# Patient Record
Sex: MALE | Race: WHITE | Employment: OTHER | URBAN - METROPOLITAN AREA
[De-identification: names, ages, dates, MRNs, and addresses within clinical notes are randomized per-mention and may not be internally consistent; named-entity substitution may affect disease eponyms.]

---

## 2017-07-19 ENCOUNTER — HOSPITAL ENCOUNTER (OUTPATIENT)
Dept: OTHER | Age: 82
Discharge: HOME | End: 2017-07-19
Attending: INTERNAL MEDICINE | Admitting: INTERNAL MEDICINE

## 2017-07-19 ENCOUNTER — OFFICE VISIT (OUTPATIENT)
Dept: INTERNAL MEDICINE | Age: 82
End: 2017-07-19

## 2017-07-19 VITALS
BODY MASS INDEX: 23.25 KG/M2 | DIASTOLIC BLOOD PRESSURE: 54 MMHG | SYSTOLIC BLOOD PRESSURE: 122 MMHG | HEIGHT: 75 IN | HEART RATE: 56 BPM | WEIGHT: 187 LBS

## 2017-07-19 DIAGNOSIS — I10 ESSENTIAL HYPERTENSION: ICD-10-CM

## 2017-07-19 DIAGNOSIS — E03.9 HYPOTHYROIDISM, UNSPECIFIED TYPE: ICD-10-CM

## 2017-07-19 DIAGNOSIS — K31.9 DYSPEPSIA AND DISORDER OF FUNCTION OF STOMACH: ICD-10-CM

## 2017-07-19 DIAGNOSIS — R63.4 WEIGHT LOSS: ICD-10-CM

## 2017-07-19 DIAGNOSIS — E78.5 HYPERLIPIDEMIA, UNSPECIFIED HYPERLIPIDEMIA TYPE: ICD-10-CM

## 2017-07-19 DIAGNOSIS — D64.9 ANEMIA, UNSPECIFIED TYPE: ICD-10-CM

## 2017-07-19 DIAGNOSIS — I25.10 CORONARY ARTERY DISEASE INVOLVING NATIVE CORONARY ARTERY OF NATIVE HEART WITHOUT ANGINA PECTORIS: ICD-10-CM

## 2017-07-19 DIAGNOSIS — R10.13 DYSPEPSIA AND DISORDER OF FUNCTION OF STOMACH: ICD-10-CM

## 2017-07-19 DIAGNOSIS — R63.4 WEIGHT LOSS: Primary | ICD-10-CM

## 2017-07-19 LAB
A/G RATIO: 2 (ref 1.1–2.2)
ALBUMIN SERPL-MCNC: 4.3 G/DL (ref 3.4–5)
ALP BLD-CCNC: 157 U/L (ref 40–129)
ALT SERPL-CCNC: 165 U/L (ref 10–40)
ANION GAP SERPL CALCULATED.3IONS-SCNC: 12 MMOL/L (ref 3–16)
AST SERPL-CCNC: 97 U/L (ref 15–37)
BASOPHILS ABSOLUTE: 0 K/UL (ref 0–0.2)
BASOPHILS RELATIVE PERCENT: 0.5 %
BILIRUB SERPL-MCNC: 0.7 MG/DL (ref 0–1)
BUN BLDV-MCNC: 13 MG/DL (ref 7–20)
CALCIUM SERPL-MCNC: 9.2 MG/DL (ref 8.3–10.6)
CHLORIDE BLD-SCNC: 99 MMOL/L (ref 99–110)
CHOLESTEROL, TOTAL: 148 MG/DL (ref 0–199)
CO2: 28 MMOL/L (ref 21–32)
CREAT SERPL-MCNC: 1 MG/DL (ref 0.8–1.3)
EOSINOPHILS ABSOLUTE: 0.1 K/UL (ref 0–0.6)
EOSINOPHILS RELATIVE PERCENT: 1.7 %
GFR AFRICAN AMERICAN: >60
GFR NON-AFRICAN AMERICAN: >60
GLOBULIN: 2.2 G/DL
GLUCOSE BLD-MCNC: 97 MG/DL (ref 70–99)
HCT VFR BLD CALC: 39.9 % (ref 40.5–52.5)
HDLC SERPL-MCNC: 65 MG/DL (ref 40–60)
HEMOGLOBIN: 13.5 G/DL (ref 13.5–17.5)
LDL CHOLESTEROL CALCULATED: 71 MG/DL
LYMPHOCYTES ABSOLUTE: 2.7 K/UL (ref 1–5.1)
LYMPHOCYTES RELATIVE PERCENT: 45.6 %
MCH RBC QN AUTO: 32.8 PG (ref 26–34)
MCHC RBC AUTO-ENTMCNC: 33.8 G/DL (ref 31–36)
MCV RBC AUTO: 96.9 FL (ref 80–100)
MONOCYTES ABSOLUTE: 0.3 K/UL (ref 0–1.3)
MONOCYTES RELATIVE PERCENT: 4.9 %
NEUTROPHILS ABSOLUTE: 2.8 K/UL (ref 1.7–7.7)
NEUTROPHILS RELATIVE PERCENT: 47.3 %
PDW BLD-RTO: 14.2 % (ref 12.4–15.4)
PLATELET # BLD: 138 K/UL (ref 135–450)
PMV BLD AUTO: 10.2 FL (ref 5–10.5)
POTASSIUM SERPL-SCNC: 4.5 MMOL/L (ref 3.5–5.1)
RBC # BLD: 4.11 M/UL (ref 4.2–5.9)
SODIUM BLD-SCNC: 139 MMOL/L (ref 136–145)
T4 FREE: 1.3 NG/DL (ref 0.9–1.8)
TOTAL PROTEIN: 6.5 G/DL (ref 6.4–8.2)
TRIGL SERPL-MCNC: 59 MG/DL (ref 0–150)
TSH SERPL DL<=0.05 MIU/L-ACNC: 7.9 UIU/ML (ref 0.27–4.2)
VLDLC SERPL CALC-MCNC: 12 MG/DL
WBC # BLD: 5.9 K/UL (ref 4–11)

## 2017-07-19 PROCEDURE — 1123F ACP DISCUSS/DSCN MKR DOCD: CPT | Performed by: INTERNAL MEDICINE

## 2017-07-19 PROCEDURE — G8427 DOCREV CUR MEDS BY ELIG CLIN: HCPCS | Performed by: INTERNAL MEDICINE

## 2017-07-19 PROCEDURE — G8598 ASA/ANTIPLAT THER USED: HCPCS | Performed by: INTERNAL MEDICINE

## 2017-07-19 PROCEDURE — 4040F PNEUMOC VAC/ADMIN/RCVD: CPT | Performed by: INTERNAL MEDICINE

## 2017-07-19 PROCEDURE — 1036F TOBACCO NON-USER: CPT | Performed by: INTERNAL MEDICINE

## 2017-07-19 PROCEDURE — G8420 CALC BMI NORM PARAMETERS: HCPCS | Performed by: INTERNAL MEDICINE

## 2017-07-19 PROCEDURE — 99214 OFFICE O/P EST MOD 30 MIN: CPT | Performed by: INTERNAL MEDICINE

## 2017-07-19 RX ORDER — PANTOPRAZOLE SODIUM 40 MG/1
40 TABLET, DELAYED RELEASE ORAL 2 TIMES DAILY
Qty: 60 TABLET | Refills: 3 | Status: ON HOLD | OUTPATIENT
Start: 2017-07-19 | End: 2020-08-28 | Stop reason: CLARIF

## 2017-07-19 ASSESSMENT — ENCOUNTER SYMPTOMS
CONSTIPATION: 0
VOICE CHANGE: 0
ABDOMINAL PAIN: 0
BACK PAIN: 0
TROUBLE SWALLOWING: 0
SHORTNESS OF BREATH: 0
DIARRHEA: 0
NAUSEA: 0

## 2017-07-19 ASSESSMENT — PATIENT HEALTH QUESTIONNAIRE - PHQ9
SUM OF ALL RESPONSES TO PHQ9 QUESTIONS 1 & 2: 0
1. LITTLE INTEREST OR PLEASURE IN DOING THINGS: 0
2. FEELING DOWN, DEPRESSED OR HOPELESS: 0
SUM OF ALL RESPONSES TO PHQ QUESTIONS 1-9: 0

## 2017-07-25 ENCOUNTER — TELEPHONE (OUTPATIENT)
Dept: INTERNAL MEDICINE | Age: 82
End: 2017-07-25

## 2017-07-27 ENCOUNTER — TELEPHONE (OUTPATIENT)
Dept: INTERNAL MEDICINE | Age: 82
End: 2017-07-27

## 2017-07-27 DIAGNOSIS — R63.4 WEIGHT LOSS: Primary | ICD-10-CM

## 2017-07-27 DIAGNOSIS — K22.89 ESOPHAGEAL DILATATION: ICD-10-CM

## 2017-08-04 ENCOUNTER — HOSPITAL ENCOUNTER (OUTPATIENT)
Dept: CT IMAGING | Age: 82
Discharge: HOME | End: 2017-08-04
Attending: INTERNAL MEDICINE | Admitting: INTERNAL MEDICINE

## 2017-08-04 DIAGNOSIS — R63.4 WEIGHT LOSS: ICD-10-CM

## 2017-08-04 DIAGNOSIS — K22.89 ESOPHAGEAL DILATATION: ICD-10-CM

## 2017-08-04 DIAGNOSIS — R63.4 ABNORMAL WEIGHT LOSS: ICD-10-CM

## 2017-08-29 ENCOUNTER — HOSPITAL ENCOUNTER (OUTPATIENT)
Dept: ENDOSCOPY | Age: 82
Discharge: HOME | End: 2017-08-29
Attending: INTERNAL MEDICINE | Admitting: INTERNAL MEDICINE

## 2017-08-29 VITALS
HEIGHT: 72 IN | OXYGEN SATURATION: 100 % | RESPIRATION RATE: 18 BRPM | WEIGHT: 177 LBS | BODY MASS INDEX: 23.98 KG/M2 | TEMPERATURE: 99 F | HEART RATE: 48 BPM | DIASTOLIC BLOOD PRESSURE: 53 MMHG | SYSTOLIC BLOOD PRESSURE: 150 MMHG

## 2018-05-30 ENCOUNTER — OFFICE VISIT (OUTPATIENT)
Dept: INTERNAL MEDICINE | Age: 83
End: 2018-05-30

## 2018-05-30 VITALS
DIASTOLIC BLOOD PRESSURE: 62 MMHG | HEIGHT: 75 IN | HEART RATE: 89 BPM | OXYGEN SATURATION: 98 % | RESPIRATION RATE: 16 BRPM | TEMPERATURE: 98.8 F | SYSTOLIC BLOOD PRESSURE: 120 MMHG | WEIGHT: 198.4 LBS | BODY MASS INDEX: 24.67 KG/M2

## 2018-05-30 DIAGNOSIS — R09.89 RUNNY NOSE: ICD-10-CM

## 2018-05-30 DIAGNOSIS — I10 ESSENTIAL HYPERTENSION: ICD-10-CM

## 2018-05-30 DIAGNOSIS — R21 RASH: Primary | ICD-10-CM

## 2018-05-30 PROCEDURE — 4040F PNEUMOC VAC/ADMIN/RCVD: CPT | Performed by: INTERNAL MEDICINE

## 2018-05-30 PROCEDURE — G8420 CALC BMI NORM PARAMETERS: HCPCS | Performed by: INTERNAL MEDICINE

## 2018-05-30 PROCEDURE — G8598 ASA/ANTIPLAT THER USED: HCPCS | Performed by: INTERNAL MEDICINE

## 2018-05-30 PROCEDURE — 99213 OFFICE O/P EST LOW 20 MIN: CPT | Performed by: INTERNAL MEDICINE

## 2018-05-30 PROCEDURE — 1123F ACP DISCUSS/DSCN MKR DOCD: CPT | Performed by: INTERNAL MEDICINE

## 2018-05-30 PROCEDURE — G8427 DOCREV CUR MEDS BY ELIG CLIN: HCPCS | Performed by: INTERNAL MEDICINE

## 2018-05-30 PROCEDURE — 1036F TOBACCO NON-USER: CPT | Performed by: INTERNAL MEDICINE

## 2018-05-30 RX ORDER — TORSEMIDE 20 MG/1
10 TABLET ORAL DAILY
Qty: 30 TABLET | Refills: 3 | Status: ON HOLD
Start: 2018-05-30 | End: 2020-08-28 | Stop reason: CLARIF

## 2018-05-30 RX ORDER — METHYLPREDNISOLONE 4 MG/1
TABLET ORAL
Qty: 1 KIT | Refills: 0 | Status: SHIPPED | OUTPATIENT
Start: 2018-05-30 | End: 2018-06-05

## 2018-05-30 RX ORDER — AZELASTINE 1 MG/ML
1 SPRAY, METERED NASAL 2 TIMES DAILY
Qty: 1 BOTTLE | Refills: 3 | Status: SHIPPED
Start: 2018-05-30 | End: 2020-08-20 | Stop reason: CLARIF

## 2018-05-30 ASSESSMENT — ENCOUNTER SYMPTOMS
EYE REDNESS: 0
ABDOMINAL PAIN: 0
SHORTNESS OF BREATH: 0
CHEST TIGHTNESS: 0
BACK PAIN: 0
NAUSEA: 0

## 2018-07-10 ENCOUNTER — OFFICE VISIT (OUTPATIENT)
Dept: INTERNAL MEDICINE | Age: 83
End: 2018-07-10

## 2018-07-10 VITALS
OXYGEN SATURATION: 96 % | WEIGHT: 199 LBS | DIASTOLIC BLOOD PRESSURE: 58 MMHG | SYSTOLIC BLOOD PRESSURE: 132 MMHG | HEART RATE: 85 BPM | HEIGHT: 75 IN | BODY MASS INDEX: 24.74 KG/M2

## 2018-07-10 DIAGNOSIS — R21 RASH AND NONSPECIFIC SKIN ERUPTION: ICD-10-CM

## 2018-07-10 DIAGNOSIS — R21 RASH: Primary | ICD-10-CM

## 2018-07-10 DIAGNOSIS — I77.9 BILATERAL CAROTID ARTERY DISEASE (HCC): ICD-10-CM

## 2018-07-10 PROBLEM — R63.4 WEIGHT LOSS: Status: RESOLVED | Noted: 2017-07-19 | Resolved: 2018-07-10

## 2018-07-10 PROCEDURE — 99213 OFFICE O/P EST LOW 20 MIN: CPT | Performed by: INTERNAL MEDICINE

## 2018-07-10 PROCEDURE — G8427 DOCREV CUR MEDS BY ELIG CLIN: HCPCS | Performed by: INTERNAL MEDICINE

## 2018-07-10 PROCEDURE — 1036F TOBACCO NON-USER: CPT | Performed by: INTERNAL MEDICINE

## 2018-07-10 PROCEDURE — 96372 THER/PROPH/DIAG INJ SC/IM: CPT | Performed by: INTERNAL MEDICINE

## 2018-07-10 PROCEDURE — 1123F ACP DISCUSS/DSCN MKR DOCD: CPT | Performed by: INTERNAL MEDICINE

## 2018-07-10 PROCEDURE — G8598 ASA/ANTIPLAT THER USED: HCPCS | Performed by: INTERNAL MEDICINE

## 2018-07-10 PROCEDURE — 4040F PNEUMOC VAC/ADMIN/RCVD: CPT | Performed by: INTERNAL MEDICINE

## 2018-07-10 PROCEDURE — G8420 CALC BMI NORM PARAMETERS: HCPCS | Performed by: INTERNAL MEDICINE

## 2018-07-10 RX ORDER — METHYLPREDNISOLONE ACETATE 40 MG/ML
40 INJECTION, SUSPENSION INTRA-ARTICULAR; INTRALESIONAL; INTRAMUSCULAR; SOFT TISSUE ONCE
Status: COMPLETED | OUTPATIENT
Start: 2018-07-10 | End: 2018-07-10

## 2018-07-10 RX ORDER — IVERMECTIN 3 MG/1
TABLET ORAL
Qty: 8 TABLET | Refills: 0 | Status: SHIPPED
Start: 2018-07-10 | End: 2020-08-20 | Stop reason: CLARIF

## 2018-07-10 RX ORDER — IVERMECTIN 3 MG/1
TABLET ORAL
Qty: 8 TABLET | Refills: 0 | Status: SHIPPED | OUTPATIENT
Start: 2018-07-10 | End: 2018-07-10 | Stop reason: SDUPTHER

## 2018-07-10 RX ORDER — METHYLPREDNISOLONE ACETATE 80 MG/ML
80 INJECTION, SUSPENSION INTRA-ARTICULAR; INTRALESIONAL; INTRAMUSCULAR; SOFT TISSUE ONCE
Status: DISCONTINUED | OUTPATIENT
Start: 2018-07-10 | End: 2020-08-20 | Stop reason: CLARIF

## 2018-07-10 RX ADMIN — METHYLPREDNISOLONE ACETATE 40 MG: 40 INJECTION, SUSPENSION INTRA-ARTICULAR; INTRALESIONAL; INTRAMUSCULAR; SOFT TISSUE at 10:57

## 2018-07-10 ASSESSMENT — ENCOUNTER SYMPTOMS
TROUBLE SWALLOWING: 0
GASTROINTESTINAL NEGATIVE: 1
EYE DISCHARGE: 0
RESPIRATORY NEGATIVE: 1

## 2018-07-10 NOTE — PROGRESS NOTES
skin eruption        Rash and nonspecific skin eruption  Patient's rash initially disappeared after patient was changed from Lasix to Demadex. He was also treated concomitantly with steroids. Approximately 3 weeks later his rash has returned. The itching gets more intense at night. We'll empirically treat with ivermectin. Also give Depo-Medrol 40 mg today. If no better then consider dermatological evaluation. PLAN:  See ASSESSMENT for evaluation & PLAN     No orders of the defined types were placed in this encounter. PSH, PMH, SH and FH reviewed and noted. Recent and past labs, tests and consults also reviewed. Recent or new meds also reviewed.

## 2018-07-10 NOTE — ASSESSMENT & PLAN NOTE
Patient's rash initially disappeared after patient was changed from Lasix to Demadex. He was also treated concomitantly with steroids. Approximately 3 weeks later his rash has returned. The itching gets more intense at night. We'll empirically treat with ivermectin. Also give Depo-Medrol 40 mg today. If no better then consider dermatological evaluation.

## 2018-10-16 ENCOUNTER — NURSE ONLY (OUTPATIENT)
Dept: INTERNAL MEDICINE CLINIC | Age: 83
End: 2018-10-16
Payer: MEDICARE

## 2018-10-16 DIAGNOSIS — Z23 NEED FOR INFLUENZA VACCINATION: Primary | ICD-10-CM

## 2018-10-16 PROCEDURE — G0008 ADMIN INFLUENZA VIRUS VAC: HCPCS | Performed by: INTERNAL MEDICINE

## 2018-10-16 PROCEDURE — 90662 IIV NO PRSV INCREASED AG IM: CPT | Performed by: INTERNAL MEDICINE

## 2018-10-16 NOTE — PROGRESS NOTES
Vaccine Information Sheet, \"Influenza - Inactivated\"  given to Kaykay Chowdhury, or parent/legal guardian of  Kaykay Chowdhury and verbalized understanding. Patient responses:    Have you ever had a reaction to a flu vaccine? No  Are you able to eat eggs without adverse effects? Yes  Do you have any current illness? No  Have you ever had Guillian Creston Syndrome? No    Flu vaccine given per order. Please see immunization tab.

## 2019-06-11 ENCOUNTER — TELEPHONE (OUTPATIENT)
Dept: INTERNAL MEDICINE CLINIC | Age: 84
End: 2019-06-11

## 2019-10-22 ENCOUNTER — NURSE ONLY (OUTPATIENT)
Dept: INTERNAL MEDICINE CLINIC | Age: 84
End: 2019-10-22
Payer: MEDICARE

## 2019-10-22 DIAGNOSIS — Z23 NEED FOR INFLUENZA VACCINATION: Primary | ICD-10-CM

## 2019-10-22 PROCEDURE — 90653 IIV ADJUVANT VACCINE IM: CPT | Performed by: INTERNAL MEDICINE

## 2019-10-22 PROCEDURE — G0008 ADMIN INFLUENZA VIRUS VAC: HCPCS | Performed by: INTERNAL MEDICINE

## 2019-10-22 ASSESSMENT — PATIENT HEALTH QUESTIONNAIRE - PHQ9
1. LITTLE INTEREST OR PLEASURE IN DOING THINGS: 0
SUM OF ALL RESPONSES TO PHQ QUESTIONS 1-9: 0
SUM OF ALL RESPONSES TO PHQ9 QUESTIONS 1 & 2: 0
2. FEELING DOWN, DEPRESSED OR HOPELESS: 0
SUM OF ALL RESPONSES TO PHQ QUESTIONS 1-9: 0

## 2020-08-20 ENCOUNTER — HOSPITAL ENCOUNTER (INPATIENT)
Age: 85
LOS: 4 days | Discharge: NURSING, REHAB OR LONG-TERM CARE | DRG: 481 | End: 2020-08-24
Attending: EMERGENCY MEDICINE | Admitting: HOSPITALIST
Payer: MEDICARE

## 2020-08-20 ENCOUNTER — APPOINTMENT (OUTPATIENT)
Dept: CT IMAGING | Age: 85
DRG: 481 | End: 2020-08-20
Payer: MEDICARE

## 2020-08-20 ENCOUNTER — APPOINTMENT (OUTPATIENT)
Dept: GENERAL RADIOLOGY | Age: 85
DRG: 481 | End: 2020-08-20
Payer: MEDICARE

## 2020-08-20 PROBLEM — S72.002A CLOSED LEFT HIP FRACTURE, INITIAL ENCOUNTER (HCC): Status: ACTIVE | Noted: 2020-08-20

## 2020-08-20 LAB
ANION GAP SERPL CALCULATED.3IONS-SCNC: 9 MMOL/L (ref 3–16)
ATYPICAL LYMPHOCYTE RELATIVE PERCENT: 2 % (ref 0–6)
BACTERIA: ABNORMAL /HPF
BASOPHILS ABSOLUTE: 0 K/UL (ref 0–0.2)
BASOPHILS RELATIVE PERCENT: 0 %
BILIRUBIN URINE: NEGATIVE
BLOOD, URINE: ABNORMAL
BUN BLDV-MCNC: 13 MG/DL (ref 7–20)
CALCIUM SERPL-MCNC: 8.9 MG/DL (ref 8.3–10.6)
CHLORIDE BLD-SCNC: 101 MMOL/L (ref 99–110)
CLARITY: CLEAR
CO2: 24 MMOL/L (ref 21–32)
COLOR: YELLOW
CREAT SERPL-MCNC: 1.1 MG/DL (ref 0.8–1.3)
EKG ATRIAL RATE: 60 BPM
EKG DIAGNOSIS: NORMAL
EKG P AXIS: 34 DEGREES
EKG P-R INTERVAL: 234 MS
EKG Q-T INTERVAL: 438 MS
EKG QRS DURATION: 128 MS
EKG QTC CALCULATION (BAZETT): 438 MS
EKG R AXIS: -51 DEGREES
EKG T AXIS: 86 DEGREES
EKG VENTRICULAR RATE: 60 BPM
EOSINOPHILS ABSOLUTE: 0.1 K/UL (ref 0–0.6)
EOSINOPHILS RELATIVE PERCENT: 1 %
EPITHELIAL CELLS, UA: ABNORMAL /HPF (ref 0–5)
GFR AFRICAN AMERICAN: >60
GFR NON-AFRICAN AMERICAN: >60
GLUCOSE BLD-MCNC: 111 MG/DL (ref 70–99)
GLUCOSE URINE: NEGATIVE MG/DL
HCT VFR BLD CALC: 39 % (ref 40.5–52.5)
HEMOGLOBIN: 12.8 G/DL (ref 13.5–17.5)
KETONES, URINE: NEGATIVE MG/DL
LEUKOCYTE ESTERASE, URINE: ABNORMAL
LYMPHOCYTES ABSOLUTE: 4.1 K/UL (ref 1–5.1)
LYMPHOCYTES RELATIVE PERCENT: 31 %
MCH RBC QN AUTO: 32.3 PG (ref 26–34)
MCHC RBC AUTO-ENTMCNC: 33 G/DL (ref 31–36)
MCV RBC AUTO: 98 FL (ref 80–100)
MICROSCOPIC EXAMINATION: YES
MONOCYTES ABSOLUTE: 0.2 K/UL (ref 0–1.3)
MONOCYTES RELATIVE PERCENT: 2 %
NEUTROPHILS ABSOLUTE: 7.9 K/UL (ref 1.7–7.7)
NEUTROPHILS RELATIVE PERCENT: 64 %
NITRITE, URINE: NEGATIVE
PDW BLD-RTO: 14 % (ref 12.4–15.4)
PH UA: 7.5 (ref 5–8)
PLATELET # BLD: 116 K/UL (ref 135–450)
PLATELET SLIDE REVIEW: ABNORMAL
PMV BLD AUTO: 9.3 FL (ref 5–10.5)
POTASSIUM REFLEX MAGNESIUM: 4.8 MMOL/L (ref 3.5–5.1)
PRO-BNP: 1925 PG/ML (ref 0–449)
PROTEIN UA: ABNORMAL MG/DL
RBC # BLD: 3.98 M/UL (ref 4.2–5.9)
RBC UA: ABNORMAL /HPF (ref 0–4)
SODIUM BLD-SCNC: 134 MMOL/L (ref 136–145)
SPECIFIC GRAVITY UA: 1.01 (ref 1–1.03)
TROPONIN: 0.02 NG/ML
URINE REFLEX TO CULTURE: YES
URINE TYPE: ABNORMAL
UROBILINOGEN, URINE: 0.2 E.U./DL
WBC # BLD: 12.4 K/UL (ref 4–11)
WBC UA: ABNORMAL /HPF (ref 0–5)

## 2020-08-20 PROCEDURE — 1200000000 HC SEMI PRIVATE

## 2020-08-20 PROCEDURE — 85025 COMPLETE CBC W/AUTO DIFF WBC: CPT

## 2020-08-20 PROCEDURE — 84484 ASSAY OF TROPONIN QUANT: CPT

## 2020-08-20 PROCEDURE — 80048 BASIC METABOLIC PNL TOTAL CA: CPT

## 2020-08-20 PROCEDURE — 72125 CT NECK SPINE W/O DYE: CPT

## 2020-08-20 PROCEDURE — 93005 ELECTROCARDIOGRAM TRACING: CPT | Performed by: STUDENT IN AN ORGANIZED HEALTH CARE EDUCATION/TRAINING PROGRAM

## 2020-08-20 PROCEDURE — 6370000000 HC RX 637 (ALT 250 FOR IP): Performed by: STUDENT IN AN ORGANIZED HEALTH CARE EDUCATION/TRAINING PROGRAM

## 2020-08-20 PROCEDURE — 70450 CT HEAD/BRAIN W/O DYE: CPT

## 2020-08-20 PROCEDURE — 72170 X-RAY EXAM OF PELVIS: CPT

## 2020-08-20 PROCEDURE — 2580000003 HC RX 258: Performed by: STUDENT IN AN ORGANIZED HEALTH CARE EDUCATION/TRAINING PROGRAM

## 2020-08-20 PROCEDURE — 6360000002 HC RX W HCPCS: Performed by: STUDENT IN AN ORGANIZED HEALTH CARE EDUCATION/TRAINING PROGRAM

## 2020-08-20 PROCEDURE — 99285 EMERGENCY DEPT VISIT HI MDM: CPT

## 2020-08-20 PROCEDURE — 71045 X-RAY EXAM CHEST 1 VIEW: CPT

## 2020-08-20 PROCEDURE — 73552 X-RAY EXAM OF FEMUR 2/>: CPT

## 2020-08-20 PROCEDURE — 83880 ASSAY OF NATRIURETIC PEPTIDE: CPT

## 2020-08-20 PROCEDURE — 6360000002 HC RX W HCPCS: Performed by: HOSPITALIST

## 2020-08-20 PROCEDURE — 73700 CT LOWER EXTREMITY W/O DYE: CPT

## 2020-08-20 PROCEDURE — 96365 THER/PROPH/DIAG IV INF INIT: CPT

## 2020-08-20 PROCEDURE — 87086 URINE CULTURE/COLONY COUNT: CPT

## 2020-08-20 PROCEDURE — 81001 URINALYSIS AUTO W/SCOPE: CPT

## 2020-08-20 RX ORDER — PROMETHAZINE HYDROCHLORIDE 12.5 MG/1
12.5 TABLET ORAL EVERY 6 HOURS PRN
Status: DISCONTINUED | OUTPATIENT
Start: 2020-08-20 | End: 2020-08-24 | Stop reason: HOSPADM

## 2020-08-20 RX ORDER — ACETAMINOPHEN 650 MG/1
650 SUPPOSITORY RECTAL EVERY 6 HOURS PRN
Status: DISCONTINUED | OUTPATIENT
Start: 2020-08-20 | End: 2020-08-22

## 2020-08-20 RX ORDER — TAMSULOSIN HYDROCHLORIDE 0.4 MG/1
0.4 CAPSULE ORAL NIGHTLY
Status: ON HOLD | COMMUNITY
End: 2020-08-24 | Stop reason: SDUPTHER

## 2020-08-20 RX ORDER — TAMSULOSIN HYDROCHLORIDE 0.4 MG/1
0.4 CAPSULE ORAL NIGHTLY
Status: DISCONTINUED | OUTPATIENT
Start: 2020-08-20 | End: 2020-08-24 | Stop reason: HOSPADM

## 2020-08-20 RX ORDER — ONDANSETRON 2 MG/ML
4 INJECTION INTRAMUSCULAR; INTRAVENOUS EVERY 6 HOURS PRN
Status: DISCONTINUED | OUTPATIENT
Start: 2020-08-20 | End: 2020-08-24 | Stop reason: HOSPADM

## 2020-08-20 RX ORDER — ACETAMINOPHEN 325 MG/1
650 TABLET ORAL EVERY 6 HOURS PRN
Status: DISCONTINUED | OUTPATIENT
Start: 2020-08-20 | End: 2020-08-22

## 2020-08-20 RX ORDER — POLYETHYLENE GLYCOL 3350 17 G/17G
17 POWDER, FOR SOLUTION ORAL DAILY PRN
Status: DISCONTINUED | OUTPATIENT
Start: 2020-08-20 | End: 2020-08-24 | Stop reason: HOSPADM

## 2020-08-20 RX ORDER — FUROSEMIDE 10 MG/ML
20 INJECTION INTRAMUSCULAR; INTRAVENOUS 2 TIMES DAILY
Status: DISCONTINUED | OUTPATIENT
Start: 2020-08-20 | End: 2020-08-24 | Stop reason: HOSPADM

## 2020-08-20 RX ORDER — SODIUM CHLORIDE 0.9 % (FLUSH) 0.9 %
10 SYRINGE (ML) INJECTION PRN
Status: DISCONTINUED | OUTPATIENT
Start: 2020-08-20 | End: 2020-08-24 | Stop reason: HOSPADM

## 2020-08-20 RX ORDER — AZITHROMYCIN 250 MG/1
500 TABLET, FILM COATED ORAL ONCE
Status: COMPLETED | OUTPATIENT
Start: 2020-08-20 | End: 2020-08-20

## 2020-08-20 RX ORDER — LISINOPRIL 20 MG/1
20 TABLET ORAL DAILY
Status: DISCONTINUED | OUTPATIENT
Start: 2020-08-21 | End: 2020-08-24 | Stop reason: HOSPADM

## 2020-08-20 RX ORDER — ASPIRIN 81 MG/1
81 TABLET ORAL DAILY
Status: DISCONTINUED | OUTPATIENT
Start: 2020-08-21 | End: 2020-08-24 | Stop reason: HOSPADM

## 2020-08-20 RX ORDER — ATORVASTATIN CALCIUM 80 MG/1
80 TABLET, FILM COATED ORAL DAILY
Status: DISCONTINUED | OUTPATIENT
Start: 2020-08-21 | End: 2020-08-24 | Stop reason: HOSPADM

## 2020-08-20 RX ORDER — SODIUM CHLORIDE 0.9 % (FLUSH) 0.9 %
10 SYRINGE (ML) INJECTION EVERY 12 HOURS SCHEDULED
Status: DISCONTINUED | OUTPATIENT
Start: 2020-08-20 | End: 2020-08-24 | Stop reason: HOSPADM

## 2020-08-20 RX ORDER — MORPHINE SULFATE 2 MG/ML
2 INJECTION, SOLUTION INTRAMUSCULAR; INTRAVENOUS EVERY 4 HOURS PRN
Status: DISPENSED | OUTPATIENT
Start: 2020-08-20 | End: 2020-08-22

## 2020-08-20 RX ORDER — LEVOTHYROXINE SODIUM 0.05 MG/1
50 TABLET ORAL DAILY
COMMUNITY
End: 2020-10-16 | Stop reason: SDUPTHER

## 2020-08-20 RX ORDER — LEVOTHYROXINE SODIUM 0.05 MG/1
50 TABLET ORAL
Status: DISCONTINUED | OUTPATIENT
Start: 2020-08-21 | End: 2020-08-24 | Stop reason: HOSPADM

## 2020-08-20 RX ADMIN — AZITHROMYCIN MONOHYDRATE 500 MG: 250 TABLET ORAL at 15:50

## 2020-08-20 RX ADMIN — Medication 10 ML: at 21:51

## 2020-08-20 RX ADMIN — CEFTRIAXONE 1 G: 1 INJECTION, POWDER, FOR SOLUTION INTRAMUSCULAR; INTRAVENOUS at 15:49

## 2020-08-20 RX ADMIN — FUROSEMIDE 20 MG: 10 INJECTION, SOLUTION INTRAMUSCULAR; INTRAVENOUS at 21:49

## 2020-08-20 RX ADMIN — TAMSULOSIN HYDROCHLORIDE 0.4 MG: 0.4 CAPSULE ORAL at 23:40

## 2020-08-20 RX ADMIN — MORPHINE SULFATE 2 MG: 2 INJECTION, SOLUTION INTRAMUSCULAR; INTRAVENOUS at 21:49

## 2020-08-20 ASSESSMENT — PAIN DESCRIPTION - LOCATION
LOCATION: HIP
LOCATION: BACK

## 2020-08-20 ASSESSMENT — PAIN DESCRIPTION - FREQUENCY
FREQUENCY: CONTINUOUS
FREQUENCY: CONTINUOUS

## 2020-08-20 ASSESSMENT — PAIN DESCRIPTION - PAIN TYPE: TYPE: ACUTE PAIN

## 2020-08-20 ASSESSMENT — ENCOUNTER SYMPTOMS
RHINORRHEA: 0
TROUBLE SWALLOWING: 0
ABDOMINAL DISTENTION: 0
CHEST TIGHTNESS: 0
BACK PAIN: 0
ABDOMINAL PAIN: 0
COUGH: 0
EYES NEGATIVE: 1
NAUSEA: 0
BACK PAIN: 1
VOMITING: 0
SHORTNESS OF BREATH: 0
GASTROINTESTINAL NEGATIVE: 1
RESPIRATORY NEGATIVE: 1
DIARRHEA: 0
WHEEZING: 0

## 2020-08-20 ASSESSMENT — PAIN DESCRIPTION - DESCRIPTORS
DESCRIPTORS: PRESSURE
DESCRIPTORS: ACHING

## 2020-08-20 ASSESSMENT — PAIN SCALES - GENERAL
PAINLEVEL_OUTOF10: 7
PAINLEVEL_OUTOF10: 7
PAINLEVEL_OUTOF10: 8
PAINLEVEL_OUTOF10: 4

## 2020-08-20 ASSESSMENT — PAIN DESCRIPTION - ONSET
ONSET: ON-GOING
ONSET: ON-GOING

## 2020-08-20 ASSESSMENT — PAIN DESCRIPTION - ORIENTATION
ORIENTATION: MID;LOWER
ORIENTATION: LEFT

## 2020-08-20 ASSESSMENT — PAIN - FUNCTIONAL ASSESSMENT: PAIN_FUNCTIONAL_ASSESSMENT: PREVENTS OR INTERFERES SOME ACTIVE ACTIVITIES AND ADLS

## 2020-08-20 ASSESSMENT — PAIN DESCRIPTION - PROGRESSION
CLINICAL_PROGRESSION: NOT CHANGED
CLINICAL_PROGRESSION: NOT CHANGED
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
## 2020-08-20 NOTE — ED NOTES
Bed: B24-24  Expected date:   Expected time:   Means of arrival:   Comments:  Hold for medic     Salvador Lynch  08/20/20 [id number]

## 2020-08-21 ENCOUNTER — TELEPHONE (OUTPATIENT)
Dept: INTERNAL MEDICINE CLINIC | Age: 85
End: 2020-08-21

## 2020-08-21 ENCOUNTER — ANESTHESIA EVENT (OUTPATIENT)
Dept: OPERATING ROOM | Age: 85
DRG: 481 | End: 2020-08-21
Payer: MEDICARE

## 2020-08-21 LAB
ALBUMIN SERPL-MCNC: 3.7 G/DL (ref 3.4–5)
ANION GAP SERPL CALCULATED.3IONS-SCNC: 11 MMOL/L (ref 3–16)
BASOPHILS ABSOLUTE: 0 K/UL (ref 0–0.2)
BASOPHILS RELATIVE PERCENT: 0.4 %
BUN BLDV-MCNC: 16 MG/DL (ref 7–20)
CALCIUM SERPL-MCNC: 8.6 MG/DL (ref 8.3–10.6)
CHLORIDE BLD-SCNC: 100 MMOL/L (ref 99–110)
CO2: 24 MMOL/L (ref 21–32)
CREAT SERPL-MCNC: 1.2 MG/DL (ref 0.8–1.3)
EOSINOPHILS ABSOLUTE: 0.2 K/UL (ref 0–0.6)
EOSINOPHILS RELATIVE PERCENT: 2.4 %
GFR AFRICAN AMERICAN: >60
GFR NON-AFRICAN AMERICAN: 56
GLUCOSE BLD-MCNC: 112 MG/DL (ref 70–99)
HCT VFR BLD CALC: 33.6 % (ref 40.5–52.5)
HEMOGLOBIN: 11.6 G/DL (ref 13.5–17.5)
LYMPHOCYTES ABSOLUTE: 3.9 K/UL (ref 1–5.1)
LYMPHOCYTES RELATIVE PERCENT: 38.1 %
MCH RBC QN AUTO: 32.9 PG (ref 26–34)
MCHC RBC AUTO-ENTMCNC: 34.5 G/DL (ref 31–36)
MCV RBC AUTO: 95.3 FL (ref 80–100)
MONOCYTES ABSOLUTE: 0.3 K/UL (ref 0–1.3)
MONOCYTES RELATIVE PERCENT: 2.7 %
NEUTROPHILS ABSOLUTE: 5.8 K/UL (ref 1.7–7.7)
NEUTROPHILS RELATIVE PERCENT: 56.4 %
PDW BLD-RTO: 13.7 % (ref 12.4–15.4)
PHOSPHORUS: 3.6 MG/DL (ref 2.5–4.9)
PLATELET # BLD: 109 K/UL (ref 135–450)
PMV BLD AUTO: 9.4 FL (ref 5–10.5)
POTASSIUM SERPL-SCNC: 4.4 MMOL/L (ref 3.5–5.1)
RBC # BLD: 3.53 M/UL (ref 4.2–5.9)
SODIUM BLD-SCNC: 135 MMOL/L (ref 136–145)
URINE CULTURE, ROUTINE: NORMAL
WBC # BLD: 10.2 K/UL (ref 4–11)

## 2020-08-21 PROCEDURE — 99223 1ST HOSP IP/OBS HIGH 75: CPT | Performed by: INTERNAL MEDICINE

## 2020-08-21 PROCEDURE — U0003 INFECTIOUS AGENT DETECTION BY NUCLEIC ACID (DNA OR RNA); SEVERE ACUTE RESPIRATORY SYNDROME CORONAVIRUS 2 (SARS-COV-2) (CORONAVIRUS DISEASE [COVID-19]), AMPLIFIED PROBE TECHNIQUE, MAKING USE OF HIGH THROUGHPUT TECHNOLOGIES AS DESCRIBED BY CMS-2020-01-R: HCPCS

## 2020-08-21 PROCEDURE — 36415 COLL VENOUS BLD VENIPUNCTURE: CPT

## 2020-08-21 PROCEDURE — U0002 COVID-19 LAB TEST NON-CDC: HCPCS

## 2020-08-21 PROCEDURE — 1200000000 HC SEMI PRIVATE

## 2020-08-21 PROCEDURE — C8929 TTE W OR WO FOL WCON,DOPPLER: HCPCS

## 2020-08-21 PROCEDURE — 2580000003 HC RX 258: Performed by: STUDENT IN AN ORGANIZED HEALTH CARE EDUCATION/TRAINING PROGRAM

## 2020-08-21 PROCEDURE — 85025 COMPLETE CBC W/AUTO DIFF WBC: CPT

## 2020-08-21 PROCEDURE — 99222 1ST HOSP IP/OBS MODERATE 55: CPT | Performed by: HOSPITALIST

## 2020-08-21 PROCEDURE — 99221 1ST HOSP IP/OBS SF/LOW 40: CPT | Performed by: ORTHOPAEDIC SURGERY

## 2020-08-21 PROCEDURE — 6360000002 HC RX W HCPCS: Performed by: HOSPITALIST

## 2020-08-21 PROCEDURE — 94761 N-INVAS EAR/PLS OXIMETRY MLT: CPT

## 2020-08-21 PROCEDURE — 6360000002 HC RX W HCPCS: Performed by: STUDENT IN AN ORGANIZED HEALTH CARE EDUCATION/TRAINING PROGRAM

## 2020-08-21 PROCEDURE — 6360000004 HC RX CONTRAST MEDICATION: Performed by: INTERNAL MEDICINE

## 2020-08-21 PROCEDURE — 80069 RENAL FUNCTION PANEL: CPT

## 2020-08-21 PROCEDURE — 6370000000 HC RX 637 (ALT 250 FOR IP): Performed by: STUDENT IN AN ORGANIZED HEALTH CARE EDUCATION/TRAINING PROGRAM

## 2020-08-21 RX ADMIN — ATORVASTATIN CALCIUM 80 MG: 80 TABLET, FILM COATED ORAL at 09:05

## 2020-08-21 RX ADMIN — LEVOTHYROXINE SODIUM 50 MCG: 50 TABLET ORAL at 06:16

## 2020-08-21 RX ADMIN — MORPHINE SULFATE 2 MG: 2 INJECTION, SOLUTION INTRAMUSCULAR; INTRAVENOUS at 17:46

## 2020-08-21 RX ADMIN — MORPHINE SULFATE 2 MG: 2 INJECTION, SOLUTION INTRAMUSCULAR; INTRAVENOUS at 10:34

## 2020-08-21 RX ADMIN — ASPIRIN 81 MG: 81 TABLET, COATED ORAL at 09:05

## 2020-08-21 RX ADMIN — ONDANSETRON 4 MG: 2 INJECTION INTRAMUSCULAR; INTRAVENOUS at 12:24

## 2020-08-21 RX ADMIN — LISINOPRIL 20 MG: 20 TABLET ORAL at 09:05

## 2020-08-21 RX ADMIN — FUROSEMIDE 20 MG: 10 INJECTION, SOLUTION INTRAMUSCULAR; INTRAVENOUS at 09:05

## 2020-08-21 RX ADMIN — TAMSULOSIN HYDROCHLORIDE 0.4 MG: 0.4 CAPSULE ORAL at 20:13

## 2020-08-21 RX ADMIN — Medication 10 ML: at 21:27

## 2020-08-21 RX ADMIN — MORPHINE SULFATE 2 MG: 2 INJECTION, SOLUTION INTRAMUSCULAR; INTRAVENOUS at 02:41

## 2020-08-21 RX ADMIN — PERFLUTREN 1.65 MG: 6.52 INJECTION, SUSPENSION INTRAVENOUS at 11:36

## 2020-08-21 RX ADMIN — FUROSEMIDE 20 MG: 10 INJECTION, SOLUTION INTRAMUSCULAR; INTRAVENOUS at 17:48

## 2020-08-21 RX ADMIN — ENOXAPARIN SODIUM 40 MG: 40 INJECTION SUBCUTANEOUS at 09:05

## 2020-08-21 ASSESSMENT — PAIN SCALES - GENERAL
PAINLEVEL_OUTOF10: 8
PAINLEVEL_OUTOF10: 9
PAINLEVEL_OUTOF10: 0
PAINLEVEL_OUTOF10: 5
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
## 2020-08-21 NOTE — PROGRESS NOTES
tenderness/mass/nodules  ? Lungs: B/l rales  ? Heart: no murmur, click, rub or gallop  ? Abdomen: soft, non-tender; bowel sounds normal.  ? Extremities: b/l lower leg edema  ? Neurologic: Mental status: Alert, oriented, thought content appropriate. DATA:       Labs:  CBC:   Recent Labs     08/20/20  1412 08/21/20  0538   WBC 12.4* 10.2   HGB 12.8* 11.6*   HCT 39.0* 33.6*   * 109*       BMP:   Recent Labs     08/20/20  1412 08/21/20  0538   * 135*   K 4.8 4.4    100   CO2 24 24   BUN 13 16   CREATININE 1.1 1.2   GLUCOSE 111* 112*     LFT's: No results for input(s): AST, ALT, ALB, BILITOT, ALKPHOS in the last 72 hours. Troponin:   Recent Labs     08/20/20  1412   TROPONINI 0.02*     BNP: No results for input(s): BNP in the last 72 hours. ABGs: No results for input(s): PHART, FHD8WQX, PO2ART in the last 72 hours. INR: No results for input(s): INR in the last 72 hours. Lipids: No results for input(s): CHOL, HDL in the last 72 hours. Invalid input(s): LDLCALCU    U/A:  Recent Labs     08/20/20  1452   COLORU Yellow   PHUR 7.5   WBCUA 10-20*   RBCUA 0-2   BACTERIA 1+*   CLARITYU Clear   SPECGRAV 1.010   LEUKOCYTESUR SMALL*   UROBILINOGEN 0.2   BILIRUBINUR Negative   BLOODU MODERATE*   GLUCOSEU Negative       Rad:  CT HIP LEFT WO CONTRAST   Final Result   Nondisplaced impacted subcapital left femoral neck fracture. XR FEMUR LEFT (MIN 2 VIEWS)   Final Result      1. No acute osseous abnormality. 2. Mild left hip arthritis. 3. Moderate vascular calcifications. XR PELVIS (1-2 VIEWS)   Final Result      Limited evaluation the pelvis as described. There is possible mild impaction fracture left femoral neck. Dedicated images recommended. XR CHEST PORTABLE   Final Result      Left lower lung opacity obscuring the left hemidiaphragm may relate to atelectasis and/or consolidation. CT CERVICAL SPINE WO CONTRAST   Final Result   1.  No evidence of an acute fracture or dislocation in the cervical spine. 2. Severe spondylosis contributing to multiple levels of severe foraminal narrowing and mild to moderate central canal stenosis. 3. 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 4. Profoundly dilated esophagus fluid and debris within the lumen compatible with the patient's history of achalasia. CT HEAD WO CONTRAST   Final Result      No acute hemorrhage          ASSESSMENT AND PLAN:   Bill Bernard is a 80 y.o. male, who was admitted after a fall at home. Hip Fracture 2/2 questionable mechanical fall  Imaging as above  - Orthopedics consulted for fixation  - Cardiology consulted. Appreciate recommendation.  - Hold plavix  - Cardiac diet, NPO at midnight  - Waiting for Echo before surgery     Fall - no LOC, unclear etiology most likely mechanical. No prodrome concerning for possible cardiac etiology.   - Orthostatics deferred due to patient safety with fracture  - Cardiology consulted recs appreciated  - PT/OT when cleared by ortho  - telemetry     HFpEF - no EF on file, but appeared normal last echo. Grade II diastolic dysfunction  - Echo  - IV lasix 40 BID  - strict I/O  - Daily weights as tolerated     Pain management  - morphine 2mg [id number] PRN  - will adjust as appropriate for patient comfort     HTN  - continue home lisinopril  - increase BP in ED most likely 2/2 pain, pain management as above         PT/OT Eval Status: Pending clearance from Ortho      Code Status:Full Code  FEN: Cardiac diet, NPO at midnight  PPX: [place]  DISPO: F    This patient has been staffed and discussed with Nelson Banda MD.   -----------------------------  Rodrigo Berrios MD, PGY-1  Internal Medicine    Addendum to Resident H& P/Progress note:  I have personally seen,examined and evaluated the patient.  I have reviewed the current history, physical findings, labs and assessment and plan and agree with note as documented by resident MD ( Isidro Burrell)    2d echo:  Limited echo. Extremely difficult study. Definity contrast administered.    Left ventricular size appears normal. Left ventricular walls appear    thickened, but difficult to assess due to poor visualization. Overall left    ventricular systolic function appears low normal with an estimated ejection    fraction of 50%, but difficult to assess due to poor visualization.    No evidence of significant valvular stenosis or regurgitation present, but    difficult to assess due to poor visualization. Had cardiology consultation by Dr Renan Dutta. May proceed with L hip fracture repair.     Rito Rebolledo MD, FACP

## 2020-08-21 NOTE — PLAN OF CARE
Problem: Falls - Risk of:  Goal: Will remain free from falls  Description: Will remain free from falls  Outcome: Ongoing  Note: Patient alert and oriented X4, non-skid socks on, bed in lowest position and locked, side rails up X2, call light and belongings within reach, bed alarm on for safety, and fall sign posted. Will continue to monitor. Problem: Pain:  Goal: Pain level will decrease  Description: Pain level will decrease  Outcome: Ongoing  Note: Pt was complaining of 9 out of 10 pain to Left hip. Pt was medicated per MAR. Upon reassessment pt pain had decreased. Will continue to monitor.

## 2020-08-21 NOTE — CONSULTS
Orthopedic surgery consult    CC: Left hip fracture    HPI: 80year old male sustained a fall from standing height at home. The patient uses a walker at baseline. The patient experienced left groin pain and difficulty with weight bearing in regards to his left lower extremity. Upon presentation to the ED at [facility], the patient was found to have a minimally displaced/valgus impacted left femoral neck fracture. Orthopedic surgery was consulted for evaluation and management. I reviewed this patient's past medical, surgical, social, and family history. I reviewed this patient's current home medications, allergies, and ROS from the patient's fully dictated admission H&P performed on 8/20 by Dr. Estefania Stringer. I agree with all documented findings and have no additions. PE:  AAOx3  LLE demonstrates no gross deformity  Skin intact  +TTP left groin  +Pain in the left groin with passive logroll of left hip  LLE NV intact L1-S1 with gross motor/sensory function intact  +Palpable LLE pedal pulses  Left thigh/leg compartments soft/compressible    LUE demonstrates superficial dry abrasions over the left elbow  +echymossis  Minimal LUE tenderness to palpation  Patient demonstrates full active ROM of left shoulder, elbow and wrist with minimal pain    Radiographs:  Left hip xrays and CT left hip demonstrate a valgus impacted left femoral neck fracture    A/P:   1. OCTOR Saturday 8/22 for perc pinning left hip  2. NPO after midnight  3. Pain control  4. DVT prophylaxis  5. Bedrest  6.  Medical/Cardiology optimization

## 2020-08-21 NOTE — PROGRESS NOTES
Informed Resident of Pt urine appearing more blood tinged than at the time of the gonzales being placed.  Waiting on response

## 2020-08-21 NOTE — PROGRESS NOTES
Pt showing signs of urinary retention, Pt bladder scanned for 800mL. MD notified. Chambers catheter placed per order. Pt tolerated well. Urine was returned, secure device on Pt Right leg.  Will continue to monitor

## 2020-08-21 NOTE — CARE COORDINATION
Case Management Assessment           Initial Evaluation                Date / Time of Evaluation: 8/21/2020 12:50 PM                 Assessment Completed by: Margret Garcia     I spoke with the patient at bedside regarding discharge needs. Pt is from home with his two sons. They do work but they are there most of the time. He normally ambulates with a walker at home and they have a wheelchair as well. The house is one level with a small step to enter. He prefers to go home at d/c but if he needs rehab he would like to go to [facility]. I sent a referral to them to review. Surgery planned for Saturday pending ECHO. Patient Name: Giana Mathias     YOB: 1925  Diagnosis: Closed left hip fracture, initial encounter [facility]) [address]  Closed left hip fracture, initial encounter [facility]) [address]     Date / Time: 8/20/2020  1:01 PM    Patient Admission Status: Inpatient    If patient is discharged prior to next notation, then this note serves as note for discharge by case management.      Current PCP: Maye Kim MD  Clinic Patient: No    Chart Reviewed: Yes  Patient/ Family Interviewed: Yes    Initial assessment completed at bedside with: patient    Hospitalization in the last 30 days: No    Emergency Contacts:  Extended Emergency Contact Information  Primary Emergency Contact: Isi Freeman [address] Phone: [phone]  Mobile Phone: [phone]  Relation: Child  Secondary Emergency Contact: Bessie Meraz  Address: [address] [place] [address] Phone: [phone]  Relation: Spouse    Advance Directives:   Code Status: [address]: No  Agent: NA  Contact Number: NA    Financial  Payor: MEDICARE / Plan: MEDICARE PART A AND B / Product Type: *No Product type* /     Pre-cert required for SNF: [facility], [facility] [address] [facility] [address]  Phone: [phone] Fax: [phone]      Potential assistance Purchasing Medications: Potential Assistance Purchasing Medications: No  Does Patient want to participate in local refill/ meds to beds program?: Yes    Meds To Beds General Rules:  1. Can ONLY be done Monday- Friday between 8:30am-5pm  2. Prescription(s) must be in pharmacy by 3pm to be filled same day  3. Copy of patient's insurance/ prescription drug card and patient face sheet must be sent along with the prescription(s)  4. Cost of Rx cannot be added to hospital bill. If financial assistance is needed, please contact unit  or ;  or  CANNOT provide pharmacy voucher for patients co-pays  5.  Patients can then  the prescription on their way out of the hospital at discharge, or pharmacy can deliver to the bedside if staff is available. (payment due at time of pick-up or delivery - cash, check, or card accepted)     Able to afford home medications/ co-pay costs: Yes    ADLS  Support Systems: Children, Family Members    PT AM-PAC:   /24  OT AM-PAC:   /24    [place]: single level home  Steps: 1    Plans to RETURN to current housing: Yes  Barriers to RETURNING to current housing: may need rehab first      DISCHARGE PLAN:  Disposition: TBD    Transportation PLAN for discharge: TBD     Factors facilitating achievement of predicted outcomes: Family support, Cooperative and Pleasant    Barriers to discharge: ECHO and surgery planned for Sat    Additional Case Management Notes: NA    The Plan for Transition of Care is related to the following treatment goals of Closed left hip fracture, initial encounter ([facility] [S72.002A]  Closed left hip fracture, initial encounter ([facility] Derl Frame    The Patient and/or patient representative Elena Al and his family were provided with a choice of provider and agrees with the discharge plan Yes    Freedom of choice list was provided with basic dialogue that supports the patient's individualized plan of care/goals and shares the quality data associated with the providers.  Yes    Care Transition patient: No    Yo Villa RN  [facility]  Case Management Department  Ph: [phone]   CKP:[phone]

## 2020-08-21 NOTE — PROGRESS NOTES
Pt moved to a speciality mattress. Pt being turned Q2 hours. Pt refused oral suctioning by RN at this time. Pt denies pain but is having nausea/vomitting with movement. Pt is A/O x4, calm and cooperative. Pt is scheduled for surgery Tomorrow. Son Jimmie Urbano was updated on Pt plan of care.  Will continue to monitor

## 2020-08-21 NOTE — ED PROVIDER NOTES
ED Attending Attestation Note     Date of evaluation: 8/20/2020    This patient was seen by the resident. I have seen and examined the patient, agree with the workup, evaluation, management and diagnosis. The care plan has been discussed. I have reviewed the ECG and concur with the resident's interpretation. My assessment reveals no acute ischemic changes on EKG. Patient's work-up notable for negative troponin, elevated BNP of 1900 with no known baseline. Unclear reason for the fall today, presumed syncope of unknown etiology. Patient's CT head and C-spine and chest xray negative for acute pathology. Pelvic CT with possible left femoral neck fracture followed up by plain film of the femur also interpreted as no acute fracture, however given high clinical suspicion based on tenderness, external rotation, and limited range of motion this was followed by CT pelvis. The CT demonstrated a subcapital femoral neck fracture on the left. Orthopedic surgery was consulted and patient was admitted to hospital medicine for further management.     Karen Hook MD  [facility]  Emergency Medicine        Karen Hook MD  08/20/20 7725
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
## 2020-08-21 NOTE — PROGRESS NOTES
Patient admitted to 5509, BP elevated with all other vitals MD NAVI notified of elevated BP and patient pain levels, currently only acetaminophen listed as PRN. Patient on strict bedrest at this time, urinal provided for patient to void. NPO at midnight for possible surgery.  Denies needs at this time, will continue to monitor and reassess

## 2020-08-21 NOTE — PROGRESS NOTES
4 Eyes Admission Assessment     I agree as the admission nurse that 2 RN's have performed a thorough Head to Toe Skin Assessment on the patient. ALL assessment sites listed below have been assessed on admission. Areas assessed by both nurses:  [x]   Head, Face, and Ears   [x]   Shoulders, Back, and Chest  [x]   Arms, Elbows, and Hands  Scattered bruising, skin tear left elbow  [x]   Coccyx, Sacrum, and Ischum  [x]   Legs, Feet, and Heels +1 pitting edema LE (baseline)        Does the Patient have Skin Breakdown?   No         Eriberto Prevention initiated:  No   Wound Care Orders initiated:  No      [facility] nurse consulted for Pressure Injury (Stage 3,4, Unstageable, DTI, NWPT, and Complex wounds) or Eriberto score 18 or lower:  No      Nurse 1 eSignature: Electronically signed by Kiki Omalley RN on 8/21/20 at 4:59 AM EDT    **SHARE this note so that the co-signing nurse is able to place an eSignature**    Nurse 2 eSignature: Electronically signed by Ursula Mart RN on 8/21/20 at 5:36 AM EDT

## 2020-08-21 NOTE — PLAN OF CARE
Ortho Plan of Care    I reviewed the patient's imaging demonstrating a left femoral neck fracture  Will plan for percutaneous screw fixation of left hip Saturday morning  Cardiac optimization tomorrow  NPO after midnight tomorrow night  Bedrest  Pain control  DVT prophylaxis  Full consult to follow

## 2020-08-22 ENCOUNTER — APPOINTMENT (OUTPATIENT)
Dept: GENERAL RADIOLOGY | Age: 85
DRG: 481 | End: 2020-08-22
Payer: MEDICARE

## 2020-08-22 ENCOUNTER — ANESTHESIA (OUTPATIENT)
Dept: OPERATING ROOM | Age: 85
DRG: 481 | End: 2020-08-22
Payer: MEDICARE

## 2020-08-22 VITALS
RESPIRATION RATE: 19 BRPM | DIASTOLIC BLOOD PRESSURE: 51 MMHG | TEMPERATURE: 99.5 F | OXYGEN SATURATION: 96 % | SYSTOLIC BLOOD PRESSURE: 98 MMHG

## 2020-08-22 LAB
ALBUMIN SERPL-MCNC: 3.3 G/DL (ref 3.4–5)
ANION GAP SERPL CALCULATED.3IONS-SCNC: 8 MMOL/L (ref 3–16)
BUN BLDV-MCNC: 23 MG/DL (ref 7–20)
CALCIUM SERPL-MCNC: 8.2 MG/DL (ref 8.3–10.6)
CHLORIDE BLD-SCNC: 100 MMOL/L (ref 99–110)
CO2: 27 MMOL/L (ref 21–32)
CREAT SERPL-MCNC: 1.3 MG/DL (ref 0.8–1.3)
GFR AFRICAN AMERICAN: >60
GFR NON-AFRICAN AMERICAN: 51
GLUCOSE BLD-MCNC: 90 MG/DL (ref 70–99)
PHOSPHORUS: 4.4 MG/DL (ref 2.5–4.9)
POTASSIUM SERPL-SCNC: 4.2 MMOL/L (ref 3.5–5.1)
SARS-COV-2, NAAT: NOT DETECTED
SODIUM BLD-SCNC: 135 MMOL/L (ref 136–145)

## 2020-08-22 PROCEDURE — 2709999900 HC NON-CHARGEABLE SUPPLY: Performed by: ORTHOPAEDIC SURGERY

## 2020-08-22 PROCEDURE — 3209999900 FLUORO FOR SURGICAL PROCEDURES

## 2020-08-22 PROCEDURE — C1769 GUIDE WIRE: HCPCS | Performed by: ORTHOPAEDIC SURGERY

## 2020-08-22 PROCEDURE — 3700000000 HC ANESTHESIA ATTENDED CARE: Performed by: ORTHOPAEDIC SURGERY

## 2020-08-22 PROCEDURE — C1713 ANCHOR/SCREW BN/BN,TIS/BN: HCPCS | Performed by: ORTHOPAEDIC SURGERY

## 2020-08-22 PROCEDURE — 2580000003 HC RX 258: Performed by: ORTHOPAEDIC SURGERY

## 2020-08-22 PROCEDURE — 1200000000 HC SEMI PRIVATE

## 2020-08-22 PROCEDURE — 80069 RENAL FUNCTION PANEL: CPT

## 2020-08-22 PROCEDURE — 6360000002 HC RX W HCPCS: Performed by: STUDENT IN AN ORGANIZED HEALTH CARE EDUCATION/TRAINING PROGRAM

## 2020-08-22 PROCEDURE — 2720000010 HC SURG SUPPLY STERILE: Performed by: ORTHOPAEDIC SURGERY

## 2020-08-22 PROCEDURE — 99233 SBSQ HOSP IP/OBS HIGH 50: CPT | Performed by: INTERNAL MEDICINE

## 2020-08-22 PROCEDURE — 2580000003 HC RX 258: Performed by: STUDENT IN AN ORGANIZED HEALTH CARE EDUCATION/TRAINING PROGRAM

## 2020-08-22 PROCEDURE — 0QS734Z REPOSITION LEFT UPPER FEMUR WITH INTERNAL FIXATION DEVICE, PERCUTANEOUS APPROACH: ICD-10-PCS | Performed by: ORTHOPAEDIC SURGERY

## 2020-08-22 PROCEDURE — 27235 TREAT THIGH FRACTURE: CPT | Performed by: ORTHOPAEDIC SURGERY

## 2020-08-22 PROCEDURE — 73502 X-RAY EXAM HIP UNI 2-3 VIEWS: CPT

## 2020-08-22 PROCEDURE — 6370000000 HC RX 637 (ALT 250 FOR IP): Performed by: ORTHOPAEDIC SURGERY

## 2020-08-22 PROCEDURE — 3600000014 HC SURGERY LEVEL 4 ADDTL 15MIN: Performed by: ORTHOPAEDIC SURGERY

## 2020-08-22 PROCEDURE — 2580000003 HC RX 258: Performed by: ANESTHESIOLOGY

## 2020-08-22 PROCEDURE — 7100000001 HC PACU RECOVERY - ADDTL 15 MIN: Performed by: ORTHOPAEDIC SURGERY

## 2020-08-22 PROCEDURE — 3600000004 HC SURGERY LEVEL 4 BASE: Performed by: ORTHOPAEDIC SURGERY

## 2020-08-22 PROCEDURE — 99232 SBSQ HOSP IP/OBS MODERATE 35: CPT | Performed by: HOSPITALIST

## 2020-08-22 PROCEDURE — 6360000002 HC RX W HCPCS: Performed by: ORTHOPAEDIC SURGERY

## 2020-08-22 PROCEDURE — 6370000000 HC RX 637 (ALT 250 FOR IP): Performed by: STUDENT IN AN ORGANIZED HEALTH CARE EDUCATION/TRAINING PROGRAM

## 2020-08-22 PROCEDURE — 7100000000 HC PACU RECOVERY - FIRST 15 MIN: Performed by: ORTHOPAEDIC SURGERY

## 2020-08-22 PROCEDURE — 36415 COLL VENOUS BLD VENIPUNCTURE: CPT

## 2020-08-22 PROCEDURE — 3700000001 HC ADD 15 MINUTES (ANESTHESIA): Performed by: ORTHOPAEDIC SURGERY

## 2020-08-22 PROCEDURE — 6360000002 HC RX W HCPCS: Performed by: ANESTHESIOLOGY

## 2020-08-22 PROCEDURE — 2500000003 HC RX 250 WO HCPCS: Performed by: ORTHOPAEDIC SURGERY

## 2020-08-22 DEVICE — IMPLANTABLE DEVICE: Type: IMPLANTABLE DEVICE | Site: HIP | Status: FUNCTIONAL

## 2020-08-22 DEVICE — SCREW BNE L95MM DIA7.3MM THRD L16MM CANC S STL SELF DRL ST: Type: IMPLANTABLE DEVICE | Site: HIP | Status: FUNCTIONAL

## 2020-08-22 DEVICE — WASHER ORTH DIA13MM FOR CANN SCR: Type: IMPLANTABLE DEVICE | Site: HIP | Status: FUNCTIONAL

## 2020-08-22 RX ORDER — PROPOFOL 10 MG/ML
INJECTION, EMULSION INTRAVENOUS PRN
Status: DISCONTINUED | OUTPATIENT
Start: 2020-08-22 | End: 2020-08-22 | Stop reason: SDUPTHER

## 2020-08-22 RX ORDER — FENTANYL CITRATE 50 UG/ML
50 INJECTION, SOLUTION INTRAMUSCULAR; INTRAVENOUS EVERY 5 MIN PRN
Status: DISCONTINUED | OUTPATIENT
Start: 2020-08-22 | End: 2020-08-22 | Stop reason: HOSPADM

## 2020-08-22 RX ORDER — OXYCODONE HYDROCHLORIDE 5 MG/1
5 TABLET ORAL EVERY 6 HOURS PRN
Status: DISCONTINUED | OUTPATIENT
Start: 2020-08-22 | End: 2020-08-24 | Stop reason: HOSPADM

## 2020-08-22 RX ORDER — CLOPIDOGREL BISULFATE 75 MG/1
75 TABLET ORAL DAILY
Status: DISCONTINUED | OUTPATIENT
Start: 2020-08-23 | End: 2020-08-24 | Stop reason: HOSPADM

## 2020-08-22 RX ORDER — ONDANSETRON 2 MG/ML
4 INJECTION INTRAMUSCULAR; INTRAVENOUS
Status: DISCONTINUED | OUTPATIENT
Start: 2020-08-22 | End: 2020-08-22 | Stop reason: HOSPADM

## 2020-08-22 RX ORDER — OXYCODONE HYDROCHLORIDE 5 MG/1
10 TABLET ORAL EVERY 8 HOURS PRN
Status: DISCONTINUED | OUTPATIENT
Start: 2020-08-22 | End: 2020-08-24 | Stop reason: HOSPADM

## 2020-08-22 RX ORDER — FENTANYL CITRATE 50 UG/ML
INJECTION, SOLUTION INTRAMUSCULAR; INTRAVENOUS PRN
Status: DISCONTINUED | OUTPATIENT
Start: 2020-08-22 | End: 2020-08-22 | Stop reason: SDUPTHER

## 2020-08-22 RX ORDER — TRAMADOL HYDROCHLORIDE 50 MG/1
50 TABLET ORAL EVERY 4 HOURS PRN
Status: DISCONTINUED | OUTPATIENT
Start: 2020-08-22 | End: 2020-08-24 | Stop reason: HOSPADM

## 2020-08-22 RX ORDER — SODIUM CHLORIDE 9 MG/ML
INJECTION, SOLUTION INTRAVENOUS CONTINUOUS PRN
Status: DISCONTINUED | OUTPATIENT
Start: 2020-08-22 | End: 2020-08-22 | Stop reason: SDUPTHER

## 2020-08-22 RX ORDER — ACETAMINOPHEN 500 MG
1000 TABLET ORAL EVERY 8 HOURS SCHEDULED
Status: DISCONTINUED | OUTPATIENT
Start: 2020-08-22 | End: 2020-08-24 | Stop reason: HOSPADM

## 2020-08-22 RX ORDER — FENTANYL CITRATE 50 UG/ML
25 INJECTION, SOLUTION INTRAMUSCULAR; INTRAVENOUS EVERY 5 MIN PRN
Status: DISCONTINUED | OUTPATIENT
Start: 2020-08-22 | End: 2020-08-22 | Stop reason: HOSPADM

## 2020-08-22 RX ORDER — DEXAMETHASONE SODIUM PHOSPHATE 4 MG/ML
4 INJECTION, SOLUTION INTRA-ARTICULAR; INTRALESIONAL; INTRAMUSCULAR; INTRAVENOUS; SOFT TISSUE
Status: DISCONTINUED | OUTPATIENT
Start: 2020-08-22 | End: 2020-08-22 | Stop reason: HOSPADM

## 2020-08-22 RX ORDER — BUPIVACAINE HYDROCHLORIDE 5 MG/ML
INJECTION, SOLUTION EPIDURAL; INTRACAUDAL PRN
Status: DISCONTINUED | OUTPATIENT
Start: 2020-08-22 | End: 2020-08-22 | Stop reason: ALTCHOICE

## 2020-08-22 RX ORDER — LIDOCAINE HYDROCHLORIDE 20 MG/ML
INJECTION, SOLUTION INTRAVENOUS PRN
Status: DISCONTINUED | OUTPATIENT
Start: 2020-08-22 | End: 2020-08-22 | Stop reason: SDUPTHER

## 2020-08-22 RX ADMIN — OXYCODONE HYDROCHLORIDE 10 MG: 5 TABLET ORAL at 14:26

## 2020-08-22 RX ADMIN — FUROSEMIDE 20 MG: 10 INJECTION, SOLUTION INTRAMUSCULAR; INTRAVENOUS at 08:16

## 2020-08-22 RX ADMIN — SODIUM CHLORIDE: 9 INJECTION, SOLUTION INTRAVENOUS at 09:38

## 2020-08-22 RX ADMIN — ENOXAPARIN SODIUM 40 MG: 40 INJECTION SUBCUTANEOUS at 20:23

## 2020-08-22 RX ADMIN — ONDANSETRON 4 MG: 2 INJECTION INTRAMUSCULAR; INTRAVENOUS at 10:10

## 2020-08-22 RX ADMIN — Medication 10 ML: at 20:26

## 2020-08-22 RX ADMIN — CEFAZOLIN 2 G: 10 INJECTION, POWDER, FOR SOLUTION INTRAVENOUS at 17:45

## 2020-08-22 RX ADMIN — ACETAMINOPHEN 1000 MG: 500 TABLET ORAL at 22:34

## 2020-08-22 RX ADMIN — LISINOPRIL 20 MG: 20 TABLET ORAL at 08:16

## 2020-08-22 RX ADMIN — TAMSULOSIN HYDROCHLORIDE 0.4 MG: 0.4 CAPSULE ORAL at 20:24

## 2020-08-22 RX ADMIN — ATORVASTATIN CALCIUM 80 MG: 80 TABLET, FILM COATED ORAL at 08:16

## 2020-08-22 RX ADMIN — Medication 10 ML: at 08:17

## 2020-08-22 RX ADMIN — FENTANYL CITRATE 50 MCG: 50 INJECTION INTRAMUSCULAR; INTRAVENOUS at 09:41

## 2020-08-22 RX ADMIN — LIDOCAINE HYDROCHLORIDE 50 MG: 20 INJECTION, SOLUTION INTRAVENOUS at 09:43

## 2020-08-22 RX ADMIN — PROPOFOL 100 MG: 10 INJECTION, EMULSION INTRAVENOUS at 09:45

## 2020-08-22 RX ADMIN — CEFAZOLIN 2 G: 10 INJECTION, POWDER, FOR SOLUTION INTRAVENOUS at 09:43

## 2020-08-22 RX ADMIN — OXYCODONE HYDROCHLORIDE 10 MG: 5 TABLET ORAL at 22:34

## 2020-08-22 ASSESSMENT — PULMONARY FUNCTION TESTS
PIF_VALUE: 3
PIF_VALUE: 2
PIF_VALUE: 19
PIF_VALUE: 5
PIF_VALUE: 6
PIF_VALUE: 6
PIF_VALUE: 2
PIF_VALUE: 3
PIF_VALUE: 3
PIF_VALUE: 1
PIF_VALUE: 5
PIF_VALUE: 3
PIF_VALUE: 3
PIF_VALUE: 2
PIF_VALUE: 1
PIF_VALUE: 3
PIF_VALUE: 2
PIF_VALUE: 2
PIF_VALUE: 3
PIF_VALUE: 3
PIF_VALUE: 15
PIF_VALUE: 3
PIF_VALUE: 3
PIF_VALUE: 20
PIF_VALUE: 2
PIF_VALUE: 2
PIF_VALUE: 1
PIF_VALUE: 2
PIF_VALUE: 1
PIF_VALUE: 3
PIF_VALUE: 2
PIF_VALUE: 3
PIF_VALUE: 2
PIF_VALUE: 3
PIF_VALUE: 1
PIF_VALUE: 2
PIF_VALUE: 2
PIF_VALUE: 1
PIF_VALUE: 4
PIF_VALUE: 18
PIF_VALUE: 5
PIF_VALUE: 3
PIF_VALUE: 2
PIF_VALUE: 11
PIF_VALUE: 2
PIF_VALUE: 1
PIF_VALUE: 3

## 2020-08-22 ASSESSMENT — PAIN SCALES - GENERAL
PAINLEVEL_OUTOF10: 3
PAINLEVEL_OUTOF10: 7
PAINLEVEL_OUTOF10: 8

## 2020-08-22 ASSESSMENT — PAIN DESCRIPTION - LOCATION: LOCATION: HIP

## 2020-08-22 ASSESSMENT — PAIN DESCRIPTION - PAIN TYPE: TYPE: SURGICAL PAIN

## 2020-08-22 ASSESSMENT — PAIN DESCRIPTION - FREQUENCY: FREQUENCY: CONTINUOUS

## 2020-08-22 ASSESSMENT — PAIN DESCRIPTION - ONSET: ONSET: ON-GOING

## 2020-08-22 ASSESSMENT — PAIN DESCRIPTION - ORIENTATION: ORIENTATION: LEFT

## 2020-08-22 ASSESSMENT — PAIN DESCRIPTION - DESCRIPTORS: DESCRIPTORS: ACHING

## 2020-08-22 ASSESSMENT — PAIN DESCRIPTION - PROGRESSION: CLINICAL_PROGRESSION: NOT CHANGED

## 2020-08-22 NOTE — FLOWSHEET NOTE
Patient received from the OR to pACU #13 post LEFT HIP PERCUTANUOUS PINNING - Left of Dr. Prieto Grace. Placed on PACU monitoring equipment. Report given per Dr. Dannielle Waller. Per report, patient was stable during the procedure. On arrival, patient is arouseable, christopher and denies pain.

## 2020-08-22 NOTE — OP NOTE
Operative Note      Patient: Pavel Serrano  YOB: 1925  MRN: [id number]    Date of Procedure: 8/22/2020    Pre-Op Diagnosis: LEFT valgus impacted/nondisplaced subcapital femoral neck fracture    Post-Op Diagnosis: Same       Procedure(s):  LEFT HIP closed reduction percutaneous screw fixation  Physician directed fluoroscopy <60 minutes    Surgeon(s):  Jalil Campos DO    Assistant:   Surgical Assistant: Gretta Leon    Anesthesia: General plus 20cc 0.5% marcaine no epi local anesthetic    Estimated Blood Loss (mL): Minimal    Complications: None    Specimens:   * No specimens in log *    Implants:  * No implants in log *      Drains:   Urethral Catheter (Active)   Catheter Indications Acute urinary retention/obstruction 08/22/20 0714   Site Assessment No urethral drainage 08/22/20 0714   Urine Color Thuy 08/22/20 0714   Urine Appearance Clear 08/22/20 0714   Output (mL) 250 mL 08/22/20 0514       Findings: as above    Detailed Description of Procedure: On the day of the operation the patient was met in the preop holding area. The operative extremity was signed by myself and all appropriate preop paperwork was completed. The patient was taken back to the OR suite and transferred to the OR table where he was positioned supine making sure to pad all bony prominences appropriately. The patient was intubated and GETA was administered without complication. Ancef 2 grams IV was administered <30 minutes prior to surgical incision for SSI prophylaxis. SCD's were applied to the bilateral LE's. A preop alcohol scrub was performed to the operative extremity. AP and Lateral fluoro imaging confirmed a nondisplaced left femoral neck fracture. The operative extremity was then prepped and draped in the usual sterile fashion. A final timeout was performed.   Using percutaneous technique and fluoro guidance 3 separate threaded tip guidewires were passed across the femoral neck and into the subchondral bone of the femoral head in an inverted triangle configuration. Three percutaneous stab incisions were made. After our screw lengths were measured, we drilled and placed our 3 7.3mm partially threaded cannulated screws over our guidewires. Our guidewires were removed. Final AP and lateral fluoro images were taken of the left hip and saved to the [facility] PACS system. These images confirmed an anatomic reduction of the patient's fracture and appropriately placed hardware with no complication. Less than 60 minutes of physician directed fluoroscopy was used throughout this case for the above mentioned purposes. 20cc of 0.5% marcaine with no epi was instilled into the tissues for local anesthetic. Incisions were irrigated with sterile saline. Incisions were closed with 3-0 monocryl suture. The skin was secured with 1/2 inch steri strips. The wounds were dressed with a sterile Mepilex silver dressing. The patient was then extubated and awoken from GETA without complication. The patient was transferred from the OR table to her hospital bed and then to PACU in stable condition.       Electronically signed by Ernesto Negro DO on 8/22/2020 at 10:15 AM

## 2020-08-22 NOTE — PROGRESS NOTES
[facility]  Cardiology Inpatient Consult Service  Daily Progress Note        Admit Date:  8/20/2020    Referring Physician: Francisco Javier Garcia MD    Reason for Consultation/Chief Complaint:   Preop cardiovascular assessment/CAD/chronic systolic heart failure      Subjective: Interval history:  No acute events, resting comfortably this morning. Will be going to the OR today. Medications:   atorvastatin  80 mg Oral Daily    levothyroxine  50 mcg Oral QAM AC    lisinopril  20 mg Oral Daily    sodium chloride flush  10 mL Intravenous 2 times per day    enoxaparin  40 mg Subcutaneous Daily    [Held by provider] furosemide  20 mg Intravenous BID    aspirin  81 mg Oral Daily    tamsulosin  0.4 mg Oral Nightly       IV drips:      PRN:  fentanNYL, fentanNYL, ondansetron, dexamethasone, sodium chloride flush, acetaminophen **OR** acetaminophen, polyethylene glycol, promethazine **OR** ondansetron, morphine      Objective:     Vitals:    08/21/20 2245 08/22/20 0300 08/22/20 0600 08/22/20 0714   BP: (!) 144/62 (!) 149/64  (!) 154/54   Pulse: 79 70  56   Resp: 16 16  16   Temp: 99.6 °F (37.6 °C) 98.6 °F (37 °C)  97.7 °F (36.5 °C)   TempSrc: Oral Oral  Oral   SpO2: 91% 90%  93%   Weight:   186 lb 11.2 oz (84.7 kg)        Intake/Output Summary (Last 24 hours) at 8/22/2020 0941  Last data filed at 8/22/2020 0514  Gross per 24 hour   Intake 120 ml   Output 1550 ml   Net -1430 ml     I/O last 3 completed shifts:   In: 190 [P.O.:180; I.V.:10]  Out: 1550 [Urine:1550]  Wt Readings from Last 3 Encounters:   08/22/20 186 lb 11.2 oz (84.7 kg)   07/10/18 199 lb (90.3 kg)   05/30/18 198 lb 6.4 oz (90 kg)       Admit Wt: Weight: 186 lb 11.2 oz (84.7 kg)   Todays Wt: Weight: 186 lb 11.2 oz (84.7 kg)    TELEMETRY: Sinus     Physical Exam:     General:  Awake, alert, NAD  Skin:  Warm and dry  Neck:  -JVP  Chest:  Clear to auscultation, respiration normal  Cardiovascular:  RRR S1S2  Abdomen:  Soft -  Extremities:  - edema      Objective    Labs:   Recent Labs     08/20/20  1412 08/21/20  0538 08/22/20  0502   * 135* 135*   K 4.8 4.4 4.2   BUN 13 16 23*   CREATININE 1.1 1.2 1.3    100 100   CO2 24 24 27   GLUCOSE 111* 112* 90   CALCIUM 8.9 8.6 8.2*     Recent Labs     08/20/20  1412 08/21/20  0538   WBC 12.4* 10.2   HGB 12.8* 11.6*   HCT 39.0* 33.6*   * 109*   MCV 98.0 95.3     No results for input(s): CHOLTOT, TRIG, HDL in the last 72 hours. Invalid input(s): LIPIDCOMM, CHOLHDL, VLDCHOL, LDL  No results for input(s): PTT, INR in the last 72 hours. Invalid input(s): PT  Recent Labs     08/20/20  1412   TROPONINI 0.02*     No results for input(s): BNP in the last 72 hours. No results for input(s): NTPROBNP in the last 72 hours. No results for input(s): TSH in the last 72 hours. Imaging:   I personally reviewed imaging studies including CXR, Stress test, TTE/ASHLIE. Assessment & Plan:     Preop cardiovascular assessment/CAD/chronic systolic heart failure  -To the OR today.  -We will follow peripherally after surgery  -TTE yesterday with EF of 50.  -Telemetry postsurgery. Thank you for allowing to us to participate in the care of Nadine Fernandes. Please call our service with questions. All questions and concerns were addressed to the patient/family. Alternatives to my treatment were discussed. The note was completed using EMR. Every effort was made to ensure accuracy; however, inadvertent computerized transcription errors may be present. I have personally reviewed the reports and images of labs, radiological studies, cardiac studies including ECG's and telemetry, current and old medical records. Fabian Babcock MD, [address]    8/22/2020 9:41 AM

## 2020-08-22 NOTE — ANESTHESIA PRE PROCEDURE
DO        Or    acetaminophen (TYLENOL) suppository 650 mg  650 mg Rectal Q6H PRN Rulon J Laurel [place], DO        polyethylene glycol (GLYCOLAX) packet 17 g  17 g Oral Daily PRN Ruadolfo J [place], DO        promethazine (PHENERGAN) tablet 12.5 mg  12.5 mg Oral Q6H PRN Rulon J Bettye, DO        Or    ondansetron (ZOFRAN) injection 4 mg  4 mg Intravenous Q6H PRN Penny J [place], DO   4 mg at 08/21/20 1224    enoxaparin (LOVENOX) injection 40 mg  40 mg Subcutaneous Daily Rulon J Bettye, DO   Stopped at 08/22/20 2755    [Held by provider] furosemide (LASIX) injection 20 mg  20 mg Intravenous BID Rulon J Bettye, DO   20 mg at 08/22/20 3514    aspirin EC tablet 81 mg  81 mg Oral Daily Rulokuldeep J Bettye, DO   Stopped at 08/22/20 3426    tamsulosin (FLOMAX) capsule 0.4 mg  0.4 mg Oral Nightly Ruadolfo SANTIAGO [place], DO   0.4 mg at 08/21/20 2013    morphine (PF) injection 2 mg  2 mg Intravenous Q4H PRN Susan Lauren MD   2 mg at 08/21/20 1746       Allergies:  No Known Allergies    Problem List:    Patient Active Problem List   Diagnosis Code    Hypertension I10    Hyperlipidemia E78.5    CAD (coronary artery disease) I25.10    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 Leim Lewis    Chronic diastolic heart failure (HCC) I50.32    Bilateral lower extremity edema R60.0       Past Medical History:        Diagnosis Date    Arthritis     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 symptoms     [facility]    CAD (coronary artery disease)     stents x 3 - dr Lloyd Rota   [facility] DVT (deep venous thrombosis) ([facility] [address] 2003    right leg    Hemorrhoids     Hyperlipidemia     Hypertension     PUD (peptic ulcer disease)     Sciatica     War injury due to shrapnel     leg       Past Surgical History:        Procedure Laterality Date    APPENDECTOMY      CATARACT REMOVAL Bilateral     CORONARY ANGIOPLASTY WITH STENT PLACEMENT      HEMORRHOID SURGERY      HERNIA REPAIR      inguinal    REPAIR RETINAL TEAR/HOLE Left     laser repair       Social History:    Social History     Tobacco Use    Smoking status: Never Smoker    Smokeless tobacco: Never Used   Substance Use Topics    Alcohol use: No                                Counseling given: Not Answered      Vital Signs (Current):   Vitals:    08/21/20 2245 08/22/20 0300 08/22/20 0600 08/22/20 0714   BP: (!) 144/62 (!) 149/64  (!) 154/54   Pulse: 79 70  56   Resp: 16 16  16   Temp: 99.6 °F (37.6 °C) 98.6 °F (37 °C)  97.7 °F (36.5 °C)   TempSrc: Oral Oral  Oral   SpO2: 91% 90%  93%   Weight:   186 lb 11.2 oz (84.7 kg)                                               BP Readings from Last 3 Encounters:   08/22/20 (!) 154/54   07/10/18 (!) 132/58   05/30/18 120/62       NPO Status:                                                                                 BMI:   Wt Readings from Last 3 Encounters:   08/22/20 186 lb 11.2 oz (84.7 kg)   07/10/18 199 lb (90.3 kg)   05/30/18 198 lb 6.4 oz (90 kg)     Body mass index is 23.34 kg/m².     CBC:   Lab Results   Component Value Date    WBC 10.2 08/21/2020    RBC 3.53 08/21/2020    HGB 11.6 08/21/2020    HCT 33.6 08/21/2020    MCV 95.3 08/21/2020    RDW 13.7 08/21/2020     08/21/2020       CMP:   Lab Results   Component Value Date     08/22/2020    K 4.2 08/22/2020    K 4.8 08/20/2020     08/22/2020    CO2 27 08/22/2020    BUN 23 08/22/2020    CREATININE 1.3 08/22/2020    GFRAA >60 08/22/2020    GFRAA >60 06/08/2012    AGRATIO 2.0 07/19/2017    LABGLOM 51 08/22/2020    GLUCOSE 90 08/22/2020    PROT 6.5 07/19/2017    PROT 6.8 06/08/2012    CALCIUM 8.2 08/22/2020    BILITOT 0.7 07/19/2017    ALKPHOS 157 07/19/2017    AST 97 07/19/2017     07/19/2017       POC Tests: No results for input(s): POCGLU, POCNA, POCK, POCCL, POCBUN, POCHEMO, POCHCT in the last 72 hours. Coags:   Lab Results   Component Value Date    PROTIME 11.8 05/25/2016    INR 1.03 05/25/2016       HCG (If Applicable): No results found for: PREGTESTUR, PREGSERUM, HCG, HCGQUANT     ABGs: No results found for: PHART, PO2ART, WLS2JDY, NDA6QTR, BEART, T2IQLEZU     Type & Screen (If Applicable):  No results found for: LABABO, LABRH    Drug/Infectious Status (If Applicable):  No results found for: HIV, HEPCAB    COVID-19 Screening (If Applicable):   Lab Results   Component Value Date    COVID19 Not Detected 08/21/2020    COVID19 Not Detected 08/21/2020         Anesthesia Evaluation  Patient summary reviewed and Nursing notes reviewed  Airway: Mallampati: II  TM distance: >3 FB   Neck ROM: full  Mouth opening: > = 3 FB Dental: normal exam         Pulmonary:Negative Pulmonary ROS and normal exam  breath sounds clear to auscultation                             Cardiovascular:  Exercise tolerance: no interval change,   (+) hypertension:, CAD:,       ECG reviewed  Rhythm: regular  Rate: normal  Echocardiogram reviewed         Beta Blocker:  Not on Beta Blocker         Neuro/Psych:   (+) neuromuscular disease:,             GI/Hepatic/Renal:   (+) PUD,           Endo/Other:    (+) hypothyroidism::., .                 Abdominal:       Abdomen: soft. Vascular: negative vascular ROS. Anesthesia Plan      general     ASA 4       Induction: intravenous. MIPS: Postoperative opioids intended and Prophylactic antiemetics administered. Anesthetic plan and risks discussed with patient. Use of blood products discussed with patient whom consented to blood products.    Plan discussed with attending

## 2020-08-22 NOTE — PROGRESS NOTES
Progress Note  PGY-1      Patient's PCP: Basil Mancilla MD    Interval History     No acute event over night. Patient was seen at bedside. He has pain in his left leg only with movement (6/10). He denies chest pain, nausea, vomiting. MEDICATIONS:     No current facility-administered medications on file prior to encounter. Current Outpatient Medications on File Prior to Encounter   Medication Sig Dispense Refill    tamsulosin (FLOMAX) 0.4 MG capsule Take 0.4 mg by mouth nightly      levothyroxine (SYNTHROID) 50 MCG tablet Take 50 mcg by mouth Daily      torsemide (DEMADEX) 20 MG tablet Take 0.5 tablets by mouth daily 30 tablet 3    pantoprazole (PROTONIX) 40 MG tablet Take 1 tablet by mouth 2 times daily 60 tablet 3    lisinopril (PRINIVIL) 20 MG tablet Take 1 tablet by mouth daily. 30 tablet 3    clopidogrel (PLAVIX) 75 MG tablet Take 75 mg by mouth daily.  aspirin 81 MG tablet Take 81 mg by mouth daily.  atorvastatin (LIPITOR) 80 MG tablet Take 1 tablet by mouth daily. 30 tablet 11         Scheduled Meds:   atorvastatin  80 mg Oral Daily    levothyroxine  50 mcg Oral QAM AC    lisinopril  20 mg Oral Daily    sodium chloride flush  10 mL Intravenous 2 times per day    enoxaparin  40 mg Subcutaneous Daily    [Held by provider] furosemide  20 mg Intravenous BID    aspirin  81 mg Oral Daily    tamsulosin  0.4 mg Oral Nightly      Continuous Infusions:  PRN Meds:sodium chloride flush, acetaminophen **OR** acetaminophen, polyethylene glycol, promethazine **OR** ondansetron, morphine    Allergies: No Known Allergies    PHYSICAL EXAM:       Vitals: BP (!) 154/54   Pulse 56   Temp 97.7 °F (36.5 °C) (Oral)   Resp 16   Wt 186 lb 11.2 oz (84.7 kg)   SpO2 93%   BMI 23.34 kg/m²     I/O:      Intake/Output Summary (Last 24 hours) at 8/22/2020 0837  Last data filed at 8/22/2020 0514  Gross per 24 hour   Intake 190 ml   Output 1550 ml   Net -1360 ml     No intake/output data recorded.   I/O last 3 completed shifts: In: 190 [P.O.:180; I.V.:10]  Out: 1550 [Urine:1550]    Physical Examination:   ? General appearance: Alert, appears stated age and cooperative. ? Skin: Skin is dry. ? HEENT: Head: Normocephalic. ? Eye: SARATH. ? Neck: No tenderness/mass/nodules. ? Lungs: Clear to auscultation bilaterally. ? Heart: No murmur, click, rub or gallop. ? Abdomen: soft, non-tender; bowel sounds normal.  ? Lymphatic: No significant lymph node enlargement papable. ? Neurologic: Alert, oriented, thought content appropriate. DATA:       Labs:  CBC:   Recent Labs     08/20/20  1412 08/21/20  0538   WBC 12.4* 10.2   HGB 12.8* 11.6*   HCT 39.0* 33.6*   * 109*       BMP:   Recent Labs     08/20/20  1412 08/21/20  0538 08/22/20  0502   * 135* 135*   K 4.8 4.4 4.2    100 100   CO2 24 24 27   BUN 13 16 23*   CREATININE 1.1 1.2 1.3   GLUCOSE 111* 112* 90     LFT's: No results for input(s): AST, ALT, ALB, BILITOT, ALKPHOS in the last 72 hours. Troponin:   Recent Labs     08/20/20  1412   TROPONINI 0.02*     BNP: No results for input(s): BNP in the last 72 hours. ABGs: No results for input(s): PHART, XVO6HTD, PO2ART in the last 72 hours. INR: No results for input(s): INR in the last 72 hours. Lipids: No results for input(s): CHOL, HDL in the last 72 hours. Invalid input(s): LDLCALCU    U/A:  Recent Labs     08/20/20  1452   COLORU Yellow   PHUR 7.5   WBCUA 10-20*   RBCUA 0-2   BACTERIA 1+*   CLARITYU Clear   SPECGRAV 1.010   LEUKOCYTESUR SMALL*   UROBILINOGEN 0.2   BILIRUBINUR Negative   BLOODU MODERATE*   GLUCOSEU Negative       Rad:  CT HIP LEFT WO CONTRAST   Final Result   Nondisplaced impacted subcapital left femoral neck fracture. XR FEMUR LEFT (MIN 2 VIEWS)   Final Result      1. No acute osseous abnormality. 2. Mild left hip arthritis. 3. Moderate vascular calcifications. XR PELVIS (1-2 VIEWS)   Final Result      Limited evaluation the pelvis as described.  There is Code  FEN: NPO  PPX: Lovenox SubQ 40mg  DISPO: [facility]    This patient has been staffed and discussed with Frances Shen MD.   -----------------------------  Agustin Ro MD, PGY-1  Internal Medicine    Addendum to Resident H& P/Progress note:  I have personally seen,examined and evaluated the patient.  I have reviewed the current history, physical findings, labs and assessment and plan and agree with note as documented by resident MD ( Nella Stanton)      Frances Shen MD, Jeffrey Dawson

## 2020-08-22 NOTE — PROGRESS NOTES
Patient A&Ox4. VSS, BP elevated, medicated per MAR. Neuro checks within normal limits, pt denies numbness or tingling. Patient reports pain in LLE with movement denies pain at rest, declines pain medication currently. Patient remains NPO, anticipating OR this morning. Chambers in place for retention. Patient instructed to call out for needs. Will continue to monitor.

## 2020-08-22 NOTE — PROGRESS NOTES
PACU Transfer Note    Vitals:    08/22/20 1115   BP: (!) 170/49   Pulse: 76   Resp: 17   Temp: 98.1 °F (36.7 °C)   SpO2: 100%   BP within 20% of baseline value. In: 40 [I.V.:40]  Out: 390 [Urine:360]    Pain assessment:  present - adequately treated, states discomfort is slight, cannot rate and declines need for any medication  Pain Level: 0    Report given to Receiving unit RN, Rosalie Roman, by phone. Patient is well known to her. Per patient's request, call placed to son Evonne Dotson to update. Aware of plan to move back to room.     8/22/2020 11:23 AM

## 2020-08-22 NOTE — PLAN OF CARE
Problem: Falls - Risk of:  Goal: Will remain free from falls  Description: Will remain free from falls  8/22/2020 0900 by Jeovanny Roper RN  Outcome: Ongoing     Problem: Pain:  Goal: Pain level will decrease  Description: Pain level will decrease  8/22/2020 0900 by Jeovanny Roper RN  Outcome: Ongoing     Problem: Skin Integrity:  Goal: Absence of new skin breakdown  Description: Absence of new skin breakdown  Outcome: Ongoing

## 2020-08-22 NOTE — ANESTHESIA POSTPROCEDURE EVALUATION
Department of Anesthesiology  Postprocedure Note    Patient: Sunil Wright  MRN: [id number]  YOB: 1925  Date of evaluation: 8/22/2020  Time:  10:27 AM     Procedure Summary     Date:  08/22/20 Room / Location:  [address]    Anesthesia Start:  8647 Anesthesia Stop:      Procedure:  LEFT HIP PERCUTANUOUS PINNING (Left ) Diagnosis:       [facility]      (LEFT HIP FRACTURE)    Surgeon:  Vernell Pyle DO Responsible Provider:  Marilee Rosenthal DO    Anesthesia Type:  general ASA Status:  4          Anesthesia Type: general    Maria Esther Phase I:      Maria Esther Phase II:      Last vitals: Reviewed and per EMR flowsheets.        Anesthesia Post Evaluation    Patient location during evaluation: PACU  Patient participation: complete - patient participated  Level of consciousness: awake  Pain score: 0  Airway patency: patent  Nausea & Vomiting: no nausea and no vomiting  Complications: no  Cardiovascular status: blood pressure returned to baseline  Respiratory status: acceptable  Hydration status: euvolemic

## 2020-08-23 LAB
ALBUMIN SERPL-MCNC: 3 G/DL (ref 3.4–5)
ANION GAP SERPL CALCULATED.3IONS-SCNC: 8 MMOL/L (ref 3–16)
BUN BLDV-MCNC: 31 MG/DL (ref 7–20)
CALCIUM SERPL-MCNC: 7.9 MG/DL (ref 8.3–10.6)
CHLORIDE BLD-SCNC: 100 MMOL/L (ref 99–110)
CO2: 25 MMOL/L (ref 21–32)
CREAT SERPL-MCNC: 1.5 MG/DL (ref 0.8–1.3)
GFR AFRICAN AMERICAN: 53
GFR NON-AFRICAN AMERICAN: 43
GLUCOSE BLD-MCNC: 110 MG/DL (ref 70–99)
PHOSPHORUS: 4.5 MG/DL (ref 2.5–4.9)
POTASSIUM SERPL-SCNC: 4 MMOL/L (ref 3.5–5.1)
SODIUM BLD-SCNC: 133 MMOL/L (ref 136–145)

## 2020-08-23 PROCEDURE — 6370000000 HC RX 637 (ALT 250 FOR IP): Performed by: ORTHOPAEDIC SURGERY

## 2020-08-23 PROCEDURE — 97166 OT EVAL MOD COMPLEX 45 MIN: CPT

## 2020-08-23 PROCEDURE — 80069 RENAL FUNCTION PANEL: CPT

## 2020-08-23 PROCEDURE — 36415 COLL VENOUS BLD VENIPUNCTURE: CPT

## 2020-08-23 PROCEDURE — 97530 THERAPEUTIC ACTIVITIES: CPT

## 2020-08-23 PROCEDURE — 99024 POSTOP FOLLOW-UP VISIT: CPT | Performed by: ORTHOPAEDIC SURGERY

## 2020-08-23 PROCEDURE — 6360000002 HC RX W HCPCS: Performed by: STUDENT IN AN ORGANIZED HEALTH CARE EDUCATION/TRAINING PROGRAM

## 2020-08-23 PROCEDURE — 2580000003 HC RX 258: Performed by: ORTHOPAEDIC SURGERY

## 2020-08-23 PROCEDURE — 6360000002 HC RX W HCPCS: Performed by: ORTHOPAEDIC SURGERY

## 2020-08-23 PROCEDURE — 51798 US URINE CAPACITY MEASURE: CPT

## 2020-08-23 PROCEDURE — 99232 SBSQ HOSP IP/OBS MODERATE 35: CPT | Performed by: HOSPITALIST

## 2020-08-23 PROCEDURE — 51701 INSERT BLADDER CATHETER: CPT

## 2020-08-23 PROCEDURE — 97162 PT EVAL MOD COMPLEX 30 MIN: CPT

## 2020-08-23 PROCEDURE — 1200000000 HC SEMI PRIVATE

## 2020-08-23 PROCEDURE — 2580000003 HC RX 258: Performed by: STUDENT IN AN ORGANIZED HEALTH CARE EDUCATION/TRAINING PROGRAM

## 2020-08-23 PROCEDURE — 97116 GAIT TRAINING THERAPY: CPT

## 2020-08-23 PROCEDURE — 97535 SELF CARE MNGMENT TRAINING: CPT

## 2020-08-23 RX ORDER — HEPARIN SODIUM 5000 [USP'U]/ML
5000 INJECTION, SOLUTION INTRAVENOUS; SUBCUTANEOUS EVERY 8 HOURS SCHEDULED
Status: DISCONTINUED | OUTPATIENT
Start: 2020-08-23 | End: 2020-08-24 | Stop reason: HOSPADM

## 2020-08-23 RX ORDER — SODIUM CHLORIDE 9 MG/ML
INJECTION, SOLUTION INTRAVENOUS CONTINUOUS
Status: DISPENSED | OUTPATIENT
Start: 2020-08-23 | End: 2020-08-23

## 2020-08-23 RX ORDER — SODIUM CHLORIDE 9 MG/ML
INJECTION, SOLUTION INTRAVENOUS CONTINUOUS
Status: DISCONTINUED | OUTPATIENT
Start: 2020-08-23 | End: 2020-08-23

## 2020-08-23 RX ADMIN — OXYCODONE HYDROCHLORIDE 10 MG: 5 TABLET ORAL at 09:47

## 2020-08-23 RX ADMIN — TAMSULOSIN HYDROCHLORIDE 0.4 MG: 0.4 CAPSULE ORAL at 21:11

## 2020-08-23 RX ADMIN — CEFAZOLIN 2 G: 10 INJECTION, POWDER, FOR SOLUTION INTRAVENOUS at 09:47

## 2020-08-23 RX ADMIN — HEPARIN SODIUM 5000 UNITS: 5000 INJECTION INTRAVENOUS; SUBCUTANEOUS at 21:11

## 2020-08-23 RX ADMIN — LEVOTHYROXINE SODIUM 50 MCG: 50 TABLET ORAL at 07:26

## 2020-08-23 RX ADMIN — CEFAZOLIN 2 G: 10 INJECTION, POWDER, FOR SOLUTION INTRAVENOUS at 01:19

## 2020-08-23 RX ADMIN — ACETAMINOPHEN 1000 MG: 500 TABLET ORAL at 07:26

## 2020-08-23 RX ADMIN — CLOPIDOGREL BISULFATE 75 MG: 75 TABLET ORAL at 09:46

## 2020-08-23 RX ADMIN — HEPARIN SODIUM 5000 UNITS: 5000 INJECTION INTRAVENOUS; SUBCUTANEOUS at 15:06

## 2020-08-23 RX ADMIN — OXYCODONE HYDROCHLORIDE 5 MG: 5 TABLET ORAL at 17:25

## 2020-08-23 RX ADMIN — ASPIRIN 81 MG: 81 TABLET, COATED ORAL at 09:46

## 2020-08-23 RX ADMIN — ACETAMINOPHEN 1000 MG: 500 TABLET ORAL at 21:11

## 2020-08-23 RX ADMIN — Medication 10 ML: at 21:31

## 2020-08-23 RX ADMIN — ATORVASTATIN CALCIUM 80 MG: 80 TABLET, FILM COATED ORAL at 09:46

## 2020-08-23 RX ADMIN — Medication 10 ML: at 09:47

## 2020-08-23 RX ADMIN — SODIUM CHLORIDE: 9 INJECTION, SOLUTION INTRAVENOUS at 09:46

## 2020-08-23 ASSESSMENT — PAIN DESCRIPTION - ONSET
ONSET: ON-GOING

## 2020-08-23 ASSESSMENT — PAIN DESCRIPTION - ORIENTATION
ORIENTATION: LEFT

## 2020-08-23 ASSESSMENT — PAIN DESCRIPTION - LOCATION
LOCATION: HIP

## 2020-08-23 ASSESSMENT — PAIN DESCRIPTION - FREQUENCY
FREQUENCY: CONTINUOUS

## 2020-08-23 ASSESSMENT — PAIN DESCRIPTION - PAIN TYPE
TYPE: SURGICAL PAIN

## 2020-08-23 ASSESSMENT — PAIN SCALES - GENERAL
PAINLEVEL_OUTOF10: 4
PAINLEVEL_OUTOF10: 3
PAINLEVEL_OUTOF10: 7
PAINLEVEL_OUTOF10: 7
PAINLEVEL_OUTOF10: 6
PAINLEVEL_OUTOF10: 3

## 2020-08-23 ASSESSMENT — PAIN DESCRIPTION - PROGRESSION
CLINICAL_PROGRESSION: NOT CHANGED
CLINICAL_PROGRESSION: GRADUALLY WORSENING
CLINICAL_PROGRESSION: NOT CHANGED

## 2020-08-23 ASSESSMENT — PAIN DESCRIPTION - DESCRIPTORS
DESCRIPTORS: ACHING

## 2020-08-23 ASSESSMENT — PAIN - FUNCTIONAL ASSESSMENT: PAIN_FUNCTIONAL_ASSESSMENT: PREVENTS OR INTERFERES SOME ACTIVE ACTIVITIES AND ADLS

## 2020-08-23 NOTE — PROGRESS NOTES
14   Wt 186 lb 11.2 oz (84.7 kg)   SpO2 92%   BMI 23.34 kg/m²     I/O:      Intake/Output Summary (Last 24 hours) at 8/23/2020 1225  Last data filed at 8/22/2020 2100  Gross per 24 hour   Intake --   Output 400 ml   Net -400 ml     No intake/output data recorded. I/O last 3 completed shifts: In: 36 [I.V.:40]  Out: 790 [Urine:760; Blood:30]    Physical Examination:   ? General appearance: Alert, appears stated age and cooperative. ? Skin: Skin is dry. ? HEENT: Head: Normocephalic. ? Eye: SARATH. ? Neck: No tenderness/mass/nodules. ? Lungs: Clear to auscultation bilaterally. ? Heart: No murmur, click, rub or gallop. ? MSK: L hip with dressing CDI. LLE palpable pedal pulses  ? Abdomen: soft, non-tender; bowel sounds normal.  ? Lymphatic: No significant lymph node enlargement papable. ? Neurologic: Alert, oriented, thought content appropriate. DATA:       Labs:  CBC:   Recent Labs     08/20/20  1412 08/21/20  0538   WBC 12.4* 10.2   HGB 12.8* 11.6*   HCT 39.0* 33.6*   * 109*       BMP:   Recent Labs     08/21/20  0538 08/22/20  0502 08/23/20  0439   * 135* 133*   K 4.4 4.2 4.0    100 100   CO2 24 27 25   BUN 16 23* 31*   CREATININE 1.2 1.3 1.5*   GLUCOSE 112* 90 110*     LFT's: No results for input(s): AST, ALT, ALB, BILITOT, ALKPHOS in the last 72 hours. Troponin:   Recent Labs     08/20/20  1412   TROPONINI 0.02*     BNP: No results for input(s): BNP in the last 72 hours. ABGs: No results for input(s): PHART, KTW5DRM, PO2ART in the last 72 hours. INR: No results for input(s): INR in the last 72 hours. Lipids: No results for input(s): CHOL, HDL in the last 72 hours.     Invalid input(s): LDLCALCU    U/A:  Recent Labs     08/20/20  1452   COLORU Yellow   PHUR 7.5   WBCUA 10-20*   RBCUA 0-2   BACTERIA 1+*   CLARITYU Clear   SPECGRAV 1.010   LEUKOCYTESUR SMALL*   UROBILINOGEN 0.2   BILIRUBINUR Negative   BLOODU MODERATE*   GLUCOSEU Negative       Rad:  XR HIP LEFT (2-3 VIEWS)   Final Result      Intraoperative ORIF proximal left femur      FLUORO FOR SURGICAL PROCEDURES   Final Result      Intraoperative ORIF proximal left femur      CT HIP LEFT WO CONTRAST   Final Result   Nondisplaced impacted subcapital left femoral neck fracture. XR FEMUR LEFT (MIN 2 VIEWS)   Final Result      1. No acute osseous abnormality. 2. Mild left hip arthritis. 3. Moderate vascular calcifications. XR PELVIS (1-2 VIEWS)   Final Result      Limited evaluation the pelvis as described. There is possible mild impaction fracture left femoral neck. Dedicated images recommended. XR CHEST PORTABLE   Final Result      Left lower lung opacity obscuring the left hemidiaphragm may relate to atelectasis and/or consolidation. CT CERVICAL SPINE WO CONTRAST   Final Result   1. No evidence of an acute fracture or dislocation in the cervical spine. 2. Severe spondylosis contributing to multiple levels of severe foraminal narrowing and mild to moderate central canal stenosis. 3. 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 4. Profoundly dilated esophagus fluid and debris within the lumen compatible with the patient's history of achalasia. CT HEAD WO CONTRAST   Final Result      No acute hemorrhage        EKG: w/o ischemic changes      ASSESSMENT AND PLAN:   Giana Mathias is a 80 y.o. male, who was admitted after a fall at home.     Hip Fracture 2/2 questionable mechanical fall s/p closed reduction percutaneous screw fixation  Imaging as above. - Orthopedics: closed reduction percutaneous screw fixation  - pain management     Fall   No LOC, unclear etiology most likely mechanical. No prodrome concerning for possible cardiac etiology.   - Orthostatics deferred due to patient safety with fracture  - Cardiology consulted.  Recs appreciated  - PT/OT when cleared by ortho  - telemetry    MULU  Baseline Cre 1.1 and today 1.5. Likely prerenal.   - started on 75mL NS for 13 hrs    HFpEF  No EF on file, but appeared normal last echo (05/20/2016). Grade I diastolic dysfunction. Echo (08/20) was difficult to assess due to poor visualization. EF:50%  - strict I/O  - Daily weights as tolerated     Pain management  - Morphine 2mg [id number] PRN  - will adjust as appropriate for patient comfort     HTN  - discontinue home lisinopril due to MULU     PT/OT Eval Status: Pending clearance from Ortho        Code Status:Full Code  FEN: NPO  PPX: Heparin SubQ  DISPO: GMF    This patient has been staffed and discussed with Israel Scott MD.   -----------------------------  Arti La MD, PGY-3  Internal Medicine  08/23/20  12:25 PM     Addendum to Resident H& P/Progress note:  I have personally seen,examined and evaluated the patient.  I have reviewed the current history, physical findings, labs and assessment and plan and agree with note as documented by resident MD ( )  + mild hyponatremia    Israel Scott MD, Gretchen Holder

## 2020-08-23 NOTE — PROGRESS NOTES
Physical Therapy    [facility]  Initial Assessment and treatment    NAME: Christina Claros  : 1925  MRN: [id number]    Date of Service: 2020    Discharge Recommendations:    Christina Claros scored a 7/24 on the AM-PAC short mobility form. Current research shows that an AM-PAC score of 17 or less is typically not associated with a discharge to the patient's home setting. Based on the patient's AM-PAC score and their current functional mobility deficits, it is recommended that the patient have 3-5 sessions per week of Physical Therapy at d/c to increase the patient's independence. Please see assessment section for further patient specific details. PT Equipment Recommendations  Equipment Needed: No(defer to next level of care)    Assessment   Body structures, Functions, Activity limitations: Decreased functional mobility ; Decreased safe awareness;Decreased balance;Decreased ROM; Decreased endurance; Increased pain;Decreased strength  Assessment: Patient tolerated session fair with mobility being limited due to overall decreased acitivity tolerance and increased left hip pain. Patient able to transfer to EOB and over to bedside chair with mod/max assist x 2. He demonstrates unsteady gait with effortful movements, requiring increased time to complete all tasks. Patient nauseated throughout session with small amounts of emesis x 3; RN aware. Patient typically independent with ADLs, living with his sons. He is currently functioning below baseline level. Recommend continued skilled PT upon discharge. Will follow while in acute care setting to improve functional mobility. Treatment Diagnosis: impaired mobility associated with left hip fracture  Prognosis: Good  Decision Making: Medium Complexity  Patient Education: Educated on role of PT, safety with mobility, transfer/gait training, d/c recommendations; patient verbalized understanding.   REQUIRES PT FOLLOW UP: Orientation Status: Impaired  Orientation Level: Oriented to situation;Oriented to person;Oriented to place; Disoriented to time  Social/Functional History  Social/Functional History  Lives With: Son(lives with two sons)  Type of Home: (condo)  Home Layout: One level  Home Access: Level entry  Bathroom Shower/Tub: Walk-in shower  Bathroom Toilet: Handicap height  Bathroom Equipment: Grab bars in shower, Shower chair, Hand-held shower, Grab bars around toilet  Home Equipment: Rolling walker, Hospital bed, Lift chair  ADL Assistance: [address]: Needs assistance(sons perform, has occasional outside help with cleaning)  Homemaking Responsibilities: No  Ambulation Assistance: Independent(with FWW)  Transfer Assistance: Independent  Active : No(sons drive to medical appointments)  Additional Comments: patient denies history of falls aside from fall leading to hospital admission  Cognition   Cognition  Overall Cognitive Status: WFL    Objective          AROM RLE (degrees)  RLE AROM: WFL  AROM LLE (degrees)  LLE AROM : Exceptions  LLE General AROM: grossly limited in hip and knee due to pain/surgery, ankle WFL  Strength RLE  Strength RLE: WFL  Strength LLE  Strength LLE: Exception  Comment: grossly limited in hip and knee due to pain/surgery, ankle WFL        Bed mobility  Supine to Sit: 2 Person assistance;Dependent/Total(max assist x 2, head of bed elevated, increased time to complete tasks, assist with LEs and assist with trunk)  Scooting: Contact guard assistance(to EOB)  Comment: patient sitting up in bedside chair at end of session  Transfers  Sit to Stand: 2 Person Assistance;Dependent/Total(mod assist x 2 from EOB)  Stand to sit: 2 Person Assistance;Dependent/Total(mod assist x 2 to bedside chair)  Bed to Chair: 2 Person Assistance;Dependent/Total(mod assist x 2 to take steps with FWW)  Ambulation  Ambulation?: Yes  Ambulation 1  Surface: level tile  Device: [address]: 2 Person assistance;Dependent/Total(mod assist x 2)  Quality of Gait: gait unsteady, movement very effortful with significantly increased time to complete tasks, step by step verbal cues for sequencing, shuffling steps with difficulty weight bearing on LLE  Distance: 3-4 steps from EOB to bedside chair  Stairs/Curb  Stairs?: No     Balance  Sitting - Static: Fair(CGA to sit EOB)  Standing - Static: Poor(mod assist x 1 with UE support)  Standing - Dynamic: Poor(mod assist x 2 to take steps with FWW)  Comments: patient assisted to change gown, clean up with wipes once sitting in bedside chair        Plan   Plan  Times per week: 7  Current Treatment Recommendations: Strengthening, Transfer Training, Endurance Training, Patient/Caregiver Education & Training, Balance Training, Gait Training, Functional Mobility Training, Safety Education & Training  Safety Devices  Type of devices: Left in chair, Chair alarm in place, Call light within reach, Nurse notified, Gait belt(recommend return to bed via assist x 2 and isaac issa; RN aware)    OutComes Score                                                  AM-PAC Score  AM-PAC Inpatient Mobility Raw Score : 7 (08/23/20 1250)  AM-PAC Inpatient T-Scale Score : 26.42 (08/23/20 1250)  Mobility Inpatient CMS 0-100% Score: 92.36 (08/23/20 1250)  Mobility Inpatient CMS G-Code Modifier : CM (08/23/20 1250)          Goals  Short term goals  Time Frame for Short term goals: discharge  Short term goal 1: patient will perform bed mobility with moderate assistance  Short term goal 2: patient will perform transfers sit<>stand with moderate assistance  Short term goal 3: patient will perform transfers bed<>chair with moderate assistance  Short term goal 4: patient will ambulate 8' with FWW and moderate assistance  Patient Goals   Patient goals : none stated       Therapy Time   Individual Concurrent Group Co-treatment   Time In 1050         Time Out 1130         Minutes 40         Timed Code Treatment Minutes: 25 Minutes    Timed Code Treatment Minutes:  25 Minutes    Total Treatment Minutes:    25 minutes treatment + 15 minutes evaluation = 40 total treatment minutes  If patient discharges prior to next session this note will serve as a discharge summary. Please see below for the latest assessment towards goals.    Sergio COTA [address]
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
## 2020-08-23 NOTE — PROGRESS NOTES
Ortho Progress Note    Subjective:  Patient sitting up in bed comfortably; no complaints    Objective:  AAOx3  Left hip dressing clean, dry, intact  Minimal left thigh swelling  LLE NV intact L1-S1 with gross motor/sensory function intact  +LLE palpable pedal pulses  Left thigh/leg compartments soft/compressible    A/P: POD#1 s/p CRPP left hip  1. WBAT  2. PT/OT/OOB w/ assistance  3. Pain control  4. DVT prophylaxis  5. Medical management  6.  D/C planning

## 2020-08-23 NOTE — PROGRESS NOTES
Patient is AAOx4. VSS. Patient is still requiring 1L of oxygen at this time. Patient has a clean dry and intact dressing to hip. Patient has gonzales in place. Patient has not gotten out of bed yet but willing to try in the AM. Patient is very hard of hearing. Patient complains of pain, patient medicated per mar. Patient is resting in bed at this time with all fall precautions in place.

## 2020-08-23 NOTE — PROGRESS NOTES
Patient A&Ox4, VSS. Neuro checks within normal limits. Surgical dressing with scant drainage. Pain well managed with oral medications. Patient up to the chair with therapy, tolerated poorly, requiring assist of 2. Patient has yet to void since gonzales removal this morning. Patient anticipating discharge home vs. SNF once medically stable. Will continue to monitor.

## 2020-08-24 VITALS
OXYGEN SATURATION: 93 % | TEMPERATURE: 98.8 F | RESPIRATION RATE: 16 BRPM | HEART RATE: 80 BPM | DIASTOLIC BLOOD PRESSURE: 74 MMHG | BODY MASS INDEX: 23.34 KG/M2 | SYSTOLIC BLOOD PRESSURE: 155 MMHG | WEIGHT: 186.7 LBS

## 2020-08-24 LAB
ALBUMIN SERPL-MCNC: 3.3 G/DL (ref 3.4–5)
ANION GAP SERPL CALCULATED.3IONS-SCNC: 8 MMOL/L (ref 3–16)
BUN BLDV-MCNC: 27 MG/DL (ref 7–20)
CALCIUM SERPL-MCNC: 8.3 MG/DL (ref 8.3–10.6)
CHLORIDE BLD-SCNC: 102 MMOL/L (ref 99–110)
CO2: 27 MMOL/L (ref 21–32)
CREAT SERPL-MCNC: 1.3 MG/DL (ref 0.8–1.3)
GFR AFRICAN AMERICAN: >60
GFR NON-AFRICAN AMERICAN: 51
GLUCOSE BLD-MCNC: 91 MG/DL (ref 70–99)
PHOSPHORUS: 3.8 MG/DL (ref 2.5–4.9)
POTASSIUM SERPL-SCNC: 3.9 MMOL/L (ref 3.5–5.1)
SODIUM BLD-SCNC: 137 MMOL/L (ref 136–145)

## 2020-08-24 PROCEDURE — 6370000000 HC RX 637 (ALT 250 FOR IP): Performed by: ORTHOPAEDIC SURGERY

## 2020-08-24 PROCEDURE — 36415 COLL VENOUS BLD VENIPUNCTURE: CPT

## 2020-08-24 PROCEDURE — 51798 US URINE CAPACITY MEASURE: CPT

## 2020-08-24 PROCEDURE — 2580000003 HC RX 258: Performed by: ORTHOPAEDIC SURGERY

## 2020-08-24 PROCEDURE — 99024 POSTOP FOLLOW-UP VISIT: CPT | Performed by: ORTHOPAEDIC SURGERY

## 2020-08-24 PROCEDURE — 6360000002 HC RX W HCPCS: Performed by: ORTHOPAEDIC SURGERY

## 2020-08-24 PROCEDURE — 6360000002 HC RX W HCPCS: Performed by: STUDENT IN AN ORGANIZED HEALTH CARE EDUCATION/TRAINING PROGRAM

## 2020-08-24 PROCEDURE — 80069 RENAL FUNCTION PANEL: CPT

## 2020-08-24 PROCEDURE — 99238 HOSP IP/OBS DSCHRG MGMT 30/<: CPT | Performed by: HOSPITALIST

## 2020-08-24 RX ORDER — TAMSULOSIN HYDROCHLORIDE 0.4 MG/1
0.8 CAPSULE ORAL NIGHTLY
Qty: 30 CAPSULE | Refills: 3 | Status: ON HOLD | OUTPATIENT
Start: 2020-08-24 | End: 2020-08-28 | Stop reason: CLARIF

## 2020-08-24 RX ORDER — HEPARIN SODIUM 5000 [USP'U]/ML
5000 INJECTION, SOLUTION INTRAVENOUS; SUBCUTANEOUS EVERY 8 HOURS SCHEDULED
Qty: 84 ML | Refills: 0 | Status: SHIPPED | OUTPATIENT
Start: 2020-08-24 | End: 2020-09-21

## 2020-08-24 RX ORDER — TRAMADOL HYDROCHLORIDE 50 MG/1
50 TABLET ORAL EVERY 6 HOURS PRN
Qty: 12 TABLET | Refills: 0 | Status: SHIPPED | OUTPATIENT
Start: 2020-08-24 | End: 2020-08-27

## 2020-08-24 RX ADMIN — HEPARIN SODIUM 5000 UNITS: 5000 INJECTION INTRAVENOUS; SUBCUTANEOUS at 05:47

## 2020-08-24 RX ADMIN — LEVOTHYROXINE SODIUM 50 MCG: 50 TABLET ORAL at 05:47

## 2020-08-24 RX ADMIN — CLOPIDOGREL BISULFATE 75 MG: 75 TABLET ORAL at 10:30

## 2020-08-24 RX ADMIN — PROMETHAZINE HYDROCHLORIDE 12.5 MG: 12.5 TABLET ORAL at 10:05

## 2020-08-24 RX ADMIN — ACETAMINOPHEN 1000 MG: 500 TABLET ORAL at 14:04

## 2020-08-24 RX ADMIN — HEPARIN SODIUM 5000 UNITS: 5000 INJECTION INTRAVENOUS; SUBCUTANEOUS at 14:05

## 2020-08-24 RX ADMIN — ACETAMINOPHEN 1000 MG: 500 TABLET ORAL at 05:47

## 2020-08-24 RX ADMIN — ATORVASTATIN CALCIUM 80 MG: 80 TABLET, FILM COATED ORAL at 10:30

## 2020-08-24 RX ADMIN — ASPIRIN 81 MG: 81 TABLET, COATED ORAL at 10:30

## 2020-08-24 RX ADMIN — ONDANSETRON 4 MG: 2 INJECTION INTRAMUSCULAR; INTRAVENOUS at 10:19

## 2020-08-24 RX ADMIN — Medication 10 ML: at 10:07

## 2020-08-24 ASSESSMENT — PAIN - FUNCTIONAL ASSESSMENT: PAIN_FUNCTIONAL_ASSESSMENT: PREVENTS OR INTERFERES SOME ACTIVE ACTIVITIES AND ADLS

## 2020-08-24 ASSESSMENT — PAIN DESCRIPTION - ONSET: ONSET: ON-GOING

## 2020-08-24 ASSESSMENT — PAIN DESCRIPTION - LOCATION: LOCATION: HIP

## 2020-08-24 ASSESSMENT — PAIN DESCRIPTION - ORIENTATION: ORIENTATION: LEFT

## 2020-08-24 ASSESSMENT — PAIN DESCRIPTION - PAIN TYPE: TYPE: SURGICAL PAIN

## 2020-08-24 ASSESSMENT — PAIN SCALES - GENERAL
PAINLEVEL_OUTOF10: 3
PAINLEVEL_OUTOF10: 8
PAINLEVEL_OUTOF10: 0

## 2020-08-24 ASSESSMENT — PAIN DESCRIPTION - DESCRIPTORS: DESCRIPTORS: ACHING

## 2020-08-24 ASSESSMENT — PAIN DESCRIPTION - FREQUENCY: FREQUENCY: CONTINUOUS

## 2020-08-24 ASSESSMENT — PAIN DESCRIPTION - PROGRESSION: CLINICAL_PROGRESSION: GRADUALLY WORSENING

## 2020-08-24 NOTE — DISCHARGE SUMMARY
INTERNAL MEDICINE DEPARTMENT AT [address] [place] [address]  DISCHARGE SUMMARY    Patient ID: Jose Raul Pierre                                             Discharge Date: 8/24/2020   Patient's PCP: Duran Mayes MD                                          Discharge Physician: Lizette Savage MD  Admit Date: 8/20/2020   Admitting Physician: Gina Barrera MD    DISCHARGE DIAGNOSES:  1- Hip Fracture 2/2 questionable mechanical fall s/p closed reduction percutaneous screw fixation  2- Mechanical fall   3- MULU  4- HFpEF  5- Pain management  6- HTN    Hospital Course:    Sheldon Ch is a 81 yo M with  A PMHx of CAD s/p stents, HLD, HTN who presented after a fall at home. Patient stated that he was at home in his normal state of health. He got up and went to the counter and was going back to his chair when he fell to the ground. He is a poor historian and is unsure why he fell. He denied any dizziness, LOC, confusion after the fall. He fell backwards stating it felt like he was a weight and just fell down. This is the 2nd fall over the last month that the patient reported. Each time denying any change in consciousness, trips, lightheadedness, palpitations, nausea, vomiting. No new change in medications. Patient has leg swelling that is always present. He takes a water pill that helps some but they are always swollen.      In the ED patient had a hip xray and CT that showed fracture of the L femoral Neck. Orthopedics was consulted and they suggested admittance for fixation of the hip when cleared by cardiology. Patient had an elevated BNP but with no reference range. He was also noted to have blunting of the costophrenic angle on chest xray concerning for fluid of pneumonia. Patient was given one dose of Abx in the ED. Cardiology approved the surgery after echo showed EF of 50%. Left hip percutaneous pinning was done on 08/22. OT and PT score were 15/24 and 7/24, respectively. Patient is stable to discharge to [facility]. Patient is currently on Plavix and Asparin. He will be continued on heparin SubQ for 4 weeks for DVT PPX. Physical Exam:  BP (!) 161/80   Pulse 76   Temp 99.2 °F (37.3 °C) (Oral)   Resp 16   Wt 186 lb 11.2 oz (84.7 kg)   SpO2 91%   BMI 23.34 kg/m²   · General appearance: Alert, appears stated age and cooperative. · Skin: Skin is dry. · HEENT: Head: Normocephalic. · Eye: SARATH. · Neck: No tenderness/mass/nodules. · Lungs: Clear to auscultation bilaterally. · Heart: No murmur, click, rub or gallop. · MSK: L hip with dressing CDI. LLE palpable pedal pulses  · Abdomen: soft, non-tender; bowel sounds normal.  · Lymphatic: No significant lymph node enlargement papable. · Neurologic: Alert, oriented, thought content appropriate. Consults: cardiology and orthopedic surgery  Significant Diagnostic Studies:   XR HIP LEFT (2-3 VIEWS)   Final Result      Intraoperative ORIF proximal left femur      FLUORO FOR SURGICAL PROCEDURES   Final Result      Intraoperative ORIF proximal left femur      CT HIP LEFT WO CONTRAST   Final Result   Nondisplaced impacted subcapital left femoral neck fracture. XR FEMUR LEFT (MIN 2 VIEWS)   Final Result      1. No acute osseous abnormality. 2. Mild left hip arthritis. 3. Moderate vascular calcifications. XR PELVIS (1-2 VIEWS)   Final Result      Limited evaluation the pelvis as described. There is possible mild impaction fracture left femoral neck. Dedicated images recommended. XR CHEST PORTABLE   Final Result      Left lower lung opacity obscuring the left hemidiaphragm may relate to atelectasis and/or consolidation. CT CERVICAL SPINE WO CONTRAST   Final Result   1. No evidence of an acute fracture or dislocation in the cervical spine. 2. Severe spondylosis contributing to multiple levels of severe foraminal narrowing and mild to moderate central canal stenosis.    3. Marked pannus formation behind the odontoid process contributing to moderate canal current history, physical findings, labs and assessment and plan and agree with note as documented by resident MD ( Meli Dodd)  The patient will be transferred to [facility].       Julita Talbert MD, FACP

## 2020-08-24 NOTE — PROGRESS NOTES
Ortho Progress Note    Subjective:  Patient lying in bed sleeping comfortably    Objective:  AAOx3  Left hip dressing clean/dry  Minimal left thigh swelling  LLE NV intact L1-S1 with gross motor/sensory function intact  +LLE palpable pedal pulses  Left thigh/leg compartments soft/compressible    A/P: POD#2 s/p CRPP left hip  1. WBAT  2. PT/OT/OOB w/ assistance  3. Pain control  4. DVT prophylaxis; recommend lovenox or subq heparin x4 weeks  5. Medical mangement  6.  D/C planning

## 2020-08-24 NOTE — PROGRESS NOTES
Patient is AAOx4. VSS. Patient is getting up x2. Patient has a clean dry and intact. Patient was unable to void this shift after gonzales being pulled this AM. Straight cathed patient per order. Patient is resting in bed at this time with all fall precautions in place. Will continue to monitor.

## 2020-08-24 NOTE — DISCHARGE INSTR - COC
Continuity of Care Form    Patient Name: Iván Mcgraw   :  1925  MRN:  [id number]    Admit date:  2020  Discharge date:  2020    Code Status Order: Full Code   Advance Directives:   Advance Care Flowsheet Documentation     Date/Time Healthcare Directive Type of Healthcare Directive Copy in [address] Agent's Name Healthcare Agent's Phone Number    20 [phone]  No, patient does not have an advance directive for healthcare treatment -- -- -- -- --          Admitting Physician:  Mohsen Silverman MD  PCP: Rochelle Light MD    Discharging Nurse: [facility] Unit/Room#: 0383/5422-30  Discharging Unit Phone Number: [phone]    Emergency Contact:   Extended Emergency Contact Information  Primary Emergency Contact: Ghulam Cueto [facility] [address]  Phone: [phone]  Mobile Phone: [phone]  Relation: Child  Secondary Emergency Contact: Sukhdev Ibanez  Address: [address] Phone: [phone]  Relation: Spouse    Past Surgical History:  Past Surgical History:   Procedure Laterality Date    APPENDECTOMY      CATARACT REMOVAL Bilateral     CORONARY ANGIOPLASTY WITH STENT PLACEMENT      [address]      inguinal    HIP SURGERY Left 2020    LEFT HIP PERCUTANUOUS PINNING performed by Stephane Kim DO at [address] Left     laser repair       Immunization History:   Immunization History   Administered Date(s) Administered    DT (pediatric) 2000    Influenza 10/09/2014    Influenza Virus Vaccine 10/12/2011    Influenza, High Dose (Fluzone 65 yrs and older) 10/14/2013, 10/16/2018    Influenza, Triv, inactivated, subunit, adjuvanted, IM (Fluad 65 yrs and older) 10/22/2019    Pneumococcal Polysaccharide ([id number]) 2006    Tdap (Boostrix, Adacel) 2016       Active Problems:  Patient Active Problem List   Diagnosis Code    Hypertension I10    Hyperlipidemia E78.5    CAD (coronary artery disease) I25.10    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 symptoms N40.1, N13.8    Arthritis M19.90    Vitamin D deficiency E55.9    Anemia D64.9    Hypothyroid E03.9    Benign nodular prostatic hyperplasia with lower urinary tract symptoms N40.1    Essential hypertension I10    Vertigo R42    Ataxia R27.0    Bilateral carotid artery disease (HCC) I73.9    Dyspepsia and disorder of function of stomach K31.9, R10.13    Rash and nonspecific skin eruption R21    Closed left hip fracture, initial encounter [facility]) S72.002A    Fall W19. XXXA    Chronic diastolic heart failure (HCC) I50.32    Bilateral lower extremity edema R60.0    MULU (acute kidney injury) ([facility] [address] N17.9    Hyponatremia E87.1       Isolation/Infection:   Isolation          No Isolation        Patient Infection Status     Infection Onset Added Last Indicated Last Indicated By Review Planned Expiration Resolved Resolved By    None active    Resolved    COVID-19 Rule Out 08/21/20 08/21/20 08/21/20 COVID-19 (Ordered)   08/21/20 Rule-Out Test Resulted          Nurse Assessment:  Last Vital Signs: BP (!) 161/80   Pulse 76   Temp 99.2 °F (37.3 °C) (Oral)   Resp 16   Wt 186 lb 11.2 oz (84.7 kg)   SpO2 91%   BMI 23.34 kg/m²     Last documented pain score (0-10 scale): Pain Level: 3  Last Weight:   Wt Readings from Last 1 Encounters:   08/22/20 186 lb 11.2 oz (84.7 kg)     Mental Status:  oriented and alert    IV Access:  - None    Nursing Mobility/ADLs:  Walking   Dependent  Transfer  Dependent  Bathing  Assisted  Dressing  Assisted  Toileting  Assisted  Feeding  Independent  Med Admin  Assisted  Med Delivery   whole    Wound Care Documentation and Therapy:        Elimination:  Continence:   · Bowel:  Yes  · Bladder: Yes  Urinary Catheter: No   Colostomy/Ileostomy/Ileal Conduit: No       Date of Last BM: ***    Intake/Output Summary (Last 24 hours) at 8/24/2020 1214  Last data filed at 8/24/2020 1007  Gross per 24 hour   Intake 130 ml   Output 500 ml   Net -370 ml     I/O last 3 completed shifts: In: 240 [P.O.:240]  Out: 450 [Urine:450]    Safety Concerns:     History of Falls (last 30 days)    Impairments/Disabilities:      Hearing    Nutrition Therapy:  Current Nutrition Therapy:   - Oral Diet:  General and Kosher    Routes of Feeding: Oral  Liquids: Thin Liquids  Daily Fluid Restriction: no  Last Modified Barium Swallow with Video (Video Swallowing Test): not done    Treatments at the Time of Hospital Discharge:   Respiratory Treatments: RA  Oxygen Therapy:  is not on home oxygen therapy.   Ventilator:    - No ventilator support    Rehab Therapies: Physical Therapy and Occupational Therapy  Weight Bearing Status/Restrictions: No weight bearing restirctions  Other Medical Equipment (for information only, NOT a DME order):  walker  Other Treatments: NA    Patient's personal belongings (please select all that are sent with patient):  son may be picking up hearing aides from the hospital    RN SIGNATURE:  Electronically signed by Alonso Newell RN on 8/24/20 at 12:18 PM EDT    CASE MANAGEMENT/SOCIAL WORK SECTION    Inpatient Status Date: 08/20/2020    Readmission Risk Assessment Score:  Readmission Risk              Risk of Unplanned Readmission:        16           Discharging to Facility/ Agency   · Name: [facility]   · Address:  · Phone:report- [phone]  · Fax:[phone]    Dialysis Facility (if applicable)   · Name:  · Address:  · Dialysis Schedule:  · Phone:  · Fax:    / signature: Electronically signed by Alonso Newell RN on 8/24/20 at 12:18 PM EDT    PHYSICIAN SECTION    Prognosis: Good    Condition at Discharge: Stable    Rehab Potential (if transferring to Rehab): Good    Recommended Labs or Other Treatments After Discharge:   PT/OT  Hip fracture status post closed reduction percutaneous screw fixation DVT prophylaxis: pt to continue on heparin SubQ for 4 weeks    Physician Certification: I certify the above information and transfer of Erik Thompson  is necessary for the continuing treatment of the diagnosis listed and that he requires [facility] for less 30 days.      Update Admission H&P: No change in H&P    PHYSICIAN SIGNATURE:  Barbara Fitzgerald MD, [address]

## 2020-08-24 NOTE — PROGRESS NOTES
Patient made aware of discharge to [facility]. Son at bedside and made aware of discharge as well. IVs removed. Belongings packed. REINA completed. AVS and MAR report printed and placed in packet to be given to [facility]. Transport at bedside to drive patient to SNF. Patient to SNF with all belongings.

## 2020-08-24 NOTE — CARE COORDINATION
the hospital at discharge, or pharmacy can deliver to the bedside if staff is available. (payment due at time of pick-up or delivery - cash, check, or card accepted)     Able to afford home medications/ co-pay costs: Yes    ADLS:  Current PT AM-PAC Score: 7 /24  Current OT AM-PAC Score: 15 /24      DISCHARGE Disposition: Jignesh Correa (S[facility] Phone: [phone] Fax: [phone]    LOC at discharge: Skilled  [address] Completed: Yes    Notification completed in HENS/PAS?:  Yes : CM has completed HENS online through secure website for SNF admission [facility]. Document ID #: [id number]    IMM Completed:   Yes, Case management has presented and reviewed IMM letter #2 to the patient and/or family/ POA. Patient and/or family/POA verbalized understanding of their medicare rights and appeal process if needed. Patient and/or family/POA has signed, initialed and placed today's date (08/24/2020) and time ([phone]) on IMM letter #2 on the the appropriate lines. Patient and/or family/POA, copy of letter offered and they are aware that this original copy of IMM letter #2 is available prior to discharge from the paper chart on the unit. Electronic documentation has been entered into epic for IMM letter #2 and original paper copy has been added to the paper chart at the nurses station.      Transportation:  Transportation PLAN for discharge: EMS transportation   Mode of Transport: Ambulance stretcher - S  Reason for medical transport: Severe muscular weakness and de-conditioned state due to left hip fracture and requires ambulance transport due to assist x 2 for transfers  Name of [address]: [address]  Phone: [phone]  Time of Transport: [address] form completed: Yes      The Plan for Transition of Care is related to the following treatment goals of Closed left hip fracture, [facility] [S72.002A]  Closed left hip fracture, initial encounter [facility] Marisol Patel    The Patient

## 2020-08-26 ENCOUNTER — OUTSIDE SERVICES (OUTPATIENT)
Dept: INTERNAL MEDICINE CLINIC | Age: 85
End: 2020-08-26
Payer: MEDICARE

## 2020-08-26 VITALS
SYSTOLIC BLOOD PRESSURE: 133 MMHG | OXYGEN SATURATION: 93 % | BODY MASS INDEX: 22.37 KG/M2 | WEIGHT: 179 LBS | TEMPERATURE: 99.3 F | DIASTOLIC BLOOD PRESSURE: 78 MMHG | RESPIRATION RATE: 16 BRPM | HEART RATE: 115 BPM

## 2020-08-26 PROBLEM — R13.12 OROPHARYNGEAL DYSPHAGIA: Status: ACTIVE | Noted: 2020-08-26

## 2020-08-26 PROBLEM — R29.6 MULTIPLE FALLS: Status: ACTIVE | Noted: 2020-08-26

## 2020-08-26 PROBLEM — R33.9 URINARY RETENTION: Status: ACTIVE | Noted: 2020-08-26

## 2020-08-26 PROBLEM — J96.01 ACUTE RESPIRATORY FAILURE WITH HYPOXIA (HCC): Status: ACTIVE | Noted: 2020-08-26

## 2020-08-26 PROBLEM — J69.0 ASPIRATION PNEUMONITIS (HCC): Status: ACTIVE | Noted: 2020-08-26

## 2020-08-26 PROCEDURE — 99306 1ST NF CARE HIGH MDM 50: CPT | Performed by: INTERNAL MEDICINE

## 2020-08-26 NOTE — PROGRESS NOTES
[facility]) Physicians  Internal Medicine  Patient Encounter  Anthony Connor D.O., [facility]          Chief Complaint   Patient presents with    Follow-Up from Hospital    Hip Injury    Coronary Artery Disease    Hypertension    Congestive Heart Failure       HPI-- 80 y.o. male seen today [facility]. after a fall at home. He was admitted 8/20/2020-8/24/2020. Pt was diagnosed with a left femoral neck fracture. The pt has little recollection of events that led to fall. He lost his footing and fell. He landed on top of a walker. He states he struck his left arm and landed on the left hip. According to his son the patient was still quite lucid and knew he had to go to the hospital.  Patient states he was walking from the dining room into the living room X-ray in the ER was inconclusive, but CT scan of the hip did show a fracture. Given his H/O CAD and Chronic diastolic CHF, he was seen by cardiology for clearance. An echo was obtained which showed an EF of 50%. On 8/22/2020 he underwent a closed reduction percutaneous screw fixation. Dr. Jam Hines performed the procedure without complication. Treatment right now the patient had no postoperative complications and was eventually transferred to [facility] for rehabilitation. About 30 minutes before my arrival, the staff states that he was noted to be short of breath and coughing repetitively. Food was all over the place. The patient was able to provide history that he does have problems with this occasion. He states he coughs \"all the time. \". He has been doing this for the last several years. He states that he swallows okay but food regurgitates up. Patient describes vomiting at times. About 2 years ago, patient states he saw Dr. Cecily Lockwood and underwent an EGD and had Botox injections. Based on his history it sounds like he may have a case of achalasia.   According to the son his care is divided up between the [place] at this regular community internist.  His son thinks he had a Botox treatment about 6 months ago but is not clear on the date. The patient states he has regurgitation is getting worse over the last few weeks. The son states that he has lost weight and has been declining at home. The son states he is able to recall things from years past but may have some short-term memory impairment. He thinks his dad is more confused since being in the hospital.    Tonight the patient states he does feel a little short of breath and states that he is regurgitating his food. He also states that he wheezes at home. He does not have any inhalers. Additionally, the patient is also complaining of lower abdominal discomfort. He denies any constipation or diarrhea. He states that he is not been able to urinate very easily. He does state that he had to have a catheter in the hospital.  The son corroborates this report. He does have prostate issues and is on Flomax. 8/21/2020 COVID-19-- Negative.       8/24/2020 LAB  Testing 3.9  Chloride 102  CO2 27  Glucose 91  BUN 27  Creatinine 1.3  GFR 51    8/21/2020 LAB:  WBC 10.2  Hemoglobin 11.6  Hematocrit 30.6%  Platelet count 335,793    8/20 LAB:  Pro-BNP-- 1925      Medical/Surgical Histories     Past Medical History:   Diagnosis Date    Achalasia     Arthritis     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 [facility]     [facility]    CAD (coronary artery disease)     stents x 3 - dr Abdon Cornejo   [facility] Cervical spondylosis     DVT (deep venous thrombosis) ([facility] [address] 2003    right leg    Hemorrhoids     Hyperlipidemia     Hypertension     PUD (peptic ulcer disease)     Sciatica     War injury due to shrapnel     leg      No prior H/O DVT, PE or bleeding dyscrasias    Past Surgical History:   Procedure Laterality Date    APPENDECTOMY      [facility]ACT REMOVAL [facility]ilateral     [facility]    CORONARY ANGIOPLASTY WITH STENT PLACEMENT      HEMORRHOID SURGERY      HERNIA REPAIR      inguinal    HIP SURGERY Left 8/22/2020    LEFT HIP PERCUTANUOUS PINNING performed by Renu Wilson DO at [address] Left     laser repair   [facility] ENDOSCOPY  2018    Dr. Mulligan, Botox           Medications/Allergies     Current Outpatient Medications   Medication Sig Dispense Refill    traMADol (ULTRAM) 50 MG tablet Take 1 tablet by mouth every 6 hours as needed for Pain for up to 3 days. 12 tablet 0    Heparin Sodium, Porcine, (HEPARIN, PORCINE,) 5000 UNIT/ML injection Inject 1 mL into the skin every 8 hours for 28 days 84 mL 0    tamsulosin (FLOMAX) 0.4 MG capsule Take 2 capsules by mouth nightly 30 capsule 3    levothyroxine (SYNTHROID) 50 MCG tablet Take 50 mcg by mouth Daily      torsemide (DEMADEX) 20 MG tablet Take 0.5 tablets by mouth daily 30 tablet 3    pantoprazole (PROTONIX) 40 MG tablet Take 1 tablet by mouth 2 times daily 60 tablet 3    lisinopril (PRINIVIL) 20 MG tablet Take 1 tablet by mouth daily. 30 tablet 3    clopidogrel (PLAVIX) 75 MG tablet Take 75 mg by mouth daily.  aspirin 81 MG tablet Take 81 mg by mouth daily.  atorvastatin (LIPITOR) 80 MG tablet Take 1 tablet by mouth daily. 30 tablet 11         No Known Allergies      Substance Use History     Social History     Tobacco Use    Smoking status: Never Smoker    Smokeless tobacco: Never Used   Substance Use Topics    Alcohol use: No    Drug use: No          Family History     No family history on file. No family history of problems with general anesthesia, venous thrombosis, or bleeding dyscrasias. REVIEW OF SYSTEMS:    CONSTITUTIONAL: + Fatigue, + weight loss. According to the electronic health record he has lost about 10 pounds in the last 1 year. EYES: Neg  Blurry vision, loss of vision, double vision, tearing, itching, eye pain. EARS: + Hearing loss. Has hearing aids but not sure if working.   He gets these from the [place]  NOSE:  Neg  Rhinorrhea, sneezing, itching, allergy, epistaxis, or of the president. He is disoriented to the month and date. Repetitive coughing throughout the visit. Extremely hard of hearing  HEAD:  NC/AT, no lesions. EYES:  SARAH, EOMI, No scleral icterus or conjunctival injection or discharge. Visual fields in tact to confrontation. Fundoscopic (non-dilated) grossly normal.  Disc margins well demarcated. EARS:  EAC's clear, TM's normal.  Extremely hard of hearing  NOSE:  Nasal cavity is clear. No mucosal congestion or discharge. Sinuses are nontender. MOUTH & THROAT:  Oral cavity is clear without mucosal lesions. Tongue is midline. Dentition is in fair repair. No pharyngeal erythema or exudate. NECK:  Supple. Full ROM. Trachea is midline. No increased JVD. No thyromegaly or nodules. No masses tachycardic. No murmurs. LYMPH: No C/SC/A/F nodes  CARDIAC:  S1S2 NL. Regular rhythm. Tachycardic. No murmurs. No ectopy  VASC:  Pedal pulses 2/4. Carotid upstrokes 2+. No bruits noted. PULM: + Diffuse expiratory wheezing, right mid zone and basilar crackles  GI:  Abdomen is soft. We will straight cath,---->  750 cc of Coca-Cola colored urine  EXT:  No Cyanosis or clubbing.  + Bilateral lower extremity edema, left> right  SKIN: Warm and dry, no rashes. Left upper arm with ecchymosis and abrasion. NEURO:  Cranial nerves 2-12 are NL. Speech fluent and coherent. Strength is 5/5 in all muscle groups. No sensory deficits. No focal or lateralizing deficits. Reflexes 2/4 and symmetric. Gait not assessed  MS: + Left hip tenderness with palpation. Range of motion not assessed  PSYCH:  Mood and affect NL. Judgement and insight NL.          ASSESSMENT/PLAN:    1. Closed left hip fracture, initial encounter [facility])  This is a new problem. PT/OT  Weightbearing as tolerated  DVT prophylaxis with heparin for the next 8 days per orthopedic protocol.     2. Aspiration pneumonitis ([address]  This is a new problem and just occurred about 30 minutes before my arrival.  Associated acute hypoxic respiratory failure. Start empiric antibiotics with Avelox 400 mg daily for a week  Hand-held nebulizer treatments with DuoNeb  Stat chest x-ray    3. Achalasia  Condition may be worsening. May be time for repeat EGD with Botox treatment  Will try to arrange through the [facility]  Nutritional support  Speech therapy    4. Urinary retention  This is a recurrent problem. He had problems in the hospital with urinary retention. He required a catheter in the hospital  Straight cath as noted above. 750 cc of Coca-Cola colored urine  Check UA C&S    5. Acute respiratory failure with hypoxia (HCC)  Associated with aspiration  Oxygen as needed    6. Multiple falls  Patient may have been experiencing some decline over the last several months cognitively and physically. He is lost some weight. This may be due to the achalasia. 7. Benign essential HTN  After he was able to clear his airway his blood pressure came down to 133/78  Continue to monitor blood pressure was initially accelerated upon my arrival at 192/85 but    8. Coronary artery disease involving native coronary artery of native heart without angina pectoris  Condition is stable without signs of angina  Continue to monitor for angina or anginal equivalents. 9. Chronic diastolic CHF (congestive heart failure) (HCC)  Condition is stable without signs of fluid overload. Daily weights    10. Esophageal dysphagia  Speech therapy for bedside assessment  A modified barium swallow may be warranted  Will likely need repeat Botox injection    11. 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 symptoms  Condition is uncontrolled  Patient with evidence of urinary retention  Straight cath every 6 as needed     12. Bilateral leg edema  Continue to monitor  Patient is on heparin for DVT prophylaxis  Patient does have a history of swelling at home    13.  Contusion of left upper arm, initial encounter  Wound care for left upper extremity abrasion details…

## 2020-08-27 ENCOUNTER — OUTSIDE SERVICES (OUTPATIENT)
Dept: INTERNAL MEDICINE CLINIC | Age: 85
End: 2020-08-27

## 2020-08-27 ENCOUNTER — OUTSIDE SERVICES (OUTPATIENT)
Dept: INTERNAL MEDICINE CLINIC | Age: 85
End: 2020-08-27
Payer: MEDICARE

## 2020-08-27 VITALS
SYSTOLIC BLOOD PRESSURE: 158 MMHG | DIASTOLIC BLOOD PRESSURE: 60 MMHG | OXYGEN SATURATION: 94 % | TEMPERATURE: 98.4 F | HEART RATE: 74 BPM | RESPIRATION RATE: 16 BRPM

## 2020-08-27 PROCEDURE — 99308 SBSQ NF CARE LOW MDM 20: CPT | Performed by: INTERNAL MEDICINE

## 2020-08-27 RX ORDER — ONDANSETRON 4 MG/1
4 TABLET, FILM COATED ORAL EVERY 6 HOURS PRN
COMMUNITY
End: 2022-01-05 | Stop reason: ALTCHOICE

## 2020-08-27 RX ORDER — FINASTERIDE 5 MG/1
5 TABLET, FILM COATED ORAL DAILY
COMMUNITY
End: 2021-07-08 | Stop reason: SDUPTHER

## 2020-08-27 NOTE — PROGRESS NOTES
[facility]) Physicians  Internal Medicine  Patient Encounter  Melisa Frazier D.O., [facility]        Chief Complaint   Patient presents with    Abnormal Test Results       HPI: 80 y.o. male seen again today to review abnormal test results. He was seen yesterday actually during an aspiration event. His oral intake has been fair to poor as he does not tolerate swallowing food or liquids at this time. He has a history of achalasia and it seems like symptoms are worsening. He has been losing weight at home. Yesterday he had been wheezing and was hypoxic transiently. He did better after breathing treatment. The patient actually indicated he was feeling much better. Today he has struggled to take in much orally. Staff states he just throws his food up. Chest x-ray from yesterday showed a possible paratracheal mass that was pushing on the trachea causing tracheal deviation. Could this mass simply be food impaction in the esophagus. Patient states he feels drowsy but denies shortness of breath at this time. He has intermittent coughing. He denies as much wheezing as yesterday. Lab work from this morning:  Sodium 143, potassium 4.1, BUN 58, creatinine 1.7. (Renal profile 8/24/2020-BUN 27, creatinine 1.3). LFTs-within normal limits  Magnesium elevated at 2.9  White blood cell count 14,600  H&H 12.2/34.6%  Platelet count 210,578.         Past Medical History:   Diagnosis Date    Achalasia     Arthritis     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 symptoms     per [facility]    CAD (coronary artery disease)     stents x 3 - dr han   [facility] Cervical spondylosis     DVT (deep venous thrombosis) ([facility] [address] 2003    right leg    Hemorrhoids     Hyperlipidemia     Hypertension     PUD (peptic ulcer disease)     Sciatica     War injury due to shrapnel     leg         MEDICATIONS:  Medication Sig   ondansetron (ZOFRAN) 4 MG tablet Take 4 mg by mouth every 6 hours as needed for Nausea or Vomiting   finasteride understanding of their use, indication and potential side effects. Pt also understands the above recommendations. All questions answered. This note was generated completely or in part utilizing Dragon dictation speech recognition software. Occasionally, words are mistranscribed and despite editing, the text may contain inaccuracies due to incorrect word recognition.   If further clarification is needed please contact the office at [phone]       Electronically signed    Channing Batista D.O.

## 2020-08-28 ENCOUNTER — ANESTHESIA EVENT (OUTPATIENT)
Dept: OPERATING ROOM | Age: 85
DRG: 698 | End: 2020-08-28
Payer: MEDICARE

## 2020-08-28 ENCOUNTER — APPOINTMENT (OUTPATIENT)
Dept: CT IMAGING | Age: 85
DRG: 698 | End: 2020-08-28
Payer: MEDICARE

## 2020-08-28 ENCOUNTER — TELEPHONE (OUTPATIENT)
Dept: OTHER | Facility: CLINIC | Age: 85
End: 2020-08-28

## 2020-08-28 ENCOUNTER — APPOINTMENT (OUTPATIENT)
Dept: GENERAL RADIOLOGY | Age: 85
DRG: 698 | End: 2020-08-28
Payer: MEDICARE

## 2020-08-28 ENCOUNTER — ANESTHESIA (OUTPATIENT)
Dept: OPERATING ROOM | Age: 85
DRG: 698 | End: 2020-08-28
Payer: MEDICARE

## 2020-08-28 ENCOUNTER — HOSPITAL ENCOUNTER (INPATIENT)
Age: 85
LOS: 5 days | Discharge: NURSING, REHAB OR LONG-TERM CARE | DRG: 698 | End: 2020-09-02
Attending: EMERGENCY MEDICINE | Admitting: HOSPITALIST
Payer: MEDICARE

## 2020-08-28 VITALS
DIASTOLIC BLOOD PRESSURE: 101 MMHG | OXYGEN SATURATION: 100 % | RESPIRATION RATE: 26 BRPM | SYSTOLIC BLOOD PRESSURE: 170 MMHG

## 2020-08-28 LAB
ANION GAP SERPL CALCULATED.3IONS-SCNC: 20 MMOL/L (ref 3–16)
ANION GAP SERPL CALCULATED.3IONS-SCNC: 21 MMOL/L (ref 3–16)
BACTERIA: ABNORMAL /HPF
BASE EXCESS VENOUS: -2.9 MMOL/L (ref -2–3)
BASOPHILS ABSOLUTE: 0 K/UL (ref 0–0.2)
BASOPHILS RELATIVE PERCENT: 0 %
BILIRUBIN URINE: NEGATIVE
BLOOD, URINE: ABNORMAL
BUN BLDV-MCNC: 59 MG/DL (ref 7–20)
BUN BLDV-MCNC: 60 MG/DL (ref 7–20)
CALCIUM SERPL-MCNC: 8.6 MG/DL (ref 8.3–10.6)
CALCIUM SERPL-MCNC: 8.7 MG/DL (ref 8.3–10.6)
CARBOXYHEMOGLOBIN: 0.9 % (ref 0–1.5)
CHLORIDE BLD-SCNC: 104 MMOL/L (ref 99–110)
CHLORIDE BLD-SCNC: 107 MMOL/L (ref 99–110)
CLARITY: ABNORMAL
CO2: 17 MMOL/L (ref 21–32)
CO2: 17 MMOL/L (ref 21–32)
COLOR: ABNORMAL
CREAT SERPL-MCNC: 1.4 MG/DL (ref 0.8–1.3)
CREAT SERPL-MCNC: 1.4 MG/DL (ref 0.8–1.3)
EOSINOPHILS ABSOLUTE: 0 K/UL (ref 0–0.6)
EOSINOPHILS RELATIVE PERCENT: 0 %
EPITHELIAL CELLS, UA: ABNORMAL /HPF (ref 0–5)
GFR AFRICAN AMERICAN: 57
GFR AFRICAN AMERICAN: 57
GFR NON-AFRICAN AMERICAN: 47
GFR NON-AFRICAN AMERICAN: 47
GLUCOSE BLD-MCNC: 124 MG/DL (ref 70–99)
GLUCOSE BLD-MCNC: 140 MG/DL (ref 70–99)
GLUCOSE URINE: 100 MG/DL
HCO3 VENOUS: 19.7 MMOL/L (ref 24–28)
HCT VFR BLD CALC: 35.7 % (ref 40.5–52.5)
HEMOGLOBIN: 11.5 G/DL (ref 13.5–17.5)
INR BLD: 1.05 (ref 0.86–1.14)
KETONES, URINE: 15 MG/DL
LACTIC ACID, SEPSIS: 1.5 MMOL/L (ref 0.4–1.9)
LEUKOCYTE ESTERASE, URINE: ABNORMAL
LYMPHOCYTES ABSOLUTE: 11.6 K/UL (ref 1–5.1)
LYMPHOCYTES RELATIVE PERCENT: 56 %
MAGNESIUM: 3 MG/DL (ref 1.8–2.4)
MCH RBC QN AUTO: 31.9 PG (ref 26–34)
MCHC RBC AUTO-ENTMCNC: 32.3 G/DL (ref 31–36)
MCV RBC AUTO: 98.5 FL (ref 80–100)
METHEMOGLOBIN VENOUS: 0.3 % (ref 0–1.5)
MICROSCOPIC EXAMINATION: YES
MONOCYTES ABSOLUTE: 0.4 K/UL (ref 0–1.3)
MONOCYTES RELATIVE PERCENT: 2 %
NEUTROPHILS ABSOLUTE: 8.7 K/UL (ref 1.7–7.7)
NEUTROPHILS RELATIVE PERCENT: 42 %
NITRITE, URINE: POSITIVE
O2 SAT, VEN: 89 %
PCO2, VEN: 27.5 MMHG (ref 41–51)
PDW BLD-RTO: 13.5 % (ref 12.4–15.4)
PH UA: 6.5 (ref 5–8)
PH VENOUS: 7.46 (ref 7.35–7.45)
PLATELET # BLD: 244 K/UL (ref 135–450)
PMV BLD AUTO: 9.2 FL (ref 5–10.5)
PO2, VEN: 54.4 MMHG (ref 25–40)
POTASSIUM REFLEX MAGNESIUM: 3.9 MMOL/L (ref 3.5–5.1)
POTASSIUM SERPL-SCNC: 3.8 MMOL/L (ref 3.5–5.1)
PRO-BNP: 2980 PG/ML (ref 0–449)
PROTEIN UA: 100 MG/DL
PROTHROMBIN TIME: 12.2 SEC (ref 10–13.2)
RBC # BLD: 3.62 M/UL (ref 4.2–5.9)
RBC UA: >100 /HPF (ref 0–4)
SODIUM BLD-SCNC: 141 MMOL/L (ref 136–145)
SODIUM BLD-SCNC: 145 MMOL/L (ref 136–145)
SPECIFIC GRAVITY UA: 1.01 (ref 1–1.03)
TCO2 CALC VENOUS: 21 MMOL/L
TROPONIN: 0.04 NG/ML
TROPONIN: 0.04 NG/ML
URINE TYPE: ABNORMAL
UROBILINOGEN, URINE: 1 E.U./DL
WBC # BLD: 20.7 K/UL (ref 4–11)
WBC UA: ABNORMAL /HPF (ref 0–5)

## 2020-08-28 PROCEDURE — 2580000003 HC RX 258: Performed by: ANESTHESIOLOGY

## 2020-08-28 PROCEDURE — 99284 EMERGENCY DEPT VISIT MOD MDM: CPT

## 2020-08-28 PROCEDURE — 6360000002 HC RX W HCPCS: Performed by: UROLOGY

## 2020-08-28 PROCEDURE — 2580000003 HC RX 258: Performed by: UROLOGY

## 2020-08-28 PROCEDURE — 2500000003 HC RX 250 WO HCPCS: Performed by: STUDENT IN AN ORGANIZED HEALTH CARE EDUCATION/TRAINING PROGRAM

## 2020-08-28 PROCEDURE — 0TCB8ZZ EXTIRPATION OF MATTER FROM BLADDER, VIA NATURAL OR ARTIFICIAL OPENING ENDOSCOPIC: ICD-10-PCS | Performed by: UROLOGY

## 2020-08-28 PROCEDURE — 6370000000 HC RX 637 (ALT 250 FOR IP): Performed by: STUDENT IN AN ORGANIZED HEALTH CARE EDUCATION/TRAINING PROGRAM

## 2020-08-28 PROCEDURE — 1200000000 HC SEMI PRIVATE

## 2020-08-28 PROCEDURE — 6370000000 HC RX 637 (ALT 250 FOR IP): Performed by: UROLOGY

## 2020-08-28 PROCEDURE — 87040 BLOOD CULTURE FOR BACTERIA: CPT

## 2020-08-28 PROCEDURE — 2500000003 HC RX 250 WO HCPCS: Performed by: ANESTHESIOLOGY

## 2020-08-28 PROCEDURE — 85610 PROTHROMBIN TIME: CPT

## 2020-08-28 PROCEDURE — 7100000001 HC PACU RECOVERY - ADDTL 15 MIN: Performed by: UROLOGY

## 2020-08-28 PROCEDURE — 71045 X-RAY EXAM CHEST 1 VIEW: CPT

## 2020-08-28 PROCEDURE — 36415 COLL VENOUS BLD VENIPUNCTURE: CPT

## 2020-08-28 PROCEDURE — 84484 ASSAY OF TROPONIN QUANT: CPT

## 2020-08-28 PROCEDURE — 81001 URINALYSIS AUTO W/SCOPE: CPT

## 2020-08-28 PROCEDURE — 83605 ASSAY OF LACTIC ACID: CPT

## 2020-08-28 PROCEDURE — 2580000003 HC RX 258: Performed by: STUDENT IN AN ORGANIZED HEALTH CARE EDUCATION/TRAINING PROGRAM

## 2020-08-28 PROCEDURE — 74176 CT ABD & PELVIS W/O CONTRAST: CPT

## 2020-08-28 PROCEDURE — 80048 BASIC METABOLIC PNL TOTAL CA: CPT

## 2020-08-28 PROCEDURE — 7100000000 HC PACU RECOVERY - FIRST 15 MIN: Performed by: UROLOGY

## 2020-08-28 PROCEDURE — 6360000002 HC RX W HCPCS: Performed by: ANESTHESIOLOGY

## 2020-08-28 PROCEDURE — 2580000003 HC RX 258: Performed by: EMERGENCY MEDICINE

## 2020-08-28 PROCEDURE — 3600000013 HC SURGERY LEVEL 3 ADDTL 15MIN: Performed by: UROLOGY

## 2020-08-28 PROCEDURE — 3700000001 HC ADD 15 MINUTES (ANESTHESIA): Performed by: UROLOGY

## 2020-08-28 PROCEDURE — 3600000003 HC SURGERY LEVEL 3 BASE: Performed by: UROLOGY

## 2020-08-28 PROCEDURE — C1769 GUIDE WIRE: HCPCS | Performed by: UROLOGY

## 2020-08-28 PROCEDURE — 3C1ZX8Z IRRIGATION OF INDWELLING DEVICE USING IRRIGATING SUBSTANCE, EXTERNAL APPROACH: ICD-10-PCS | Performed by: UROLOGY

## 2020-08-28 PROCEDURE — 94640 AIRWAY INHALATION TREATMENT: CPT

## 2020-08-28 PROCEDURE — 83735 ASSAY OF MAGNESIUM: CPT

## 2020-08-28 PROCEDURE — 93005 ELECTROCARDIOGRAM TRACING: CPT

## 2020-08-28 PROCEDURE — 99222 1ST HOSP IP/OBS MODERATE 55: CPT | Performed by: HOSPITALIST

## 2020-08-28 PROCEDURE — 82803 BLOOD GASES ANY COMBINATION: CPT

## 2020-08-28 PROCEDURE — 3700000000 HC ANESTHESIA ATTENDED CARE: Performed by: UROLOGY

## 2020-08-28 PROCEDURE — 85025 COMPLETE CBC W/AUTO DIFF WBC: CPT

## 2020-08-28 PROCEDURE — 2709999900 HC NON-CHARGEABLE SUPPLY: Performed by: UROLOGY

## 2020-08-28 PROCEDURE — 83880 ASSAY OF NATRIURETIC PEPTIDE: CPT

## 2020-08-28 PROCEDURE — 87086 URINE CULTURE/COLONY COUNT: CPT

## 2020-08-28 RX ORDER — SODIUM CHLORIDE 0.9 % (FLUSH) 0.9 %
10 SYRINGE (ML) INJECTION PRN
Status: DISCONTINUED | OUTPATIENT
Start: 2020-08-28 | End: 2020-09-02 | Stop reason: HOSPADM

## 2020-08-28 RX ORDER — METOPROLOL TARTRATE 5 MG/5ML
5 INJECTION INTRAVENOUS ONCE
Status: COMPLETED | OUTPATIENT
Start: 2020-08-28 | End: 2020-08-28

## 2020-08-28 RX ORDER — TAMSULOSIN HYDROCHLORIDE 0.4 MG/1
0.4 CAPSULE ORAL NIGHTLY
Status: DISCONTINUED | OUTPATIENT
Start: 2020-08-28 | End: 2020-09-02 | Stop reason: HOSPADM

## 2020-08-28 RX ORDER — LIDOCAINE HYDROCHLORIDE 20 MG/ML
INJECTION, SOLUTION INTRAVENOUS PRN
Status: DISCONTINUED | OUTPATIENT
Start: 2020-08-28 | End: 2020-08-28 | Stop reason: SDUPTHER

## 2020-08-28 RX ORDER — HEPARIN SODIUM 5000 [USP'U]/ML
5000 INJECTION, SOLUTION INTRAVENOUS; SUBCUTANEOUS EVERY 8 HOURS SCHEDULED
Status: DISCONTINUED | OUTPATIENT
Start: 2020-08-28 | End: 2020-09-02 | Stop reason: HOSPADM

## 2020-08-28 RX ORDER — ACETAMINOPHEN 325 MG/1
650 TABLET ORAL EVERY 6 HOURS PRN
Status: DISCONTINUED | OUTPATIENT
Start: 2020-08-28 | End: 2020-09-02 | Stop reason: HOSPADM

## 2020-08-28 RX ORDER — TORSEMIDE 10 MG/1
10 TABLET ORAL DAILY
Status: CANCELLED | OUTPATIENT
Start: 2020-08-28

## 2020-08-28 RX ORDER — POLYETHYLENE GLYCOL 3350 17 G/17G
17 POWDER, FOR SOLUTION ORAL DAILY PRN
Status: DISCONTINUED | OUTPATIENT
Start: 2020-08-28 | End: 2020-09-02 | Stop reason: HOSPADM

## 2020-08-28 RX ORDER — FENTANYL CITRATE 50 UG/ML
25 INJECTION, SOLUTION INTRAMUSCULAR; INTRAVENOUS EVERY 5 MIN PRN
Status: DISCONTINUED | OUTPATIENT
Start: 2020-08-28 | End: 2020-08-28 | Stop reason: HOSPADM

## 2020-08-28 RX ORDER — FENTANYL CITRATE 50 UG/ML
INJECTION, SOLUTION INTRAMUSCULAR; INTRAVENOUS PRN
Status: DISCONTINUED | OUTPATIENT
Start: 2020-08-28 | End: 2020-08-28 | Stop reason: SDUPTHER

## 2020-08-28 RX ORDER — PANTOPRAZOLE SODIUM 40 MG/1
40 TABLET, DELAYED RELEASE ORAL 2 TIMES DAILY
Status: DISCONTINUED | OUTPATIENT
Start: 2020-08-28 | End: 2020-08-28

## 2020-08-28 RX ORDER — TAMSULOSIN HYDROCHLORIDE 0.4 MG/1
0.4 CAPSULE ORAL NIGHTLY
COMMUNITY
End: 2022-01-05 | Stop reason: SINTOL

## 2020-08-28 RX ORDER — ATORVASTATIN CALCIUM 80 MG/1
80 TABLET, FILM COATED ORAL NIGHTLY
COMMUNITY

## 2020-08-28 RX ORDER — LISINOPRIL 20 MG/1
20 TABLET ORAL DAILY
Status: CANCELLED | OUTPATIENT
Start: 2020-08-28

## 2020-08-28 RX ORDER — LEVOFLOXACIN 750 MG/1
750 TABLET ORAL
Status: DISCONTINUED | OUTPATIENT
Start: 2020-08-29 | End: 2020-08-30

## 2020-08-28 RX ORDER — ACETAMINOPHEN 650 MG/1
650 SUPPOSITORY RECTAL EVERY 6 HOURS PRN
Status: DISCONTINUED | OUTPATIENT
Start: 2020-08-28 | End: 2020-09-02 | Stop reason: HOSPADM

## 2020-08-28 RX ORDER — LEVOTHYROXINE SODIUM 0.05 MG/1
50 TABLET ORAL DAILY
Status: DISCONTINUED | OUTPATIENT
Start: 2020-08-29 | End: 2020-09-02 | Stop reason: HOSPADM

## 2020-08-28 RX ORDER — ONDANSETRON 2 MG/ML
4 INJECTION INTRAMUSCULAR; INTRAVENOUS
Status: DISCONTINUED | OUTPATIENT
Start: 2020-08-28 | End: 2020-08-28 | Stop reason: HOSPADM

## 2020-08-28 RX ORDER — SUCCINYLCHOLINE CHLORIDE 20 MG/ML
INJECTION INTRAMUSCULAR; INTRAVENOUS PRN
Status: DISCONTINUED | OUTPATIENT
Start: 2020-08-28 | End: 2020-08-28 | Stop reason: SDUPTHER

## 2020-08-28 RX ORDER — SODIUM CHLORIDE 0.9 % (FLUSH) 0.9 %
10 SYRINGE (ML) INJECTION EVERY 12 HOURS SCHEDULED
Status: DISCONTINUED | OUTPATIENT
Start: 2020-08-28 | End: 2020-09-02 | Stop reason: HOSPADM

## 2020-08-28 RX ORDER — ONDANSETRON 4 MG/1
4 TABLET, FILM COATED ORAL EVERY 6 HOURS PRN
Status: DISCONTINUED | OUTPATIENT
Start: 2020-08-28 | End: 2020-09-02 | Stop reason: HOSPADM

## 2020-08-28 RX ORDER — ROCURONIUM BROMIDE 10 MG/ML
INJECTION, SOLUTION INTRAVENOUS PRN
Status: DISCONTINUED | OUTPATIENT
Start: 2020-08-28 | End: 2020-08-28 | Stop reason: SDUPTHER

## 2020-08-28 RX ORDER — PANTOPRAZOLE SODIUM 40 MG/1
40 TABLET, DELAYED RELEASE ORAL 2 TIMES DAILY
COMMUNITY

## 2020-08-28 RX ORDER — IPRATROPIUM BROMIDE AND ALBUTEROL SULFATE 2.5; .5 MG/3ML; MG/3ML
1 SOLUTION RESPIRATORY (INHALATION) EVERY 6 HOURS PRN
Status: DISCONTINUED | OUTPATIENT
Start: 2020-08-28 | End: 2020-08-31

## 2020-08-28 RX ORDER — POTASSIUM CHLORIDE 20 MEQ/1
40 TABLET, EXTENDED RELEASE ORAL ONCE
Status: COMPLETED | OUTPATIENT
Start: 2020-08-28 | End: 2020-08-28

## 2020-08-28 RX ORDER — FENTANYL CITRATE 50 UG/ML
50 INJECTION, SOLUTION INTRAMUSCULAR; INTRAVENOUS EVERY 5 MIN PRN
Status: DISCONTINUED | OUTPATIENT
Start: 2020-08-28 | End: 2020-08-28 | Stop reason: HOSPADM

## 2020-08-28 RX ORDER — TRAMADOL HYDROCHLORIDE 50 MG/1
50 TABLET ORAL EVERY 6 HOURS PRN
COMMUNITY
End: 2022-01-05 | Stop reason: ALTCHOICE

## 2020-08-28 RX ORDER — TRAMADOL HYDROCHLORIDE 50 MG/1
25 TABLET ORAL EVERY 6 HOURS PRN
Status: DISCONTINUED | OUTPATIENT
Start: 2020-08-28 | End: 2020-09-02 | Stop reason: HOSPADM

## 2020-08-28 RX ORDER — ATORVASTATIN CALCIUM 80 MG/1
80 TABLET, FILM COATED ORAL NIGHTLY
Status: DISCONTINUED | OUTPATIENT
Start: 2020-08-28 | End: 2020-09-02 | Stop reason: HOSPADM

## 2020-08-28 RX ORDER — DEXAMETHASONE SODIUM PHOSPHATE 4 MG/ML
4 INJECTION, SOLUTION INTRA-ARTICULAR; INTRALESIONAL; INTRAMUSCULAR; INTRAVENOUS; SOFT TISSUE
Status: DISCONTINUED | OUTPATIENT
Start: 2020-08-28 | End: 2020-08-28 | Stop reason: HOSPADM

## 2020-08-28 RX ORDER — 0.9 % SODIUM CHLORIDE 0.9 %
1000 INTRAVENOUS SOLUTION INTRAVENOUS ONCE
Status: COMPLETED | OUTPATIENT
Start: 2020-08-28 | End: 2020-08-28

## 2020-08-28 RX ORDER — PROPOFOL 10 MG/ML
INJECTION, EMULSION INTRAVENOUS PRN
Status: DISCONTINUED | OUTPATIENT
Start: 2020-08-28 | End: 2020-08-28 | Stop reason: SDUPTHER

## 2020-08-28 RX ORDER — LABETALOL 20 MG/4 ML (5 MG/ML) INTRAVENOUS SYRINGE
10 EVERY 6 HOURS PRN
Status: DISCONTINUED | OUTPATIENT
Start: 2020-08-28 | End: 2020-09-02 | Stop reason: HOSPADM

## 2020-08-28 RX ORDER — TAMSULOSIN HYDROCHLORIDE 0.4 MG/1
0.4 CAPSULE ORAL NIGHTLY
Status: CANCELLED | OUTPATIENT
Start: 2020-08-28

## 2020-08-28 RX ORDER — PANTOPRAZOLE SODIUM 40 MG/1
40 TABLET, DELAYED RELEASE ORAL DAILY
Status: DISCONTINUED | OUTPATIENT
Start: 2020-08-29 | End: 2020-08-30

## 2020-08-28 RX ORDER — IPRATROPIUM BROMIDE AND ALBUTEROL SULFATE 2.5; .5 MG/3ML; MG/3ML
1 SOLUTION RESPIRATORY (INHALATION) EVERY 6 HOURS
Status: DISCONTINUED | OUTPATIENT
Start: 2020-08-28 | End: 2020-08-28

## 2020-08-28 RX ORDER — IPRATROPIUM BROMIDE AND ALBUTEROL SULFATE 2.5; .5 MG/3ML; MG/3ML
1 SOLUTION RESPIRATORY (INHALATION) EVERY 6 HOURS
COMMUNITY
End: 2020-09-27 | Stop reason: ALTCHOICE

## 2020-08-28 RX ORDER — PROMETHAZINE HYDROCHLORIDE 25 MG/1
12.5 TABLET ORAL EVERY 6 HOURS PRN
Status: DISCONTINUED | OUTPATIENT
Start: 2020-08-28 | End: 2020-09-02 | Stop reason: HOSPADM

## 2020-08-28 RX ORDER — FINASTERIDE 5 MG/1
5 TABLET, FILM COATED ORAL DAILY
Status: DISCONTINUED | OUTPATIENT
Start: 2020-08-29 | End: 2020-09-02 | Stop reason: HOSPADM

## 2020-08-28 RX ORDER — LISINOPRIL 20 MG/1
20 TABLET ORAL DAILY
COMMUNITY

## 2020-08-28 RX ORDER — MAGNESIUM HYDROXIDE 1200 MG/15ML
LIQUID ORAL PRN
Status: DISCONTINUED | OUTPATIENT
Start: 2020-08-28 | End: 2020-08-28 | Stop reason: ALTCHOICE

## 2020-08-28 RX ORDER — SODIUM CHLORIDE, SODIUM LACTATE, POTASSIUM CHLORIDE, CALCIUM CHLORIDE 600; 310; 30; 20 MG/100ML; MG/100ML; MG/100ML; MG/100ML
INJECTION, SOLUTION INTRAVENOUS CONTINUOUS PRN
Status: DISCONTINUED | OUTPATIENT
Start: 2020-08-28 | End: 2020-08-28 | Stop reason: SDUPTHER

## 2020-08-28 RX ORDER — TORSEMIDE 10 MG/1
10 TABLET ORAL DAILY
COMMUNITY
End: 2020-10-16 | Stop reason: SDUPTHER

## 2020-08-28 RX ORDER — ASPIRIN 81 MG/1
81 TABLET ORAL DAILY
Status: DISCONTINUED | OUTPATIENT
Start: 2020-08-29 | End: 2020-09-02 | Stop reason: HOSPADM

## 2020-08-28 RX ORDER — TAMSULOSIN HYDROCHLORIDE 0.4 MG/1
0.8 CAPSULE ORAL NIGHTLY
Status: DISCONTINUED | OUTPATIENT
Start: 2020-08-28 | End: 2020-08-28 | Stop reason: DRUGHIGH

## 2020-08-28 RX ORDER — ONDANSETRON 2 MG/ML
4 INJECTION INTRAMUSCULAR; INTRAVENOUS EVERY 6 HOURS PRN
Status: DISCONTINUED | OUTPATIENT
Start: 2020-08-28 | End: 2020-09-02 | Stop reason: HOSPADM

## 2020-08-28 RX ORDER — MOXIFLOXACIN HYDROCHLORIDE 400 MG/1
400 TABLET ORAL DAILY
COMMUNITY
Start: 2020-08-27 | End: 2020-09-03

## 2020-08-28 RX ORDER — IPRATROPIUM BROMIDE AND ALBUTEROL SULFATE 2.5; .5 MG/3ML; MG/3ML
1 SOLUTION RESPIRATORY (INHALATION) EVERY 6 HOURS PRN
COMMUNITY
End: 2022-01-05 | Stop reason: ALTCHOICE

## 2020-08-28 RX ORDER — ATORVASTATIN CALCIUM 80 MG/1
80 TABLET, FILM COATED ORAL NIGHTLY
Status: DISCONTINUED | OUTPATIENT
Start: 2020-08-28 | End: 2020-08-28

## 2020-08-28 RX ADMIN — PROPOFOL 50 MG: 10 INJECTION, EMULSION INTRAVENOUS at 14:43

## 2020-08-28 RX ADMIN — FENTANYL CITRATE 50 MCG: 50 INJECTION INTRAMUSCULAR; INTRAVENOUS at 14:43

## 2020-08-28 RX ADMIN — PHENYLEPHRINE HYDROCHLORIDE 50 MCG: 10 INJECTION, SOLUTION INTRAMUSCULAR; INTRAVENOUS; SUBCUTANEOUS at 14:46

## 2020-08-28 RX ADMIN — SUGAMMADEX 200 MG: 100 INJECTION, SOLUTION INTRAVENOUS at 15:03

## 2020-08-28 RX ADMIN — IPRATROPIUM BROMIDE AND ALBUTEROL SULFATE 3 ML: .5; 3 SOLUTION RESPIRATORY (INHALATION) at 18:38

## 2020-08-28 RX ADMIN — TAMSULOSIN HYDROCHLORIDE 0.4 MG: 0.4 CAPSULE ORAL at 20:53

## 2020-08-28 RX ADMIN — HEPARIN SODIUM 5000 UNITS: 5000 INJECTION INTRAVENOUS; SUBCUTANEOUS at 22:42

## 2020-08-28 RX ADMIN — SUCCINYLCHOLINE CHLORIDE 120 MG: 20 INJECTION, SOLUTION INTRAMUSCULAR; INTRAVENOUS; PARENTERAL at 14:43

## 2020-08-28 RX ADMIN — Medication 10 ML: at 20:54

## 2020-08-28 RX ADMIN — METOPROLOL TARTRATE 5 MG: 5 INJECTION INTRAVENOUS at 19:59

## 2020-08-28 RX ADMIN — ROCURONIUM BROMIDE 5 MG: 10 INJECTION INTRAVENOUS at 14:43

## 2020-08-28 RX ADMIN — SODIUM CHLORIDE, SODIUM LACTATE, POTASSIUM CHLORIDE, AND CALCIUM CHLORIDE: 600; 310; 30; 20 INJECTION, SOLUTION INTRAVENOUS at 14:43

## 2020-08-28 RX ADMIN — LIDOCAINE HYDROCHLORIDE 50 MG: 20 INJECTION, SOLUTION INTRAVENOUS at 14:43

## 2020-08-28 RX ADMIN — SODIUM CHLORIDE 1000 ML: 0.9 INJECTION, SOLUTION INTRAVENOUS at 12:50

## 2020-08-28 RX ADMIN — POTASSIUM CHLORIDE 40 MEQ: 1500 TABLET, EXTENDED RELEASE ORAL at 22:42

## 2020-08-28 RX ADMIN — ATORVASTATIN CALCIUM 80 MG: 80 TABLET, FILM COATED ORAL at 20:53

## 2020-08-28 ASSESSMENT — PULMONARY FUNCTION TESTS
PIF_VALUE: 2
PIF_VALUE: 13
PIF_VALUE: 14
PIF_VALUE: 2
PIF_VALUE: 20
PIF_VALUE: 13
PIF_VALUE: 12
PIF_VALUE: 20
PIF_VALUE: 1
PIF_VALUE: 12
PIF_VALUE: 1
PIF_VALUE: 3
PIF_VALUE: 18
PIF_VALUE: 13
PIF_VALUE: 2
PIF_VALUE: 16
PIF_VALUE: 17
PIF_VALUE: 2
PIF_VALUE: 3
PIF_VALUE: 2
PIF_VALUE: 17
PIF_VALUE: 27
PIF_VALUE: 6
PIF_VALUE: 14
PIF_VALUE: 2
PIF_VALUE: 12
PIF_VALUE: 1
PIF_VALUE: 2
PIF_VALUE: 12
PIF_VALUE: 1

## 2020-08-28 ASSESSMENT — PAIN SCALES - GENERAL
PAINLEVEL_OUTOF10: 0

## 2020-08-28 NOTE — CONSULTS
Urology Attending Consult Note      Reason for Consultation: gross hematuria     History: 81 yo NH M h/o CAD, CHF, achalasia, recent fall causing left femoral neck fracture, on ASA and plavix. Poor historian. NH has been CIC for retention issues. He presents with gross hematuria. A 3-way gonzales was placed in ER and irrigated for large amounts of clot. Cr @ 1.3 and H/H @ 11.6/33.6. Urine cx one week ago was negative. Family History, Social History, Review of Systems:  Reviewed and agreed to as per chart    Vitals:  BP (!) 153/83   Pulse 62   Temp 98.8 °F (37.1 °C)   Resp 17   Ht 6' 2\" (1.88 m)   Wt 184 lb (83.5 kg)   SpO2 97%   BMI 23.62 kg/m²   Temp  Av.6 °F (37 °C)  Min: 98.4 °F (36.9 °C)  Max: 98.8 °F (37.1 °C)  No intake or output data in the 24 hours ending 20 1149      Physical:   Well developed, well nourished in no acute distress   Mood indicates no abnormalities. Pt doesnt appear depressed   Orientated to time and place   Neck is supple, trachea is midline   Respiratory effort is normal   Cardiovascular show no extremity swelling   Abdomen no masses or hernias are palpated, there is no tenderness. Liver and Spleen appear normal.   Skin show no abnormal lesions   Lymph nodes are not palpated in the inguinal, neck, or axillary area.      Male :   Penis appears normal and circumcised   Urethral meatus is normal in size and location   Scrotum appears normal and both testicles appear normal in size and location   Gonzales draining bloody urine       Labs:  WBC:    Lab Results   Component Value Date    WBC 10.2 2020     Hemoglobin/Hematocrit:    Lab Results   Component Value Date    HGB 11.6 2020    HCT 33.6 2020     BMP:    Lab Results   Component Value Date     2020    K 3.9 2020    K 4.8 2020     2020    CO2 27 2020    BUN 27 2020    LABALBU 3.3 2020    CREATININE 1.3 2020    CALCIUM 8.3 08/24/2020    GFRAA >60 08/24/2020    GFRAA >60 06/08/2012    LABGLOM 51 08/24/2020     PT/INR:    Lab Results   Component Value Date    PROTIME 11.8 05/25/2016    INR 1.03 05/25/2016     PTT:  No results found for: APTT[APTT        Urine Culture: NGTD        Antibiotic Therapy: none. Imaging: no     Impression/Plan: 81 yo M with multiple medical issues, h/o ASA and plavix. He required CIC at nursing home. Admitted with gross hematuria. -irrigated gonzales for multiple clots.  CBI started  -continue CBI for now.   -keep NPO in case he needs cysto     Mele Mis, APRN - CNP

## 2020-08-28 NOTE — H&P
Internal Medicine  PGY ***  History & Physical      CC : Urine retention, blood clots out of penis    History Obtained From:  patient    HISTORY OF PRESENT ILLNESS:  Mr. Gokul Anderson is a 81 yo male who has a PMHx of CAD, BPH with obstruction, Left hip fracture and fixation, Chronic Diastolic HF, Aspiration pneumonitis 2/2 achalasia presented to the ED from [facility] for blood clots coming from his penis most likely due to straight cath at the nursing facility over past 4 days. Patient reports no urine production ***    Patient currently   Patient was recently admitted 08/20/20- 08/24/20 for left femoral neck fracture where Fixation was performed and [facility]. He further has episodes of aspiration most likely due to Achalasia, with most recent 08/26/20. He is currently on Avelox for treatment. In ED patient reported no pain, T: 98.8, R: 17, P: 62, BP: 153/83, up to 190/72, SPO2: 97%  Patient sent to CT for Abdominal imaging followed by Emergent Cystoscopy.      Past Medical History:        Diagnosis Date    Achalasia     Arthritis     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     [facility]    CAD (coronary artery disease)     stents x 3 - dr guille Arguello Cervical spondylosis     DVT (deep venous thrombosis) ([facility] [address] 2003    right leg    Hemorrhoids     Hyperlipidemia     Hypertension     PUD (peptic ulcer disease)     Sciatica     War injury due to shrapnel     leg   ·     Past Surgical History:        Procedure Laterality Date    APPENDECTOMY      [facility]AL [facility]ilateral     [facility]    CORONARY ANGIOPLASTY WITH STENT PLACEMENT      HEMORRHOID SURGERY      HERNIA REPAIR      inguinal    HIP SURGERY Left 8/22/2020    LEFT HIP PERCUTANUOUS PINNING performed by Radha Alexis DO at [address] Left     laser repair   [facility] Sargent  2018    Dr. Brian Choi, Botox   ·     Medications prerenal.   ***     HFpEF  No EF on file, but appeared normal last echo (05/20/2016). Grade I diastolic dysfunction. Echo (08/20) was difficult to assess due to poor visualization.  EF:50%  - strict I/O  - Daily weights as tolerated     Pain management  - Morphine 2mg p2cbyyf PRN  - will adjust as appropriate for patient comfort  ***     HTN  - discontinue home lisinopril due to MULU  ***            Will discuss with attending physician ***  Code Status:Full code  FEN: NPO  PPX: Heparin SubQ  DISPO: TED Heath  8/28/2020,  2:01 PM

## 2020-08-28 NOTE — PROGRESS NOTES
PACU Transfer Note    Vitals:    08/28/20 1700   BP: (!) 162/66   Pulse: 89   Resp: 19   Temp: T98.2   SpO2: 97%   T98.2    In: -   Out: 355 [Urine:350]    Pain assessment: Pain Level: 0    Report given to Receiving unit RN.    8/28/2020 5:27 PM

## 2020-08-28 NOTE — CARE COORDINATION
representative Cee Camarillo and his family were provided with a choice of provider and agrees with the discharge plan Not Indicated    Freedom of choice list was provided with basic dialogue that supports the patient's individualized plan of care/goals and shares the quality data associated with the providers. Not Indicated    Care Transition patient:  Yes    Alvino Biswas, RICHARD, DAW-S  [facility]  Case Management Department  Ph: [phone]

## 2020-08-28 NOTE — H&P
Internal Medicine  PGY 2  History & Physical      CC : Urine retention, blood clots out of penis    History Obtained From:  patient    HISTORY OF PRESENT ILLNESS:  Mr. Yo Vu is a 81 yo male who has a PMHx of CAD, BPH with obstruction, L hip fracture s/p fixation, D-CHF, HTN and achalasia presented to the ED from [facility] for blood clots coming from his penis. Patient was having straight caths q6h but staff were having difficulty placing cath with blood coming out. Chambers catheter was eventually placed which wasn't working properly and it had lots of clots and blood. Patient had limited urine output for the past day. Of note, patient was recently admitted 08/20/20- 08/24/20 for left femoral neck fracture where Fixation was performed and patient was transferred to [facility] [place], operated by [facility] for rehab. He further has episodes of aspiration most likely due to achalasia and he saw his PCP  08/27/20 who started pt on Avelox for aspiration pneumonitis given worsening cough and increased oxygen requirement. At the ED, patient was afebrile, HR 60's, /83 and was sating 95% on room air. Labs revealed Cr 1.4 (baseline 1-1.1), HCO3 17, anion gap 20, ProBNP 2980 (from 1925 8/20). Troponin 0.04. CBC revealed leukocytosis WBC 20.7, Hgb 11.5 (unchanged from 10 days ago). Urology was consulted and patient underwent CT abd/pelvis WO contrast which revealed bladder outlet obstruction 2/2 prostatomegaly with bilateral hydroureter with concern of hemorrhagic mass. Urology took pt to the OR urgently for cystoscopy and urine clot evacutation.      Past Medical History:        Diagnosis Date    Achalasia     Arthritis     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 [facility]     [facility]    CAD (coronary artery disease)     stents x 3 - dr guille Carrera Cervical spondylosis     DVT (deep venous thrombosis) ([facility] [address] 2003    right leg    Hemorrhoids     Hyperlipidemia     Hypertension     PUD (peptic ulcer disease)     reports no history of alcohol use. · DRUGS : none  · Patient currently lives in a nursing home  ·   Family History:   HTN in his father    Review of Systems    ROS: A 10 point review of systems was conducted, significant findings as noted in HPI. Physical Exam  Vitals signs and nursing note reviewed. Constitutional:       Appearance: Normal appearance. Cardiovascular:      Rate and Rhythm: Normal rate and regular rhythm. Pulses: Normal pulses. Heart sounds: Normal heart sounds. No murmur. Pulmonary:      Effort: Pulmonary effort is normal.      Breath sounds: Rales (mild basilar) present. Comments: On room air  Abdominal:      General: Abdomen is flat. Bowel sounds are normal.      Palpations: Abdomen is soft. Tenderness: There is no abdominal tenderness. Musculoskeletal: Normal range of motion. General: No swelling or tenderness. Right lower leg: Edema (1+) present. Left lower leg: Edema (1+) present. Skin:     General: Skin is warm and dry. Capillary Refill: Capillary refill takes less than 2 seconds. Neurological:      Mental Status: He is alert. Comments: Oriented to person and to year only            Vitals:    08/28/20 1700   BP: (!) 162/66   Pulse: 89   Resp: 19   Temp:    SpO2: 97%       DATA:    Labs:  CBC:   Recent Labs     08/28/20  1206   WBC 20.7*   HGB 11.5*   HCT 35.7*          BMP:   Recent Labs     08/28/20  1206      K 3.9      CO2 17*   BUN 60*   CREATININE 1.4*   GLUCOSE 124*     LFT's: No results for input(s): AST, ALT, ALB, BILITOT, ALKPHOS in the last 72 hours. Troponin:   Recent Labs     08/28/20  1206   TROPONINI 0.04*     BNP:No results for input(s): BNP in the last 72 hours. ABGs: No results for input(s): PHART, IXI2IGH, PO2ART in the last 72 hours.   INR:   Recent Labs     08/28/20  1206   INR 1.05       U/A:  Recent Labs     08/28/20  1206   COLORU RED*   PHUR 6.5   WBCUA 3-5   RBCUA >100*   BACTERIA 1+* CLARITYU CLOUDY*   SPECGRAV 1.010   LEUKOCYTESUR MODERATE*   UROBILINOGEN 1.0   BILIRUBINUR Negative   BLOODU LARGE*   GLUCOSEU 100*       CT ABDOMEN PELVIS WO CONTRAST Additional Contrast? None   Final Result   1. Evidence of bladder outlet obstruction secondary to prostatomegaly with consequential bilateral hydroureter. .   2. Hyperattenuated material within the posterior aspect of the urinary bladder is concerning for hemorrhage/mass. Direct visualization can be obtained in proper clinical settings. 3. High-density focus in the midpole of the left kidney. Differentials include high-protein cyst/neoplasm. Ultrasound of the kidney can be obtained. 4. Partially visualized soft tissue mass in the right posterior lateral chest wall, deep and separate from the right latissimus dorsi muscle. XR CHEST PORTABLE   Final Result      Interval clearing of the left lung base since prior study. No acute chest process. ASSESSMENT AND PLAN:  Gross hematuria 2/2 false passage 2/2 prostate enlargement   Hx of BPH with regular straight cath complicated by bleeding likely traumatic. S/p cystoscopy and clot evacutation  - follow up urology recs  - gonzales catheter for at least one week, CBI, wean to clear  - pain management; tramadol 25mg q6h PRN    Aspiration pneumonitis (HCC)  Cough, reported increased oxygen requirement at [facility] but today on RA + leukocytosis + cr finding. Was started on Avelox 400 mg by SNF physician. Improved CXR finding today. - continue Levofloxacin; pharmacy to renally adjust  - continue DuoNeb    MULU  Today Cr 1.4 (baseline 1.0 - 1.1). Likely obstructive given urinary retention. - monitor  - strict intake/output  - hold off diuretics , lisinporol     HFpEF  Unlikely in excerebration. ProBNP elevated 2980 (from 1925 8 days ago) likely 2/2 volume overload caused by urinary retention.  Last ECHO 8/20/2020 EF 50%  - d/c home ACE, diuretics for now given MULU  - strict I/O, daily weights        HTN  BP stable. On home lisinopril.   - will hold off ACE for now  - labetalol PRN for SBP>180     Closed left hip fracture  S/p fixation previous admission  - PT/OT  - tramadol 25 mg q8h for pain     Achalasia  Hx of aspiration, regurgitation. S/p multiple procedures in the past including EGD with Botox injection. - will be followed outpatient  - soft bite diet for now awaiting SLP eval    Leukocytosis  Likely reactive. Lactic acid 1.5. WBC 20.7 from 10.2 7 days ago. Afebrile. No evidence of infection. UA positive nitrite, moderate LE, 1+ bacteria, 3-5 WBC but pt has no sxs. - will monitor for now  - follow up urine cx, blood cx       Urinary retention  2/2 BPH. Has been having straight cath while at SNF.   - increase flomax 0.8 mg per urology res  - start finasteride per urology recs  - plan as above     CAD  No symptoms. Today, troponin 0.04.  - trend troponin x 2  - order ECG     Hypothyroidism  On home synthroid. Last TSH 7.90 7/19/2017  - continue home synthroid    Discussed with attending physician Dr. Scot Magallon    Code Status: Limited code  FEN: soft bite diet; card diet  PPX: heparin SQ  DISPO: Roseanna Villarreal MD  8/28/2020,  5:36 PM    Addendum to Resident H& P/Progress note:  I have personally seen,examined and evaluated the patient.  I have reviewed the current history, physical findings, labs and assessment and plan and agree with note as documented by resident MD ( Jason Chaves)      Radha Nowak MD, [address]

## 2020-08-28 NOTE — TELEPHONE ENCOUNTER
Writer contacted Dr. Kirsty Malik,  ED provider to inform of 30 day readmission risk. ED provider informed writer of readmission.

## 2020-08-28 NOTE — ANESTHESIA PRE PROCEDURE
Department of Anesthesiology  Preprocedure Note       Name:  Vanessa Meade   Age:  80 y.o.  :  1925                                          MRN:  [id number]         Date:  2020      Surgeon: Evangelina Smart):  Fidel Butts MD    Procedure: Procedure(s):  CYSTOSCOPY AND EVACUATION OF CLOTS    Medications prior to admission:   Prior to Admission medications    Medication Sig Start Date End Date Taking? Authorizing Provider   ondansetron (ZOFRAN) 4 MG tablet Take 4 mg by mouth every 6 hours as needed for Nausea or Vomiting    Historical Provider, MD   finasteride (PROSCAR) 5 MG tablet Take 5 mg by mouth daily    Historical Provider, MD   Heparin Sodium, Porcine, (HEPARIN, PORCINE,) 5000 UNIT/ML injection Inject 1 mL into the skin every 8 hours for 28 days 20  Citlaly Crane MD   tamsulosin (FLOMAX) 0.4 MG capsule Take 2 capsules by mouth nightly 20   Citlaly Crane MD   levothyroxine (SYNTHROID) 50 MCG tablet Take 50 mcg by mouth Daily    Historical Provider, MD   torsemide (DEMADEX) 20 MG tablet Take 0.5 tablets by mouth daily 18 MD Annalisa   pantoprazole (PROTONIX) 40 MG tablet Take 1 tablet by mouth 2 times daily 17   Lourdes Lesches, MD   lisinopril (PRINIVIL) 20 MG tablet Take 1 tablet by mouth daily. 14   Lourdes Lesches, MD   clopidogrel (PLAVIX) 75 MG tablet Take 75 mg by mouth daily. Historical Provider, MD   aspirin 81 MG tablet Take 81 mg by mouth daily. Historical Provider, MD   atorvastatin (LIPITOR) 80 MG tablet Take 1 tablet by mouth daily.  10/14/13   Lourdes Lesches, MD       Current medications:    Current Facility-Administered Medications   Medication Dose Route Frequency Provider Last Rate Last Dose    0.9 % sodium chloride bolus  1,000 mL Intravenous Once Zayra Cosme MD 1,000 mL/hr at 20 1250 1,000 mL at 20 1250     Current Outpatient Medications   Medication Sig Dispense Refill    ondansetron (ZOFRAN) 4 MG tablet Take 4 mg by mouth every 6 hours as needed for Nausea or Vomiting      finasteride (PROSCAR) 5 MG tablet Take 5 mg by mouth daily      Heparin Sodium, Porcine, (HEPARIN, PORCINE,) 5000 UNIT/ML injection Inject 1 mL into the skin every 8 hours for 28 days 84 mL 0    tamsulosin (FLOMAX) 0.4 MG capsule Take 2 capsules by mouth nightly 30 capsule 3    levothyroxine (SYNTHROID) 50 MCG tablet Take 50 mcg by mouth Daily      torsemide (DEMADEX) 20 MG tablet Take 0.5 tablets by mouth daily 30 tablet 3    pantoprazole (PROTONIX) 40 MG tablet Take 1 tablet by mouth 2 times daily 60 tablet 3    lisinopril (PRINIVIL) 20 MG tablet Take 1 tablet by mouth daily. 30 tablet 3    clopidogrel (PLAVIX) 75 MG tablet Take 75 mg by mouth daily.  aspirin 81 MG tablet Take 81 mg by mouth daily.  atorvastatin (LIPITOR) 80 MG tablet Take 1 tablet by mouth daily. 30 tablet 11       Allergies:  No Known Allergies    Problem List:    Patient Active Problem List   Diagnosis Code    Hypertension I10    Hyperlipidemia E78.5    CAD (coronary artery disease) I25.10    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 symptoms N40.1, N13.8    Arthritis M19.90    Vitamin D deficiency E55.9    Anemia D64.9    Hypothyroid E03.9    Benign nodular prostatic hyperplasia with lower urinary tract symptoms N40.1    Benign essential HTN I10    Vertigo R42    Ataxia R27.0    Bilateral carotid artery disease (HCC) I73.9    Dyspepsia and disorder of function of stomach K31.9, R10.13    Rash and nonspecific skin eruption R21    Closed left hip fracture, initial encounter [facility]) S72.002A    Fall W19. XXXA    Chronic diastolic CHF (congestive heart failure) (HCC) I50.32    Bilateral lower extremity edema R60.0    MULU (acute kidney injury) ([facility] [address] N17.9    Hyponatremia E87.1    Achalasia K22.0    Cervical spondylosis M47.812    Multiple falls R29.6    Acute respiratory failure with hypoxia ([facility]) J96.01    Urinary retention R33.9    Aspiration pneumonitis (HCC) J69.0    Oropharyngeal dysphagia R13.12       Past Medical History:        Diagnosis Date    Achalasia     Arthritis     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     [facility]    CAD (coronary artery disease)     stents x 3 - dr guille Arguello Cervical spondylosis     DVT (deep venous thrombosis) ([facility] [address] 2003    right leg    Hemorrhoids     Hyperlipidemia     Hypertension     PUD (peptic ulcer disease)     Sciatica     War injury due to shrapnel     leg       Past Surgical History:        Procedure Laterality Date    APPENDECTOMY      [facility]ACT [facility]OVAL [facility]ilateral     [facility]    CORONARY ANGIOPLASTY WITH STENT PLACEMENT      HEMORRHOID SURGERY      HERNIA REPAIR      inguinal    HIP SURGERY Left 8/22/2020    LEFT HIP PERCUTANUOUS PINNING performed by Radha Alexis DO at [address] Left     laser repair   [address]  2018    Dr. Mulligan, Botox       Social History:    Social History     Tobacco Use    Smoking status: Never Smoker    Smokeless tobacco: Never Used   Substance Use Topics    Alcohol use: No                                Counseling given: Not Answered      Vital Signs (Current):   Vitals:    08/28/20 1030 08/28/20 1045 08/28/20 1100 08/28/20 1115   BP: (!) 180/96  (!) 170/74    Pulse:       Resp:       Temp:       SpO2: 99% 99% 100% 98%   Weight:       Height:                                                  BP Readings from Last 3 Encounters:   08/28/20 (!) 170/74   08/27/20 (!) 158/60   08/26/20 133/78       NPO Status:                                                                                 BMI:   Wt Readings from Last 3 Encounters:   08/28/20 184 lb (83.5 kg)   08/26/20 179 lb (81.2 kg)   08/22/20 186 lb 11.2 oz (84.7 kg)     Body mass index is 23.62 kg/m².     CBC:   Lab Results   Component Value Date    WBC 20.7 08/28/2020    RBC 3.62 08/28/2020    HGB 11.5 08/28/2020    HCT 35.7 08/28/2020    MCV 98.5 08/28/2020    RDW 13.5 08/28/2020     08/28/2020       CMP:   Lab Results   Component Value Date     08/28/2020    K 3.9 08/28/2020     08/28/2020    CO2 17 08/28/2020    BUN 60 08/28/2020    CREATININE 1.4 08/28/2020    GFRAA 57 08/28/2020    GFRAA >60 06/08/2012    AGRATIO 2.0 07/19/2017    LABGLOM 47 08/28/2020    GLUCOSE 124 08/28/2020    PROT 6.5 07/19/2017    PROT 6.8 06/08/2012    CALCIUM 8.7 08/28/2020    BILITOT 0.7 07/19/2017    ALKPHOS 157 07/19/2017    AST 97 07/19/2017     07/19/2017       POC Tests: No results for input(s): POCGLU, POCNA, POCK, POCCL, POCBUN, POCHEMO, POCHCT in the last 72 hours. Coags:   Lab Results   Component Value Date    PROTIME 12.2 08/28/2020    INR 1.05 08/28/2020       HCG (If Applicable): No results found for: PREGTESTUR, PREGSERUM, HCG, HCGQUANT     ABGs: No results found for: PHART, PO2ART, RAH6GNV, TCB7GRF, BEART, E1YPDMTA     Type & Screen (If Applicable):  No results found for: LABABO, LABRH    Drug/Infectious Status (If Applicable):  No results found for: HIV, HEPCAB    COVID-19 Screening (If Applicable):   Lab Results   Component Value Date    COVID19 Not Detected 08/21/2020         Anesthesia Evaluation  Patient summary reviewed and Nursing notes reviewed  Airway: Mallampati: II  TM distance: >3 FB   Neck ROM: full  Mouth opening: > = 3 FB Dental: normal exam         Pulmonary:Negative Pulmonary ROS and normal exam  breath sounds clear to auscultation            Patient did not smoke on day of surgery. Cardiovascular:    (+) hypertension: mild, CAD: no interval change, CHF: no interval change,       ECG reviewed  Rhythm: regular  Rate: normal           Beta Blocker:  Dose within 24 Hrs         Neuro/Psych:   (+) neuromuscular disease:,             GI/Hepatic/Renal:   (+) PUD,           Endo/Other:    (+) hypothyroidism::., .                 Abdominal:       Abdomen: tender. Vascular: negative vascular ROS. Anesthesia Plan      general     ASA 4 - emergent       Induction: intravenous. MIPS: Postoperative opioids intended and Prophylactic antiemetics administered. Anesthetic plan and risks discussed with patient. Use of blood products discussed with patient whom consented to blood products. Plan discussed with attending and CRNA.     Attending anesthesiologist reviewed and agrees with Pre Eval content            Jamie Diaz DO   8/28/2020

## 2020-08-28 NOTE — PROGRESS NOTES
4 Eyes Admission Assessment     I agree as the admission nurse that 2 RN's have performed a thorough Head to Toe Skin Assessment on the patient. ALL assessment sites listed below have been assessed on admission. Areas assessed by both nurses: ***  [x]   Head, Face, and Ears   [x]   Shoulders, Back, and Chest  [x]   Arms, Elbows, and Hands   [x]   Coccyx, Sacrum, and Ischium  [x]   Legs, Feet, and Heels        Does the Patient have Skin Breakdown?   No         Eriberto Prevention initiated:  NO  Wound Care Orders initiated:  NO      [facility] nurse consulted for Pressure Injury (Stage 3,4, Unstageable, DTI, NWPT, and Complex wounds) or Eriberto score 18 or lower:  NO      Nurse 1 eSignature: Electronically signed by Kaylan Rosenthal RN on 8/28/20 at 7:01 PM EDT    **SHARE this note so that the co-signing nurse is able to place an eSignature**    Nurse 2 eSignature: {Esignature:[id number]}

## 2020-08-28 NOTE — PROGRESS NOTES
RESPIRATORY THERAPY ASSESSMENT    Name:  Gokul Anderson  Medical Record Number:  [id number]  Age: 80 y.o. Gender: male  : 1925  Today's Date:  2020  Room:  53/5310-01    Assessment     Is the patient being admitted for a COPD or Asthma exacerbation? No   (If yes the patient will be seen every 4 hours for the first 24 hours and then reassessed)    Patient Admission Diagnosis: Hematuria, Acute Renal Falure    Allergies  No Known Allergies        Pulmonary History:Pneumonia  Home Oxygen Therapy:  room air  Home Respiratory Therapy:Albuterol/Ipratropium Bromide HHN   Current Respiratory Therapy:            Respiratory Severity Index(RSI)   Patients with orders for inhalation medications, oxygen, or any therapeutic treatment modality will be placed on Respiratory Protocol. They will be assessed with the first treatment and at least every 72 hours thereafter. The following severity scale will be used to determine frequency of treatment intervention.     Smoking History: No Smoking History = 0    Social History  Social History     Tobacco Use    Smoking status: Never Smoker    Smokeless tobacco: Never Used   Substance Use Topics    Alcohol use: No    Drug use: No       Recent Surgical History: None = 0  Past Surgical History  Past Surgical History:   Procedure Laterality Date    APPENDECTOMY      [facility]    CORONARY ANGIOPLASTY WITH STENT PLACEMENT      HEMORRHOID SURGERY      HERNIA REPAIR      inguinal    HIP SURGERY Left 2020    LEFT HIP PERCUTANUOUS PINNING performed by Radha Alexis DO at [address] Left     laser repair   Oneida Sargent  2018    Ivan Lopez       Level of Consciousness: Alert, Oriented, and Cooperative = 0    Level of Activity: Mostly sedentary, minimal walking = 2    Respiratory Pattern: Regular Pattern; RR 8-20 = 0    Breath Sounds: Diminshed = 2    Sputum   ,  ,    Cough: Strong, spontaneous, non-productive = 0    Vital Signs   BP (!) 170/73   Pulse 87   Temp 98.2 °F (36.8 °C) (Oral)   Resp 17   Ht 6' 2\" (1.88 m)   Wt 184 lb (83.5 kg)   SpO2 97%   BMI 23.62 kg/m²   SPO2 (COPD values may differ): Greater than or equal to 92% on room air = 0    Peak Flow (asthma only): not applicable = 0    RSI: 0-4 = See once and convert to home regimen or discontinue        Plan       Goals: medication delivery    Patient/caregiver was educated on the proper method of use for Respiratory Care Devices:  Yes      Level of patient/caregiver understanding able to:   ? Verbalize understanding   ? Demonstrate understanding       ? Teach back        ? Needs reinforcement       ? No available caregiver               ? Other:     Response to education:  Good     Is patient being placed on Home Treatment Regimen? Yes     Does the patient have everything they need prior to discharge? NA     Comments: Pt uses Duoneb HHN Q6 PRN at home    Plan of Care:Cont HHN with Duoneb Q6 prn  Electronically signed by Amanuel Johnson RCP on 8/28/2020 at 6:27 PM    Respiratory Protocol Guidelines     1. Assessment and treatment by Respiratory Therapy will be initiated for medication and therapeutic interventions upon initiation of aerosolized medication. 2. Physician will be contacted for respiratory rate (RR) greater than 35 breaths per minute. Therapy will be held for heart rate (HR) greater than 140 beats per minute, pending direction from physician. 3. Bronchodilators will be administered via Metered Dose Inhaler (MDI) with spacer when the following criteria are met:  a. Alert and cooperative     b. HR < 140 bpm  c. RR < 30 bpm                d. Can demonstrate a 2-3 second inspiratory hold  4. Bronchodilators will be administered via Hand Held Nebulizer JOHN [facility]) to patients when ANY of the following criteria are met  a. Incognizant or uncooperative          b. Patients treated with HHN at Home        c.  Unable to demonstrate proper use of MDI with spacer     d. RR > 30 bpm   5. Bronchodilators will be delivered via Metered Dose Inhaler (MDI), HHN, Aerogen to intubated patients on mechanical ventilation. 6. Inhalation medication orders will be delivered and/or substituted as outlined below. Aerosolized Medications Ordering and Administration Guidelines:    1. All Medications will be ordered by a physician, and their frequency and/or modality will be adjusted as defined by the patients Respiratory Severity Index (RSI) score. 2. If the patient does not have documented COPD, consider discontinuing anticholinergics when RSI is less than 9.  3. If the bronchospasm worsens (increased RSI), then the bronchodilator frequency can be increased to a maximum of every 4 hours. If greater than every 4 hours is required, the physician will be contacted. 4. If the bronchospasm improves, the frequency of the bronchodilator can be decreased, based on the patient's RSI, but not less than home treatment regimen frequency. 5. Bronchodilator(s) will be discontinued if patient has a RSI less than 9 and has received no scheduled or as needed treatment for 72  Hrs. Patients Ordered on a Mucolytic Agent:    1. Must always be administered with a bronchodilator. 2. Discontinue if patient experiences worsened bronchospasm, or secretions have lessened to the point that the patient is able to clear them with a cough. Anti-inflammatory and Combination Medications:    1. If the patient lacks prior history of lung disease, is not using inhaled anti-inflammatory medication at home, and lacks wheezing by examination or by history for at least 24 hours, contact physician for possible discontinuation.

## 2020-08-28 NOTE — PROGRESS NOTES
Home Med List is complete. Source of medications in list is [facility] paperwork. Please note: Admission orders already completed. Changes made to Home Med List:   Added to the Home Med List:    Moxifloxacin 400mg PO daily starting 8/27-9/3.    Tramadol 50mg PO q6h prn   Duonebs q6h and prn    Changes made to the Home Med List:     Tamsulosin 0.4mg QHS (not 0.8mg QHS)   Pantoprazole 40mg daily (not BID)       Kathaleen Ormond PharmD., BCPS   8/28/2020 4:21 PM  Wireless: [phone]

## 2020-08-28 NOTE — BRIEF OP NOTE
Brief Postoperative Note      Patient: Nohemi Damian  YOB: 1925  MRN: [id number]    Date of Procedure: 8/28/2020    Pre-Op Diagnosis: HEMATURIA    Post-Op Diagnosis: false passage, bph, hematuria       Procedure(s):  CYSTOSCOPY AND EVACUATION OF CLOTS    Surgeon(s):  Desmond Vazquez MD    Assistant:  * No surgical staff found *    Anesthesia: General    Estimated Blood Loss (mL): Minimal    Complications: None    Specimens:   * No specimens in log *    Implants:  * No implants in log *      Drains:   Urethral Catheter Triple-lumen 24 fr (Active)       [REMOVED] Urethral Catheter (Removed)   Catheter Indications Acute urinary retention/obstruction 08/23/20 0300   Securement Device Date Changed 08/22/20 08/22/20 1026   Site Assessment No urethral drainage 08/23/20 0300   Urine Color Thuy 08/23/20 0300   Urine Appearance Clear 08/23/20 0300   Output (mL) 400 mL 08/22/20 2100       Findings: HUGE PROSTATE, DAN IN PROSTATIC URETHRA    ABOUT 1.5 TO 2L OF GROSSLY BLOODY URINE DRAINED FROM BLADDER. HE HAD A FALSE PASSAGE D/T LARGE SIZE OF PROSTATE. PLAN: DAN FOR AT LEAST A WEEK. CBI, WEAN TO CLEAR.     Electronically signed by David Fink MD on 8/28/2020 at 3:34 PM
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
## 2020-08-28 NOTE — ANESTHESIA POSTPROCEDURE EVALUATION
Department of Anesthesiology  Postprocedure Note    Patient: Bill Bernard  MRN: [id number]  YOB: 1925  Date of evaluation: 8/28/2020  Time:  3:12 PM     Procedure Summary     Date:  08/28/20 Room / Location:  [address] [place] [address]    Anesthesia Start:  3184 Anesthesia Stop:      Procedure:  CYSTOSCOPY AND EVACUATION OF CLOTS (N/A ) Diagnosis:  (HEMATURIA)    Surgeon:  Craig Vickers MD Responsible Provider:  Pato Rm DO    Anesthesia Type:  general ASA Status:  4 - Emergent          Anesthesia Type: general    Maria Esther Phase I:      Maria Esther Phase II:      Last vitals: Reviewed and per EMR flowsheets.        Anesthesia Post Evaluation    Patient location during evaluation: PACU  Patient participation: complete - patient participated  Level of consciousness: awake  Pain score: 0  Airway patency: patent  Nausea & Vomiting: no nausea and no vomiting  Complications: no  Cardiovascular status: blood pressure returned to baseline  Respiratory status: acceptable  Hydration status: euvolemic

## 2020-08-28 NOTE — ED NOTES
Bed: A11-11  Expected date:   Expected time:   Means of arrival:   Comments:  [facility]     Balwinder Chase RN  08/28/20 7233

## 2020-08-29 LAB
ALBUMIN SERPL-MCNC: 2.8 G/DL (ref 3.4–5)
ANION GAP SERPL CALCULATED.3IONS-SCNC: 10 MMOL/L (ref 3–16)
BASOPHILS ABSOLUTE: 0 K/UL (ref 0–0.2)
BASOPHILS RELATIVE PERCENT: 0.3 %
BUN BLDV-MCNC: 55 MG/DL (ref 7–20)
CALCIUM SERPL-MCNC: 8.1 MG/DL (ref 8.3–10.6)
CHLORIDE BLD-SCNC: 111 MMOL/L (ref 99–110)
CO2: 24 MMOL/L (ref 21–32)
CREAT SERPL-MCNC: 1.3 MG/DL (ref 0.8–1.3)
EOSINOPHILS ABSOLUTE: 0 K/UL (ref 0–0.6)
EOSINOPHILS RELATIVE PERCENT: 0.1 %
GFR AFRICAN AMERICAN: >60
GFR NON-AFRICAN AMERICAN: 51
GLUCOSE BLD-MCNC: 123 MG/DL (ref 70–99)
HCT VFR BLD CALC: 28.8 % (ref 40.5–52.5)
HEMOGLOBIN: 9.8 G/DL (ref 13.5–17.5)
LYMPHOCYTES ABSOLUTE: 7.4 K/UL (ref 1–5.1)
LYMPHOCYTES RELATIVE PERCENT: 48.8 %
MAGNESIUM: 2.9 MG/DL (ref 1.8–2.4)
MCH RBC QN AUTO: 32.4 PG (ref 26–34)
MCHC RBC AUTO-ENTMCNC: 33.9 G/DL (ref 31–36)
MCV RBC AUTO: 95.6 FL (ref 80–100)
MONOCYTES ABSOLUTE: 0.5 K/UL (ref 0–1.3)
MONOCYTES RELATIVE PERCENT: 3.2 %
NEUTROPHILS ABSOLUTE: 7.2 K/UL (ref 1.7–7.7)
NEUTROPHILS RELATIVE PERCENT: 47.6 %
PDW BLD-RTO: 13.5 % (ref 12.4–15.4)
PHOSPHORUS: 4.4 MG/DL (ref 2.5–4.9)
PLATELET # BLD: 218 K/UL (ref 135–450)
PMV BLD AUTO: 8.9 FL (ref 5–10.5)
POTASSIUM SERPL-SCNC: 4.1 MMOL/L (ref 3.5–5.1)
RBC # BLD: 3.01 M/UL (ref 4.2–5.9)
SODIUM BLD-SCNC: 145 MMOL/L (ref 136–145)
TROPONIN: 0.03 NG/ML
URINE CULTURE, ROUTINE: NORMAL
WBC # BLD: 15.2 K/UL (ref 4–11)

## 2020-08-29 PROCEDURE — 36415 COLL VENOUS BLD VENIPUNCTURE: CPT

## 2020-08-29 PROCEDURE — 92610 EVALUATE SWALLOWING FUNCTION: CPT

## 2020-08-29 PROCEDURE — 2580000003 HC RX 258: Performed by: STUDENT IN AN ORGANIZED HEALTH CARE EDUCATION/TRAINING PROGRAM

## 2020-08-29 PROCEDURE — 99232 SBSQ HOSP IP/OBS MODERATE 35: CPT | Performed by: HOSPITALIST

## 2020-08-29 PROCEDURE — 6370000000 HC RX 637 (ALT 250 FOR IP): Performed by: STUDENT IN AN ORGANIZED HEALTH CARE EDUCATION/TRAINING PROGRAM

## 2020-08-29 PROCEDURE — 6370000000 HC RX 637 (ALT 250 FOR IP): Performed by: UROLOGY

## 2020-08-29 PROCEDURE — 93005 ELECTROCARDIOGRAM TRACING: CPT

## 2020-08-29 PROCEDURE — 83735 ASSAY OF MAGNESIUM: CPT

## 2020-08-29 PROCEDURE — 6360000002 HC RX W HCPCS: Performed by: UROLOGY

## 2020-08-29 PROCEDURE — 85025 COMPLETE CBC W/AUTO DIFF WBC: CPT

## 2020-08-29 PROCEDURE — 80069 RENAL FUNCTION PANEL: CPT

## 2020-08-29 PROCEDURE — 84484 ASSAY OF TROPONIN QUANT: CPT

## 2020-08-29 PROCEDURE — 1200000000 HC SEMI PRIVATE

## 2020-08-29 RX ORDER — METOPROLOL TARTRATE 50 MG/1
25 TABLET, FILM COATED ORAL 2 TIMES DAILY
Status: DISCONTINUED | OUTPATIENT
Start: 2020-08-29 | End: 2020-09-02 | Stop reason: HOSPADM

## 2020-08-29 RX ADMIN — HEPARIN SODIUM 5000 UNITS: 5000 INJECTION INTRAVENOUS; SUBCUTANEOUS at 22:55

## 2020-08-29 RX ADMIN — LEVOTHYROXINE SODIUM 50 MCG: 50 TABLET ORAL at 06:02

## 2020-08-29 RX ADMIN — ASPIRIN 81 MG: 81 TABLET, COATED ORAL at 09:30

## 2020-08-29 RX ADMIN — HEPARIN SODIUM 5000 UNITS: 5000 INJECTION INTRAVENOUS; SUBCUTANEOUS at 15:48

## 2020-08-29 RX ADMIN — Medication 10 ML: at 22:00

## 2020-08-29 RX ADMIN — METOPROLOL TARTRATE 25 MG: 50 TABLET, FILM COATED ORAL at 09:31

## 2020-08-29 RX ADMIN — LEVOFLOXACIN 750 MG: 750 TABLET, FILM COATED ORAL at 09:30

## 2020-08-29 RX ADMIN — HEPARIN SODIUM 5000 UNITS: 5000 INJECTION INTRAVENOUS; SUBCUTANEOUS at 06:02

## 2020-08-29 RX ADMIN — Medication 10 ML: at 09:31

## 2020-08-29 RX ADMIN — FINASTERIDE 5 MG: 5 TABLET, FILM COATED ORAL at 09:30

## 2020-08-29 RX ADMIN — PANTOPRAZOLE SODIUM 40 MG: 40 TABLET, DELAYED RELEASE ORAL at 09:30

## 2020-08-29 ASSESSMENT — PAIN SCALES - GENERAL
PAINLEVEL_OUTOF10: 0

## 2020-08-29 NOTE — PLAN OF CARE
Problem: Falls - Risk of:  Goal: Will remain free from falls  Description: Will remain free from falls  8/29/2020 1426 by Flaco Laguerre RN  Note: Patient has remained in bed due to weakness on left side and CBI running. Patient getting turned every 2 hours. Will continue to monitor. Problem: Urinary Elimination:  Goal: Ability to achieve a balanced intake and output will improve  Description: Ability to achieve a balanced intake and output will improve  8/29/2020 1426 by Flaco Laguerre RN  Note: Patient has CBI running and urine is beginning to become more clear and less bloody and clot free. Will continue to monitor.

## 2020-08-29 NOTE — PLAN OF CARE
Problem: Falls - Risk of:  Goal: Will remain free from falls  Description: Will remain free from falls  Outcome: Ongoing  Note: Patient remains free from physical injury. Fall precautions in place: Patient in bed lowest position,wheels locked. 2/4 side rails up Call light and beside table within reach. Will continue to monitor       Problem: Urinary Elimination:  Goal: Ability to achieve a balanced intake and output will improve  Description: Ability to achieve a balanced intake and output will improve  Outcome: Ongoing  Note: CBI in place ,pink red cherry urine drained .  Will continue to monitor

## 2020-08-29 NOTE — DISCHARGE SUMMARY
[address] SUMMARY    Patient ID: Kathy Mackenzie                                             Discharge Date: 9/2/2020   Patient's PCP: Nga Wahl MD                                          Discharge Physician: Lyndsay Vazquez MD  Admit Date: 8/28/2020   Admitting Physician: Lyndsay Vazquez MD    DISCHARGE DIAGNOSES:  1. Gross hematuria 2/2 false passage in setting of prostate enlargement  2. Aspiration pneumonitis  3. Acute kidney injury  4. Heart failure with preserved ejection fraction  5. Hypertension  6. Closed hip fracture  7. Achalasia  8. Coronary artery disease   9. Hypothyroidism  10. Oropharyngeal dysphagia     Hospital Course:    Mr. Hue Hamilton is a 77-year-old male with a medical history of   CAD, BPH with obstruction, left hip fraction s/p fixation, hypertension, HFpEF, and achalasia presented to the ED from [facility] for blood clots coming from his urethra. He was having straight catheters every six hours but had difficulty placing any further catheters due to blood. Chambers catheter was eventually placed with limited urine produced. In ED, patient was hypertensive 153/83, elevated Cr 1.4 (baseline 1.1), leukocytosis 20.7, and anion gap of 20. CT abdomen/pelvis revealed bladder outlet obstruction secondary to  prostatomegaly with bilateral hydroureter with concern of hemorrhagic mass. Patient was taken to cystoscopy and urine clot evacuation. He was then placed on continuous bladder irrigation. GI was consulted for patient's history of achalasia. Patient underwent EGD with Botox injections. There were no complications and minimal blood loss in both procedures. Patient's hematuria and achalasia has since improved and acute kidney injury has resolved with given fluids. Patient will be discharged to SNF on puree/thin fluids and will follow up with GI (Dr. Sarkis Rand) in 3 months for further evaluation.     Physical Exam:  /68   Pulse 56 Temp 97.6 °F (36.4 °C) (Oral)   Resp 16   Ht 6' 2\" (1.88 m)   Wt 191 lb 12.8 oz (87 kg)   SpO2 95%   BMI 24.63 kg/m²     /68   Pulse 56   Temp 97.6 °F (36.4 °C) (Oral)   Resp 16   Ht 6' 2\" (1.88 m)   Wt 191 lb 12.8 oz (87 kg)   SpO2 95%   BMI 24.63 kg/m²     General Appearance:    Alert, cooperative, no distress, appears stated age   Head:    Normocephalic, without obvious abnormality, atraumatic   Eyes:    PERRL, conjunctiva/corneas clear, EOM's intact, fundi     benign, both eyes        Ears:    Normal TM's and external ear canals, both ears   Nose:   Nares normal, septum midline, mucosa normal, no drainage    or sinus tenderness   Throat:   Lips, mucosa, and tongue normal; teeth and gums normal   Neck:   Supple, symmetrical, trachea midline, no adenopathy;        thyroid:  No enlargement/tenderness/nodules; no carotid    bruit or JVD   Back:     Symmetric, no curvature, ROM normal, no CVA tenderness   Lungs:     Clear to auscultation bilaterally, respirations unlabored   Chest wall:    No tenderness or deformity   Heart:    Regular rate and rhythm, S1 and S2 normal, no murmur, rub   or gallop   Abdomen:     Soft, non-tender, bowel sounds active all four quadrants,     no masses, no organomegaly   Genitalia:    Normal male without lesion, discharge or tenderness   Rectal:    Normal tone, normal prostate, no masses or tenderness;    guaiac negative stool   Extremities:   Extremities normal, atraumatic, no cyanosis or edema   Pulses:   2+ and symmetric all extremities   Skin:   Skin color, texture, turgor normal, no rashes or lesions   Lymph nodes:   Cervical, supraclavicular, and axillary nodes normal   Neurologic:   CNII-XII intact.  Normal strength, sensation and reflexes       throughout       Consults: urology  Significant Diagnostic Studies:   CT HIP LEFT WO CONTRAST   Final Result   Nondisplaced impacted subcapital left femoral neck fracture.       XR FEMUR LEFT (MIN 2 VIEWS)   Final Result       1. No acute osseous abnormality. 2. Mild left hip arthritis. 3. Moderate vascular calcifications.       XR PELVIS (1-2 VIEWS)   Final Result       Limited evaluation the pelvis as described. There is possible mild impaction fracture left femoral neck. Dedicated images recommended.       XR CHEST PORTABLE   Final Result       Left lower lung opacity obscuring the left hemidiaphragm may relate to atelectasis and/or consolidation.       CT CERVICAL SPINE WO CONTRAST   Final Result   1. No evidence of an acute fracture or dislocation in the cervical spine. 2. Severe spondylosis contributing to multiple levels of severe foraminal narrowing and mild to moderate central canal stenosis. 3. 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    4. Profoundly dilated esophagus fluid and debris within the lumen compatible with the patient's history of achalasia.       CT HEAD WO CONTRAST   Final Result       No acute hemorrhage     Treatments: CBI, therapies: PT  Disposition: SNF  Discharged Condition: Stable  Follow Up: Primary Care Physician in three months    DISCHARGE MEDICATION:     Medication List      START taking these medications    levoFLOXacin 250 MG tablet  Commonly known as:  LEVAQUIN  Take 1 tablet by mouth daily for 3 days        CONTINUE taking these medications    aspirin 81 MG tablet     atorvastatin 80 MG tablet  Commonly known as:  LIPITOR     clopidogrel 75 MG tablet  Commonly known as:  PLAVIX     finasteride 5 MG tablet  Commonly known as:  PROSCAR     heparin (porcine) 5000 UNIT/ML injection  Inject 1 mL into the skin every 8 hours for 28 days     * ipratropium-albuterol 0.5-2.5 (3) MG/3ML Soln nebulizer solution  Commonly known as:  DUONEB     * ipratropium-albuterol 0.5-2.5 (3) MG/3ML Soln nebulizer solution  Commonly known as:  DUONEB     levothyroxine 50 MCG tablet  Commonly known as: SYNTHROID     lisinopril 20 MG tablet  Commonly known as:  PRINIVIL;ZESTRIL     moxifloxacin 400 MG tablet  Commonly known as:  AVELOX     ondansetron 4 MG tablet  Commonly known as:  ZOFRAN     pantoprazole 40 MG tablet  Commonly known as:  PROTONIX     tamsulosin 0.4 MG capsule  Commonly known as:  FLOMAX     torsemide 10 MG tablet  Commonly known as:  DEMADEX     traMADol 50 MG tablet  Commonly known as:  ULTRAM         * This list has 2 medication(s) that are the same as other medications prescribed for you. Read the directions carefully, and ask your doctor or other care provider to review them with you. Where to Get Your Medications      These medications were sent to [facility] [place], [address] [place] [address] [facility] [phone]  [address] [place] [address]    Phone:  [phone]   · levoFLOXacin 250 MG tablet       Activity: activity as tolerated  Diet: cardiac diet  Wound Care: none needed    Time Spent on discharge is more than 45 minutes    Signed:  Magi Crow MD   PGY1   9/2/2020     Addendum to Resident H& P/Progress note:  I have personally seen,examined and evaluated the patient.  I have reviewed the current history, physical findings, labs and assessment and plan and agree with note as documented by resident MD ( Melinda Ocasio)      Apollo Ocasio MD, Robinson Esteves

## 2020-08-29 NOTE — OP NOTE
[address], [address]                                OPERATIVE REPORT    PATIENT NAME: Yemi Kendall                   :        1925  MED REC NO:   [id number]                          ROOM:       8950  ACCOUNT NO:   [de-identified]                           ADMIT DATE: 2020  PROVIDER:     Merritt Alba MD    DATE OF PROCEDURE:  2020    PREOPERATIVE DIAGNOSIS:  Gross hematuria, clot retention, BPH. POSTOPERATIVE DIAGNOSIS:  Gross hematuria, clot retention, BPH, plus  false passage. PROCEDURE PERFORMED:  Cystoscopy, clot evacuation, and placement of a  24-Latvian three-way catheter. SURGEON:  Merritt Alba MD    INDICATION FOR PROCEDURE:  The patient is a pleasant 80year-old  gentleman that presented with retention. He has been undergoing  intermittent catheterization by the nurses at his nursing facility. He  comes with significant hematuria.  was consulted. On exam, his  bladder was distended past his umbilicus and he is in severe pain. A  Chambers catheter was in place that we were able to irrigate, but it was  clearly not past the prostatic urethra raising the possibility of a  false passage. Therefore, after calling each one of his sons including  Clifton and discussion with the patient, we rushed to surgery for  emergent clot evacuation. DESCRIPTION OF PROCEDURE:  After an informed consent, the patient was  taken to the operating room. He was prepped and draped in sterile  fashion, given a dose of preoperative antibiotics. I inserted the rigid  scope into the penis. Visualization is poor due to the hematuria. I  can see that he has a very large prostate. There is definitely a false  passage. I am able to back the camera up and look upward and  thankfully, I am able to get into the bladder. Once I am in the  bladder, it is completely full of clots.   I then used the [facility] device  to evacuate all the clots out of the bladder. It is clear to me that  the likely cause of bleeding is trauma from a Chambers due to his BPH. Therefore, at this time I take a catheter guide after the urine is  completely clear and I attempt to place a 24-Danish three-way catheter,  this will not go. Therefore, we turn this into a coude catheter and  then I looked back into the camera and placed a super stiff wire into  the bladder and over top of the wire, I placed a 24-Danish three-way  catheter. The wire was removed and the urine remains light pink. As a  precaution, I placed 30 mL in the balloon and then started him on  continuous bladder irrigation. The patient tolerated this procedure  well. ESTIMATED BLOOD LOSS:  Less than 50 mL. COMPLICATIONS:  None. ANESTHESIA:  General endotracheal.    PLAN:  We will leave the catheter in for at least a week. He is going  to be admitted and we will keep him on CBI overnight and hopefully will  be able to wean this in the morning.         Cuca Garcia MD    D: 08/28/2020 15:50:49       T: 08/28/2020 17:17:13     [email or web address]  Job#: [id number]     Doc#: [id number]    CC:

## 2020-08-29 NOTE — PROGRESS NOTES
Patient alert and oriented. Patient denies any pain. BP elevated Metoprol 5mg iv given per Dr Moshe Laguerre order. CBI in place Patient in bed lowest position call light and bedside table within reach. All needs are met at this time. Patient aware to call if any help needed. Will continue to monitor  BP (!) 158/61   Pulse 99   Temp 98 °F (36.7 °C) (Oral)   Resp 16   Ht 6' 2\" (1.88 m)   Wt 184 lb (83.5 kg)   SpO2 95%   BMI 23.62 kg/m²

## 2020-08-29 NOTE — PROGRESS NOTES
Difficulty   Signs & Symptoms: NPO status due to medical condition    NUTRITION INTERVENTION  Food and/or Nutrient Delivery:Continue NPO or Start Tube Feeding   Nutrition education/counseling/coordination of care: Continue Inpatient Monitoring , Speech Therapy or Swallow Evaluation     NUTRITION MONITORING & EVALUATION:  Evaluation:Goals set   Goals:Goals: Pt will tolerate the most appropriate form of nutrition therapy this admission   Monitoring: Chewing/Swallowing , Nutrition Progression  or Weight      OBJECTIVE DATA:  · Nutrition-Focused Physical Findings: +1 pitting BLE edema, no BM   · Wounds Surgical Wound      Past Medical History:   Diagnosis Date    Achalasia     Arthritis     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 [facility]     [facility]    CAD (coronary artery disease)     stents x 3 - dr guille Arguello Cervical spondylosis     DVT (deep venous thrombosis) ([facility] [address] 2003    right leg    Hemorrhoids     Hyperlipidemia     Hypertension     PUD (peptic ulcer disease)     Sciatica     War injury due to shrapnel     leg        ANTHROPOMETRICS  Current Height: 6' 2\" (188 cm)  Current Weight: 184 lb (83.5 kg)    Admission weight: 184 lb (83.5 kg)  Ideal Bodyweight 190 lb   Weight Changes no significant changes noted        BMI BMI (Calculated): 23.7    Wt Readings from Last 50 Encounters:   08/28/20 184 lb (83.5 kg)   08/26/20 179 lb (81.2 kg)   08/22/20 186 lb 11.2 oz (84.7 kg)     COMPARATIVE STANDARDS  Estimated Total Kcals/Day : 24-28 Current Bodyweight (84 kg) 8418-1759 kcal    Estimated Total Protein (g/day) : 1-1.3 Current Bodyweight (84 kg)  g/day  Estimated Daily Total Fluid (ml/day): 1 mL/kcal per day     Food / Nutrition-Related History  Pre-Admission / Home Diet:  Pre-Admission/Home Diet: NPO   Home Supplements / Herbals:    none noted  Food Restrictions / Cultural Requests:    none noted    Diet Orders / Intake / Nutrition Support  Current diet/supplement order: Diet NPO Effective Now Exceptions are: Ice Chips     NSG Recorded PO:   PO Fluids    PO Meals     PO Intake: NPO  PO Supplement: NPO   PO Supplement Intake: NPO  IVF: none      NUTRITION RISK LEVEL: Risk Level: [address], LD  [place]:  [phone]  Office:  [phone]

## 2020-08-29 NOTE — PROGRESS NOTES
Urology Attending Progress Note      Subjective: NO COMPLAINTS    Vitals:  /61   Pulse 84   Temp 97.8 °F (36.6 °C) (Oral)   Resp 18   Ht 6' 2\" (1.88 m)   Wt 184 lb (83.5 kg)   SpO2 92%   BMI 23.62 kg/m²   Temp  Av °F (36.7 °C)  Min: 97.4 °F (36.3 °C)  Max: 98.9 °F (37.2 °C)    Intake/Output Summary (Last 24 hours) at 2020 1025  Last data filed at 2020 0919  Gross per 24 hour   Intake --   Output 4205 ml   Net -4205 ml       Exam: NAD  ABD SOFT  URINE LIGHT PINK    Labs:  WBC:    Lab Results   Component Value Date    WBC 15.2 2020     Hemoglobin/Hematocrit:    Lab Results   Component Value Date    HGB 9.8 2020    HCT 28.8 2020     BMP:    Lab Results   Component Value Date     2020    K 4.1 2020    K 3.9 2020     2020    CO2 24 2020    BUN 55 2020    LABALBU 2.8 2020    CREATININE 1.3 2020    CALCIUM 8.1 2020    GFRAA >60 2020    GFRAA >60 2012    LABGLOM 51 2020     PT/INR:    Lab Results   Component Value Date    PROTIME 12.2 2020    INR 1.05 2020     PTT:  No results found for: APTT[APTT          Impression/Plan: POD #1 S/P CYSTO AND CLOT EVAC. -DAN TO STAY FOR AT LEAST A WEEK D/T FALSE PASSAGE AND BLEEDING. -WEAN CBI TO OFF. OK TO PLUG IRRIGATION PORT AND TAKE OFF CBI LATER TODAY IF CLEAR.     Marizol Cole MD

## 2020-08-29 NOTE — PROGRESS NOTES
assessment  Dysphagia Impression : Pt with intermittent coughing throughout examination. Prior to PO intake when pt was asked to dry swallow for OME, pt stated \"It feels like something is stuck in there. \" He then proceeded to strongly cough and produced what appeared to be a pill. RN was immediately notified. PO intake of thin liquids and puree consistency solids appeared to be tolerated however after 5 minute delay, pt appeared to vomit what looked like apple sauce from PO trials. Pt then indicated that he is unable to keep food down. Note multiple instances of burping throughout trials. Intermittent coughing noted throughout session. Dysphagia Outcome Severity Scale: Level 1: Severe dysphagia- NPO. Unable to tolerate any PO safely     Treatment Plan  Requires SLP Intervention: Yes  Duration/Frequency of Treatment: 3-5x/wk for length of stay. D/C Recommendations: To be determined  Referral To: GI    Recommended Diet and Intervention  Diet Solids Recommendation: NPO  Liquid Consistency Recommendation: NPO  Recommended Form of Meds: Via alternative means of nutrition  Recommendations: NPO;Consider alternative nutrition;Consider ice chips PRN;Dysphagia treatment  Therapeutic Interventions: Diet tolerance monitoring;Oral care(Additional interventions TBD after GI consult.)    Compensatory Swallowing Strategies  Compensatory Swallowing Strategies: Upright as possible for all oral intake;Remain upright for 30-45 minutes after meals    Treatment/Goals  Dysphagia Goals: The patient will tolerate repeat BSE when able. ;The patient will recall and perform compensatory strategies, with no cues. General  Chart Reviewed: Yes  Comments: 79 yo male who has a PMHx of CAD, BPH with obstruction, L hip fracture s/p fixation, D-CHF, HTN and achalasia presented to the ED from [facility] for blood clots coming from his penis. Subjective  Subjective: Pt was visited laying in bed and was agreeable to evaluation.  He indicated that he did not have his hearing aids in and did not know where they were at this time. Behavior/Cognition: Alert; Cooperative  Respiratory Status: Room air  Communication Observation: Functional  Follows Directions: (Mild difficulty following verbal directions to swallow however unclear if d/t hearing impairment.)  Dentition: Edentulous(Endentulous for eval but pt indicated having top dentures not present in room at this time.)  Patient Positioning: Upright in bed  Baseline Vocal Quality: Normal  Volitional Cough: Strong  Prior Dysphagia History: patient was recently admitted 08/20/20- 08/24/20 for left femoral neck fracture where Fixation was performed and patient was transferred to [facility] [place] for rehab. He further has episodes of aspiration most likely due to achalasia and he saw his PCP  08/27/20 who started pt on Avelox for aspiration pneumonitis given worsening cough and increased oxygen requirement  Consistencies Administered: Dysphagia Pureed (Dysphagia I); Thin      Vision/Hearing  Hearing  Hearing: Exceptions to [facility]  Hearing Exceptions: Hard of hearing/hearing concerns(Pt reported owning hearing aids but unclear if bilateral.)    Oral Motor Deficits  Oral/Motor  Oral Motor: Within functional limits(Oral mechanism examination completed and unremarkable.)    Oral Phase Dysfunction  Oral Phase  Oral Phase: WFL  Oral Phase  Oral Phase - Comment: Pt with moderatly delayed AP transit of puree consistency solids and what appeared to be delayed swallow initiation. No anterior spillage noted on this date. Indicators of Pharyngeal Phase Dysfunction   Pharyngeal Phase  Pharyngeal Phase: Exceptions  Indicators of Pharyngeal Phase Dysfunction  Delayed Swallow: Pudding - teaspoon; Thin - cup  Cough - Delayed: Puree; Thin - cup  Pharyngeal Phase   Pharyngeal: Prior to PO intake when pt was asked to dry swallow for OME, pt stated \"It feels like something is stuck in there. \" He then proceeded to strongly cough and produced what appeared to be a pill. RN was immediately informed. PO intake of thin liquids and puree consistency solids appeared to be tolerated however after 5 minute delay, pt appeared to vomit what looked like apple sauce from PO trials. Pt then indicated that he is unable to keep food down. Note multiple instances of burping throughout trials. Prognosis  Prognosis  Prognosis for safe diet advancement: good  Barriers to reach goals: age;motivation  Individuals consulted  Consulted and agree with results and recommendations: RN;Patient    Education  Patient Education: Pt was educated to reason for NPO.   Patient Education Response: Verbalizes understanding  Safety Devices in place: Yes  Type of devices: Bed alarm in place;Call light within reach       Therapy Time  SLP Individual Minutes  Time In: 0940  Time Out: 1000  Minutes: 20     SLP Total Treatment Time  Timed Code Treatment Minutes: 0 Minutes  Total Treatment Time: 20    Electronically Signed By:  Luis Alonso, [id number] [facility]; [phone]  Speech-Language Pathologist    Luis Alonos, MANI  8/29/2020 12:21 PM

## 2020-08-29 NOTE — PROGRESS NOTES
Internal Medicine Progress Note    Admit Date: 8/28/2020  Day: 1  Diet: Diet NPO Effective Now Exceptions are: Ice Chips    CC: urinary rentention    Interval history: No acute events overnight. Patient has no complaints. Chambers shown diluted blood urine compared to yesterday. BP is elevated, added metoprolol to see if patient tolerates. Patient denies any pain, fever, chills, chest pain, shortness of breath. Medications:     Scheduled Meds:   metoprolol tartrate  25 mg Oral BID    aspirin  81 mg Oral Daily    atorvastatin  80 mg Oral Nightly    finasteride  5 mg Oral Daily    heparin (porcine)  5,000 Units Subcutaneous 3 times per day    levothyroxine  50 mcg Oral Daily    sodium chloride flush  10 mL Intravenous 2 times per day    pantoprazole  40 mg Oral Daily    levoFLOXacin  750 mg Oral Q48H    tamsulosin  0.4 mg Oral Nightly     Continuous Infusions:  PRN Meds:ondansetron, sodium chloride flush, acetaminophen **OR** acetaminophen, polyethylene glycol, promethazine **OR** ondansetron, ipratropium-albuterol, traMADol, labetalol    Objective:   Vitals:   T-max:  Patient Vitals for the past 8 hrs:   BP Temp Temp src Pulse Resp SpO2   08/29/20 1029 (!) 149/70 98 °F (36.7 °C) Oral 90 18 100 %   08/29/20 0920 138/61 97.8 °F (36.6 °C) Oral 84 18 92 %   08/29/20 0430 127/67 97.7 °F (36.5 °C) Axillary 61 17 95 %       Intake/Output Summary (Last 24 hours) at 8/29/2020 1124  Last data filed at 8/29/2020 1103  Gross per 24 hour   Intake --   Output 4455 ml   Net -4455 ml       Physical Exam  Constitutional:       Appearance: Normal appearance. Cardiovascular:      Rate and Rhythm: Normal rate and regular rhythm. Pulses: Normal pulses. Heart sounds: Normal heart sounds. Pulmonary:      Effort: Pulmonary effort is normal.      Breath sounds: Normal breath sounds. Abdominal:      General: Bowel sounds are normal.      Palpations: Abdomen is soft. Tenderness: There is abdominal tenderness. Genitourinary:     Comments: Catheter in place  Skin:     General: Skin is warm. Capillary Refill: Capillary refill takes less than 2 seconds. Neurological:      General: No focal deficit present. Mental Status: He is alert and oriented to person, place, and time. Psychiatric:         Mood and Affect: Mood normal.           LABS:    CBC:   Recent Labs     08/28/20  1206 08/29/20  0609   WBC 20.7* 15.2*   HGB 11.5* 9.8*   HCT 35.7* 28.8*    218   MCV 98.5 95.6     Renal:    Recent Labs     08/28/20  1206 08/28/20  1828 08/29/20  0608    145 145   K 3.9 3.8 4.1    107 111*   CO2 17* 17* 24   BUN 60* 59* 55*   CREATININE 1.4* 1.4* 1.3   GLUCOSE 124* 140* 123*   CALCIUM 8.7 8.6 8.1*   MG  --  3.00* 2.90*   PHOS  --   --  4.4   ANIONGAP 20* 21* 10     Hepatic:   Recent Labs     08/29/20  0608   LABALBU 2.8*     Troponin:   Recent Labs     08/28/20  1206 08/28/20 1828 08/29/20  0012   TROPONINI 0.04* 0.04* 0.03*     BNP: No results for input(s): BNP in the last 72 hours. Lipids: No results for input(s): CHOL, HDL in the last 72 hours. Invalid input(s): LDLCALCU, TRIGLYCERIDE  ABGs:  No results for input(s): PHART, IJB7NGL, PO2ART, CUX6VWI, BEART, THGBART, P9JYRMMC, PPD9PWP in the last 72 hours. INR:   Recent Labs     08/28/20  1206   INR 1.05     Lactate: No results for input(s): LACTATE in the last 72 hours. Cultures:  -----------------------------------------------------------------  RAD:   CT ABDOMEN PELVIS WO CONTRAST Additional Contrast? None   Final Result   1. Evidence of bladder outlet obstruction secondary to prostatomegaly with consequential bilateral hydroureter. .   2. Hyperattenuated material within the posterior aspect of the urinary bladder is concerning for hemorrhage/mass. Direct visualization can be obtained in proper clinical settings. 3. High-density focus in the midpole of the left kidney. Differentials include high-protein cyst/neoplasm.  Ultrasound of the kidney can be obtained. 4. Partially visualized soft tissue mass in the right posterior lateral chest wall, deep and separate from the right latissimus dorsi muscle. XR CHEST PORTABLE   Final Result      Interval clearing of the left lung base since prior study. No acute chest process. Assessment/Plan:   Gross hematuria 2/2 false passage 2/2 prostate enlargement   Hx of BPH with regular straight cath complicated by bleeding likely traumatic. S/p cystoscopy and clot evacutation  - follow up urology recs  - gonzales catheter for at least one week, CBI, wean to clear  - pain management; tramadol 25mg q6h PRN     Aspiration pneumonitis (HCC)  Cough, reported increased oxygen requirement at [facility] but today on RA + leukocytosis + cr finding. Was started on Avelox 400 mg by SNF physician. Improved CXR finding today. - continue Levofloxacin; pharmacy to renally adjust  - continue DuoNeb     MULU  Today Cr 1.4 (baseline 1.0 - 1.1). Likely obstructive given urinary retention.   - monitor  - strict intake/output  - hold off diuretics , lisinporol     HFpEF  Unlikely in excerebration. ProBNP elevated 2980 (from 1925 8 days ago) likely 2/2 volume overload caused by urinary retention. Last ECHO 8/20/2020 EF 50%  - d/c home ACE, diuretics for now given MULU  - strict I/O, daily weights         HTN  BP stable. On home lisinopril.   - will hold off ACE for now  - added metoprolol 25 po BID  - labetalol PRN for SBP>180     Closed left hip fracture  S/p fixation previous admission  - PT/OT  - tramadol 25 mg q8h for pain     Achalasia  Hx of aspiration, regurgitation. S/p multiple procedures in the past including EGD with Botox injection. - will be followed outpatient  - soft bite diet for now awaiting SLP eval     Leukocytosis  Likely reactive. Lactic acid 1.5. WBC 20.7 from 10.2 7 days ago. Afebrile. No evidence of infection. UA positive nitrite, moderate LE, 1+ bacteria, 3-5 WBC but pt has no sxs.    - will monitor for now  - follow up urine cx, blood cx        Urinary retention  2/2 BPH. Has been having straight cath while at [facility]. - flomax 0.4 mg   - finasteride   - plan as above      CAD  No symptoms. Today, troponin 0.04.  - trend troponin x 2  - order ECG     Hypothyroidism  On home synthroid. Last TSH 7.90 7/19/2017  - continue home synthroid       Code Status: Limited code  FEN: soft bite diet; card diet  PPX: heparin SQ  DISPO: TED [place], PGY-1  08/29/20  11:24 AM    This patient will be staffed and discussed with Israel Scott MD.     Addendum to Resident H& P/Progress note:  I have personally seen,examined and evaluated the patient. I have reviewed the current history, physical findings, labs and assessment and plan and agree with note as documented by resident MD ( )  The patient had speech path eval earlier today; recommendations were reviewed.     Israel Scott MD, FACP

## 2020-08-29 NOTE — PROGRESS NOTES
Patient alert and oriented x4. VSS and patient has remained afebrile throughout shift. Patient denies any pain. Patient placed on NPO status per speech due to aspiration during swallow eval. CBI running with no issues. Patient denies any other needs at this time. Bed is in the lowest position, call light and bedside table within reach. Patient bed alarm is on. Will continue to monitor for changes in patient status.      Electronically signed by Taqueria Marte RN on 8/29/2020 at 2:38 PM

## 2020-08-30 LAB
ALBUMIN SERPL-MCNC: 2.9 G/DL (ref 3.4–5)
ANION GAP SERPL CALCULATED.3IONS-SCNC: 10 MMOL/L (ref 3–16)
ANISOCYTOSIS: ABNORMAL
BASOPHILS ABSOLUTE: 0 K/UL (ref 0–0.2)
BASOPHILS RELATIVE PERCENT: 0 %
BUN BLDV-MCNC: 45 MG/DL (ref 7–20)
BURR CELLS: ABNORMAL
CALCIUM SERPL-MCNC: 8.6 MG/DL (ref 8.3–10.6)
CHLORIDE BLD-SCNC: 114 MMOL/L (ref 99–110)
CO2: 23 MMOL/L (ref 21–32)
CREAT SERPL-MCNC: 1.1 MG/DL (ref 0.8–1.3)
EKG ATRIAL RATE: 96 BPM
EKG ATRIAL RATE: 97 BPM
EKG DIAGNOSIS: NORMAL
EKG DIAGNOSIS: NORMAL
EKG P AXIS: -15 DEGREES
EKG P AXIS: 17 DEGREES
EKG P-R INTERVAL: 160 MS
EKG P-R INTERVAL: 220 MS
EKG Q-T INTERVAL: 396 MS
EKG Q-T INTERVAL: 404 MS
EKG QRS DURATION: 148 MS
EKG QRS DURATION: 162 MS
EKG QTC CALCULATION (BAZETT): 500 MS
EKG QTC CALCULATION (BAZETT): 513 MS
EKG R AXIS: -30 DEGREES
EKG R AXIS: -65 DEGREES
EKG T AXIS: 113 DEGREES
EKG T AXIS: 125 DEGREES
EKG VENTRICULAR RATE: 96 BPM
EKG VENTRICULAR RATE: 97 BPM
EOSINOPHILS ABSOLUTE: 0 K/UL (ref 0–0.6)
EOSINOPHILS RELATIVE PERCENT: 0 %
GFR AFRICAN AMERICAN: >60
GFR NON-AFRICAN AMERICAN: >60
GLUCOSE BLD-MCNC: 89 MG/DL (ref 70–99)
HCT VFR BLD CALC: 32.1 % (ref 40.5–52.5)
HEMOGLOBIN: 10.8 G/DL (ref 13.5–17.5)
LYMPHOCYTES ABSOLUTE: 6.2 K/UL (ref 1–5.1)
LYMPHOCYTES RELATIVE PERCENT: 36 %
MAGNESIUM: 2.8 MG/DL (ref 1.8–2.4)
MCH RBC QN AUTO: 32 PG (ref 26–34)
MCHC RBC AUTO-ENTMCNC: 33.6 G/DL (ref 31–36)
MCV RBC AUTO: 95.4 FL (ref 80–100)
MONOCYTES ABSOLUTE: 0.7 K/UL (ref 0–1.3)
MONOCYTES RELATIVE PERCENT: 4 %
MYELOCYTE PERCENT: 1 %
NEUTROPHILS ABSOLUTE: 10.4 K/UL (ref 1.7–7.7)
NEUTROPHILS RELATIVE PERCENT: 59 %
PDW BLD-RTO: 13.7 % (ref 12.4–15.4)
PHOSPHORUS: 3.3 MG/DL (ref 2.5–4.9)
PLATELET # BLD: 250 K/UL (ref 135–450)
PMV BLD AUTO: 8.9 FL (ref 5–10.5)
POIKILOCYTES: ABNORMAL
POTASSIUM SERPL-SCNC: 4.5 MMOL/L (ref 3.5–5.1)
RBC # BLD: 3.36 M/UL (ref 4.2–5.9)
SCHISTOCYTES: ABNORMAL
SODIUM BLD-SCNC: 147 MMOL/L (ref 136–145)
WBC # BLD: 17.3 K/UL (ref 4–11)

## 2020-08-30 PROCEDURE — 93010 ELECTROCARDIOGRAM REPORT: CPT | Performed by: INTERNAL MEDICINE

## 2020-08-30 PROCEDURE — 85025 COMPLETE CBC W/AUTO DIFF WBC: CPT

## 2020-08-30 PROCEDURE — C9113 INJ PANTOPRAZOLE SODIUM, VIA: HCPCS | Performed by: STUDENT IN AN ORGANIZED HEALTH CARE EDUCATION/TRAINING PROGRAM

## 2020-08-30 PROCEDURE — 6360000002 HC RX W HCPCS: Performed by: STUDENT IN AN ORGANIZED HEALTH CARE EDUCATION/TRAINING PROGRAM

## 2020-08-30 PROCEDURE — 99232 SBSQ HOSP IP/OBS MODERATE 35: CPT | Performed by: HOSPITALIST

## 2020-08-30 PROCEDURE — 6360000002 HC RX W HCPCS: Performed by: UROLOGY

## 2020-08-30 PROCEDURE — 2580000003 HC RX 258: Performed by: STUDENT IN AN ORGANIZED HEALTH CARE EDUCATION/TRAINING PROGRAM

## 2020-08-30 PROCEDURE — U0003 INFECTIOUS AGENT DETECTION BY NUCLEIC ACID (DNA OR RNA); SEVERE ACUTE RESPIRATORY SYNDROME CORONAVIRUS 2 (SARS-COV-2) (CORONAVIRUS DISEASE [COVID-19]), AMPLIFIED PROBE TECHNIQUE, MAKING USE OF HIGH THROUGHPUT TECHNOLOGIES AS DESCRIBED BY CMS-2020-01-R: HCPCS

## 2020-08-30 PROCEDURE — 36415 COLL VENOUS BLD VENIPUNCTURE: CPT

## 2020-08-30 PROCEDURE — 6370000000 HC RX 637 (ALT 250 FOR IP): Performed by: STUDENT IN AN ORGANIZED HEALTH CARE EDUCATION/TRAINING PROGRAM

## 2020-08-30 PROCEDURE — 80069 RENAL FUNCTION PANEL: CPT

## 2020-08-30 PROCEDURE — 1200000000 HC SEMI PRIVATE

## 2020-08-30 PROCEDURE — 83735 ASSAY OF MAGNESIUM: CPT

## 2020-08-30 PROCEDURE — 2500000003 HC RX 250 WO HCPCS: Performed by: STUDENT IN AN ORGANIZED HEALTH CARE EDUCATION/TRAINING PROGRAM

## 2020-08-30 RX ORDER — PANTOPRAZOLE SODIUM 40 MG/10ML
40 INJECTION, POWDER, LYOPHILIZED, FOR SOLUTION INTRAVENOUS DAILY
Status: DISCONTINUED | OUTPATIENT
Start: 2020-08-30 | End: 2020-08-31

## 2020-08-30 RX ORDER — CLONIDINE 0.2 MG/24H
1 PATCH, EXTENDED RELEASE TRANSDERMAL WEEKLY
Status: DISCONTINUED | OUTPATIENT
Start: 2020-08-30 | End: 2020-09-02 | Stop reason: HOSPADM

## 2020-08-30 RX ORDER — LEVOFLOXACIN 5 MG/ML
750 INJECTION, SOLUTION INTRAVENOUS
Status: DISCONTINUED | OUTPATIENT
Start: 2020-08-31 | End: 2020-09-02 | Stop reason: HOSPADM

## 2020-08-30 RX ORDER — DEXTROSE MONOHYDRATE 50 MG/ML
INJECTION, SOLUTION INTRAVENOUS CONTINUOUS
Status: DISCONTINUED | OUTPATIENT
Start: 2020-08-30 | End: 2020-09-02 | Stop reason: HOSPADM

## 2020-08-30 RX ADMIN — Medication 10 ML: at 08:54

## 2020-08-30 RX ADMIN — HEPARIN SODIUM 5000 UNITS: 5000 INJECTION INTRAVENOUS; SUBCUTANEOUS at 21:27

## 2020-08-30 RX ADMIN — LABETALOL HYDROCHLORIDE 10 MG: 5 INJECTION, SOLUTION INTRAVENOUS at 15:48

## 2020-08-30 RX ADMIN — HEPARIN SODIUM 5000 UNITS: 5000 INJECTION INTRAVENOUS; SUBCUTANEOUS at 06:35

## 2020-08-30 RX ADMIN — Medication 10 ML: at 21:27

## 2020-08-30 RX ADMIN — DEXTROSE MONOHYDRATE: 50 INJECTION, SOLUTION INTRAVENOUS at 11:05

## 2020-08-30 RX ADMIN — PANTOPRAZOLE SODIUM 40 MG: 40 INJECTION, POWDER, FOR SOLUTION INTRAVENOUS at 11:04

## 2020-08-30 RX ADMIN — HEPARIN SODIUM 5000 UNITS: 5000 INJECTION INTRAVENOUS; SUBCUTANEOUS at 15:48

## 2020-08-30 ASSESSMENT — PAIN SCALES - GENERAL
PAINLEVEL_OUTOF10: 0

## 2020-08-30 NOTE — CONSULTS
history.   Social History     Socioeconomic History    Marital status:      Spouse name: None    Number of children: None    Years of education: None    Highest education level: None   Occupational History    None   Social Needs    Financial resource strain: None    Food insecurity     Worry: None     Inability: None    Transportation needs     Medical: None     Non-medical: None   Tobacco Use    Smoking status: Never Smoker    Smokeless tobacco: Never Used   Substance and Sexual Activity    Alcohol use: No    Drug use: No    Sexual activity: None   Lifestyle    Physical activity     Days per week: None     Minutes per session: None    Stress: None   Relationships    Social connections     Talks on phone: None     Gets together: None     Attends Qu[employer] service: None     Active member of club or organization: None     Attends meetings of clubs or organizations: None     Relationship status: None    Intimate partner violence     Fear of current or ex partner: None     Emotionally abused: None     Physically abused: None     Forced sexual activity: None   Other Topics Concern    None   Social History Narrative    None       MEDICATIONS   SCHEDULED:  cloNIDine, 1 patch, Weekly  pantoprazole, 40 mg, Daily  [START ON 8/31/2020] levofloxacin, 750 mg, Q48H  metoprolol tartrate, 25 mg, BID  aspirin, 81 mg, Daily  atorvastatin, 80 mg, Nightly  finasteride, 5 mg, Daily  heparin (porcine), 5,000 Units, 3 times per day  levothyroxine, 50 mcg, Daily  sodium chloride flush, 10 mL, 2 times per day  tamsulosin, 0.4 mg, Nightly      FLUIDS/DRIPS:     dextrose 50 mL/hr at 08/30/20 1105     PRNs: ondansetron, 4 mg, Q6H PRN  sodium chloride flush, 10 mL, PRN  acetaminophen, 650 mg, Q6H PRN    Or  acetaminophen, 650 mg, Q6H PRN  polyethylene glycol, 17 g, Daily PRN  promethazine, 12.5 mg, Q6H PRN    Or  ondansetron, 4 mg, Q6H PRN  ipratropium-albuterol, 1 vial, Q6H PRN  traMADol, 25 mg, Q6H PRN  labetalol, 10 mg, Q6H PRN      ALLERGIES:  No Known Allergies     REVIEW OF SYSTEMS   A full 12 pt ROS is negative other than noted in HPI    PHYSICAL EXAM     Vitals:    08/29/20 2308 08/30/20 0317 08/30/20 0859 08/30/20 1215   BP: (!) 158/76 (!) 166/63 (!) 173/75 137/84   Pulse: 55 94 88 99   Resp: 18 16 16 16   Temp: 98.5 °F (36.9 °C) 98.3 °F (36.8 °C) 98.2 °F (36.8 °C) 98.6 °F (37 °C)   TempSrc: Oral Oral Oral Oral   SpO2: 98% 95% 96% 97%   Weight:       Height:          Physical Exam:  Gen: Resting in bed, NAD   CV: RRR no MRG   Pul: CTAB, coarse breath sounds bilaterally on anterior auscultation, normal WOB   Abd: Good bowel sounds throughout, soft, NT/ND, no masses, no HSM   Ext: No edema, atraumatic   Neuro: No gross deficits, moves all 4 extremities  Skin: No jaundice, spider angiomas, hoffman erythema    LABS AND IMAGING     Recent Results (from the past 24 hour(s))   Renal function panel    Collection Time: 08/30/20  5:05 AM   Result Value Ref Range    Sodium 147 (H) 136 - 145 mmol/L    Potassium 4.5 3.5 - 5.1 mmol/L    Chloride 114 (H) 99 - 110 mmol/L    CO2 23 21 - 32 mmol/L    Anion Gap 10 3 - 16    Glucose 89 70 - 99 mg/dL    BUN 45 (H) 7 - 20 mg/dL    CREATININE 1.1 0.8 - 1.3 mg/dL    GFR Non-African American >60 >60    GFR African American >60 >60    Calcium 8.6 8.3 - 10.6 mg/dL    Phosphorus 3.3 2.5 - 4.9 mg/dL    Alb 2.9 (L) 3.4 - 5.0 g/dL   Magnesium    Collection Time: 08/30/20  5:05 AM   Result Value Ref Range    Magnesium 2.80 (H) 1.80 - 2.40 mg/dL   CBC auto differential    Collection Time: 08/30/20  5:05 AM   Result Value Ref Range    WBC 17.3 (H) 4.0 - 11.0 K/uL    RBC 3.36 (L) 4.20 - 5.90 M/uL    Hemoglobin 10.8 (L) 13.5 - 17.5 g/dL    Hematocrit 32.1 (L) 40.5 - 52.5 %    MCV 95.4 80.0 - 100.0 fL    MCH 32.0 26.0 - 34.0 pg    MCHC 33.6 31.0 - 36.0 g/dL    RDW 13.7 12.4 - 15.4 %    Platelets 981 351 - 665 K/uL    MPV 8.9 5.0 - 10.5 fL    Neutrophils % 59.0 %    Lymphocytes % 36.0 %    Monocytes % 4.0 % Eosinophils % 0.0 %    Basophils % 0.0 %    Neutrophils Absolute 10.4 (H) 1.7 - 7.7 K/uL    Lymphocytes Absolute 6.2 (H) 1.0 - 5.1 K/uL    Monocytes Absolute 0.7 0.0 - 1.3 K/uL    Eosinophils Absolute 0.0 0.0 - 0.6 K/uL    Basophils Absolute 0.0 0.0 - 0.2 K/uL    Myelocyte Percent 1 (A) %    Anisocytosis Occasional (A)     Poikilocytes Occasional (A)     Schistocytes Occasional (A)     Morning Sun Cells Occasional (A)      Other Labs      Imaging      ASSESSMENT AND RECOMMENDATIONS   Karime Christianson is a 80 y.o. male who has a past history notable for achalasia, hypertension, hyperlipidemia, CHF, DVT who presented to [facility]. 8/28/2020 with hematuria. We have been consulted regarding achalasia with concern for aspiration pneumonitis. IMPRESSION:    1. Achalasia: Previously managed with Botox injection at the GE junction, with clinical improvement reported. We will schedule for repeat EGD with Botox injection 8/31/2020.  2. Hematuria 2/2 traumatic Chambers catheterization  3. Aspiration pneumonitis: On Levaquin  4. MULU, 2/2 obstructive uropathy. RECOMMENDATIONS:    -NPO for EGD 8/31/2020    If you have any questions or need any further information, please feel free to contact our consult team.  Thank you for allowing us to participate in the care of Karime Christianson. Betti Schilder.  [address] [facility]

## 2020-08-30 NOTE — PROGRESS NOTES
Speech Language Pathology  Dysphagia Note    Reviewed chart. Spoke with RN. Pt NPO with EGD scheduled for tomorrow. Will hold, and re-attempt at later date as appropriate.     Emily Olivo M.A., [address] [facility], [place].[phone]  Speech-Language Pathologist  Pager: [phone]

## 2020-08-30 NOTE — PLAN OF CARE
Problem: Falls - Risk of:  Goal: Will remain free from falls  Description: Will remain free from falls  Note: Patient has been on bedrest due to CBI and LLE weakness. Will continue to monitor for further needs. Problem: Urinary Elimination:  Goal: Ability to achieve a balanced intake and output will improve  Description: Ability to achieve a balanced intake and output will improve  Note: Patient remains npo due to swallowing issue. Receiving IVF.  Will continue to monitor

## 2020-08-30 NOTE — PROGRESS NOTES
Urology Progress Note      BP (!) 183/65   Pulse 91   Temp 98.4 °F (36.9 °C) (Oral)   Resp 16   Ht 6' 2\" (1.88 m)   Wt 184 lb (83.5 kg)   SpO2 95%   BMI 23.62 kg/m²   Temp  Av.4 °F (36.9 °C)  Min: 98.2 °F (36.8 °C)  Max: 98.6 °F (37 °C)    S/p cysto and clot evac  -urine is clear  -we will plug the cbi irrigation port  -gonzales to stay for at least a week to allow false passage to heal      David Fink MD

## 2020-08-30 NOTE — PROGRESS NOTES
Physician Progress Note      PATIENTDaaylin Lane  [facility] #:                  [id number]  :                       [id number]  ADMIT DATE:       2020 9:52 AM  DISCH DATE:  RESPONDING  PROVIDER #:        Linda Guardado MD          QUERY TEXT:    Pt admitted with Gross Hematuria. Patient underwent evacuation of clots in   bladder. If possible, please document in progress notes and discharge summary   further specificity regarding the acuity and type of anemia:    The medical record reflects the following:  Risk Factors: 79 y/o male with Gross Hematuria, Chambers catheter, evacuation of   Blood clots  Clinical Indicators: H/H , 11.5/35.7,  9.8/28.8  Treatment: Lab monitoring, Clot evacuation, CBI, 3 way Chambers placed, Monitor   for bleeding  Options provided:  -- Anemia due to acute blood loss due to Gross Hematuria  -- Anemia due to chronic blood loss  -- Other - I will add my own diagnosis  -- Disagree - Not applicable / Not valid  -- Disagree - Clinically unable to determine / Unknown  -- Refer to Clinical Documentation Reviewer    PROVIDER RESPONSE TEXT:    This patient has acute blood loss anemia due to Gross Hematuria. Query created by: Ernesto Calhoun on 2020 8:08 AM      QUERY TEXT:    Patient admitted with Gross Hematuria. If possible, please document in   progress notes and discharge summary if you are evaluating and/or treating any   of the following:     The medical record reflects the following:  Risk Factors: 79 y/o male  Clinical Indicators: UA Bacteria 1+ Leukocyte Esterase- moderate  Treatment: Levaquin  Options provided:  -- UTI  -- No UTI, please explain UA .  -- Other - I will add my own diagnosis  -- Disagree - Not applicable / Not valid  -- Disagree - Clinically unable to determine / Unknown  -- Refer to Clinical Documentation Reviewer    PROVIDER RESPONSE TEXT:    This patient has no UTI urine cx no growth    Query created by: Ernesto Calhoun on 2020 8:12 AM      Electronically signed by:  Shell Julien MD 8/30/2020 3:16 PM

## 2020-08-30 NOTE — PROGRESS NOTES
Extraocular Movements: Extraocular movements intact. Conjunctiva/sclera: Conjunctivae normal.   Cardiovascular:      Rate and Rhythm: Normal rate and regular rhythm. Pulses: Normal pulses. Heart sounds: No murmur. No gallop. Pulmonary:      Effort: Pulmonary effort is normal. No respiratory distress. Breath sounds: No wheezing. Abdominal:      General: Bowel sounds are normal. There is no distension. Palpations: Abdomen is soft. Tenderness: There is no abdominal tenderness. Genitourinary:     Comments: Catheter in place, clear drainage  Musculoskeletal: Normal range of motion. Right lower leg: No edema. Left lower leg: No edema. Neurological:      General: No focal deficit present. Mental Status: He is alert and oriented to person, place, and time. Labs:   Recent Labs     08/28/20  1206 08/29/20  0609 08/30/20  0505   WBC 20.7* 15.2* 17.3*   HGB 11.5* 9.8* 10.8*   HCT 35.7* 28.8* 32.1*    218 250     Recent Labs     08/28/20  1828 08/29/20  0608 08/30/20  0505    145 147*   K 3.8 4.1 4.5    111* 114*   CO2 17* 24 23   BUN 59* 55* 45*   CREATININE 1.4* 1.3 1.1   CALCIUM 8.6 8.1* 8.6   PHOS  --  4.4 3.3     No results for input(s): AST, ALT, BILIDIR, BILITOT, ALKPHOS in the last 72 hours. Recent Labs     08/28/20  1206   INR 1.05     Recent Labs     08/28/20  1206 08/28/20  1828 08/29/20  0012   TROPONINI 0.04* 0.04* 0.03*       Urinalysis:      Lab Results   Component Value Date    NITRU POSITIVE 08/28/2020    WBCUA 3-5 08/28/2020    BACTERIA 1+ 08/28/2020    RBCUA >100 08/28/2020    BLOODU LARGE 08/28/2020    SPECGRAV 1.010 08/28/2020    GLUCOSEU 100 08/28/2020       Radiology:  CT ABDOMEN PELVIS WO CONTRAST Additional Contrast? None   Final Result   1. Evidence of bladder outlet obstruction secondary to prostatomegaly with consequential bilateral hydroureter. .   2. Hyperattenuated material within the posterior aspect of the urinary bladder is concerning for hemorrhage/mass. Direct visualization can be obtained in proper clinical settings. 3. High-density focus in the midpole of the left kidney. Differentials include high-protein cyst/neoplasm. Ultrasound of the kidney can be obtained. 4. Partially visualized soft tissue mass in the right posterior lateral chest wall, deep and separate from the right latissimus dorsi muscle. XR CHEST PORTABLE   Final Result      Interval clearing of the left lung base since prior study. No acute chest process. Assessment/Plan:    Gross hematuria 2/2 false passage 2/2 prostate enlargement  - Hx of BPH with regular straight cath complicated by bleeding likely traumatic. S/p cystoscopy and clot evacuation. No evidence of Infection, WBC in UA most likely due to trauma. - Urology consulted, recs appreciated  - gonzales catheter for at least one week  - CBI with clear drainage  - pain management; tramadol 25mg q6h PRN     Aspiration pneumonitis (HCC)  Cough, reported increased oxygen requirement at [facility] but today on RA + leukocytosis + cr finding. Was started on Avelox 400 [facility] physician. Improved CXR finding today. - continue Levofloxacin; pharmacy to renally adjust  - continue DuoNeb PRN SOB/Wheezing     MULU - 1.4 on admission (baseline 1.0 - 1.1).  Likely obstructive given urinary retention.   - monitor  - strict intake/output  - hold diuretics and lisinporol     HFpEF  Not in actue excerebration. ProBNP elevated 2980 (from 1925 8 days ago) likely 2/2 volume overload caused by urinary retention.  Last ECHO 8/20/2020 EF 50%  - hold home ACE, diuretics until MULU improves, will restart as indicated  - strict I/O, daily weights      HTN  BP stable. On home lisinopril.   - will hold off ACE for now  - continue metoprolol 25 po BID  - labetalol PRN for SBP>180     Closed left hip fracture  S/p fixation previous admission  - PT/OT  - tramadol 25 mg q8h for pain     Achalasia  Hx of

## 2020-08-30 NOTE — PROGRESS NOTES
Pt ao4, SBP is on high end 160s. Pt denied any pain, n/v, sob. Had plenty of coughs first half of the shift, no emesis noted but saliva+ white thin phlegm. Pt gonzales remain in place and CBI as gravity, output light pink, adequate output. Pt in bed w bed alarm on, calls out appropriately for assistance.  Will continue to monitor

## 2020-08-30 NOTE — PROGRESS NOTES
Patient alert and oriented x4. VSS with the exception of elevated bp and patient has been medicated per MAR. CBI has been discontinued per MD. Patient receiving IVF with no issues. Denies any pain or n/v. Patient denies any other needs at this time. Bed is in the lowest position, call light and bedside table within reach. Patient bed alarm is on. Will continue to monitor for changes in patient status.      Electronically signed by Abdifatah Barragan RN on 8/30/2020 at 6:27 PM

## 2020-08-31 ENCOUNTER — ANESTHESIA EVENT (OUTPATIENT)
Dept: ENDOSCOPY | Age: 85
DRG: 698 | End: 2020-08-31
Payer: MEDICARE

## 2020-08-31 ENCOUNTER — ANESTHESIA (OUTPATIENT)
Dept: ENDOSCOPY | Age: 85
DRG: 698 | End: 2020-08-31
Payer: MEDICARE

## 2020-08-31 VITALS — SYSTOLIC BLOOD PRESSURE: 103 MMHG | OXYGEN SATURATION: 99 % | DIASTOLIC BLOOD PRESSURE: 67 MMHG

## 2020-08-31 LAB
ALBUMIN SERPL-MCNC: 2.8 G/DL (ref 3.4–5)
ANION GAP SERPL CALCULATED.3IONS-SCNC: 10 MMOL/L (ref 3–16)
BASOPHILS ABSOLUTE: 0 K/UL (ref 0–0.2)
BASOPHILS RELATIVE PERCENT: 0 %
BUN BLDV-MCNC: 32 MG/DL (ref 7–20)
CALCIUM SERPL-MCNC: 8.4 MG/DL (ref 8.3–10.6)
CHLORIDE BLD-SCNC: 116 MMOL/L (ref 99–110)
CO2: 22 MMOL/L (ref 21–32)
CREAT SERPL-MCNC: 1 MG/DL (ref 0.8–1.3)
EOSINOPHILS ABSOLUTE: 0 K/UL (ref 0–0.6)
EOSINOPHILS RELATIVE PERCENT: 0 %
GFR AFRICAN AMERICAN: >60
GFR NON-AFRICAN AMERICAN: >60
GLUCOSE BLD-MCNC: 115 MG/DL (ref 70–99)
HCT VFR BLD CALC: 28.3 % (ref 40.5–52.5)
HEMOGLOBIN: 9.5 G/DL (ref 13.5–17.5)
LYMPHOCYTES ABSOLUTE: 8.6 K/UL (ref 1–5.1)
LYMPHOCYTES RELATIVE PERCENT: 56 %
MAGNESIUM: 2.5 MG/DL (ref 1.8–2.4)
MCH RBC QN AUTO: 32.2 PG (ref 26–34)
MCHC RBC AUTO-ENTMCNC: 33.5 G/DL (ref 31–36)
MCV RBC AUTO: 96.1 FL (ref 80–100)
MONOCYTES ABSOLUTE: 1.2 K/UL (ref 0–1.3)
MONOCYTES RELATIVE PERCENT: 8 %
NEUTROPHILS ABSOLUTE: 5.5 K/UL (ref 1.7–7.7)
NEUTROPHILS RELATIVE PERCENT: 36 %
PDW BLD-RTO: 14.1 % (ref 12.4–15.4)
PHOSPHORUS: 3.2 MG/DL (ref 2.5–4.9)
PLATELET # BLD: 255 K/UL (ref 135–450)
PMV BLD AUTO: 8.6 FL (ref 5–10.5)
POTASSIUM SERPL-SCNC: 3.7 MMOL/L (ref 3.5–5.1)
RBC # BLD: 2.95 M/UL (ref 4.2–5.9)
SODIUM BLD-SCNC: 148 MMOL/L (ref 136–145)
WBC # BLD: 15.4 K/UL (ref 4–11)

## 2020-08-31 PROCEDURE — 94760 N-INVAS EAR/PLS OXIMETRY 1: CPT

## 2020-08-31 PROCEDURE — 2709999900 HC NON-CHARGEABLE SUPPLY: Performed by: INTERNAL MEDICINE

## 2020-08-31 PROCEDURE — 83735 ASSAY OF MAGNESIUM: CPT

## 2020-08-31 PROCEDURE — 80069 RENAL FUNCTION PANEL: CPT

## 2020-08-31 PROCEDURE — 6360000002 HC RX W HCPCS: Performed by: INTERNAL MEDICINE

## 2020-08-31 PROCEDURE — 6360000002 HC RX W HCPCS: Performed by: STUDENT IN AN ORGANIZED HEALTH CARE EDUCATION/TRAINING PROGRAM

## 2020-08-31 PROCEDURE — 3609017700 HC EGD DILATION GASTRIC/DUODENAL STRICTURE: Performed by: INTERNAL MEDICINE

## 2020-08-31 PROCEDURE — 2580000003 HC RX 258: Performed by: NURSE ANESTHETIST, CERTIFIED REGISTERED

## 2020-08-31 PROCEDURE — 3E0G8GC INTRODUCTION OF OTHER THERAPEUTIC SUBSTANCE INTO UPPER GI, VIA NATURAL OR ARTIFICIAL OPENING ENDOSCOPIC: ICD-10-PCS | Performed by: INTERNAL MEDICINE

## 2020-08-31 PROCEDURE — 36415 COLL VENOUS BLD VENIPUNCTURE: CPT

## 2020-08-31 PROCEDURE — 7100000010 HC PHASE II RECOVERY - FIRST 15 MIN: Performed by: INTERNAL MEDICINE

## 2020-08-31 PROCEDURE — 85025 COMPLETE CBC W/AUTO DIFF WBC: CPT

## 2020-08-31 PROCEDURE — 1200000000 HC SEMI PRIVATE

## 2020-08-31 PROCEDURE — 6370000000 HC RX 637 (ALT 250 FOR IP): Performed by: INTERNAL MEDICINE

## 2020-08-31 PROCEDURE — 2580000003 HC RX 258: Performed by: STUDENT IN AN ORGANIZED HEALTH CARE EDUCATION/TRAINING PROGRAM

## 2020-08-31 PROCEDURE — 99232 SBSQ HOSP IP/OBS MODERATE 35: CPT | Performed by: HOSPITALIST

## 2020-08-31 PROCEDURE — C9113 INJ PANTOPRAZOLE SODIUM, VIA: HCPCS | Performed by: INTERNAL MEDICINE

## 2020-08-31 PROCEDURE — 3700000001 HC ADD 15 MINUTES (ANESTHESIA): Performed by: INTERNAL MEDICINE

## 2020-08-31 PROCEDURE — 2500000003 HC RX 250 WO HCPCS: Performed by: NURSE ANESTHETIST, CERTIFIED REGISTERED

## 2020-08-31 PROCEDURE — 94664 DEMO&/EVAL PT USE INHALER: CPT

## 2020-08-31 PROCEDURE — 3700000000 HC ANESTHESIA ATTENDED CARE: Performed by: INTERNAL MEDICINE

## 2020-08-31 PROCEDURE — 6370000000 HC RX 637 (ALT 250 FOR IP): Performed by: STUDENT IN AN ORGANIZED HEALTH CARE EDUCATION/TRAINING PROGRAM

## 2020-08-31 PROCEDURE — 3609013800 HC EGD SUBMUCOSAL/BOTOX INJECTION: Performed by: INTERNAL MEDICINE

## 2020-08-31 PROCEDURE — 7100000011 HC PHASE II RECOVERY - ADDTL 15 MIN: Performed by: INTERNAL MEDICINE

## 2020-08-31 PROCEDURE — 6360000002 HC RX W HCPCS: Performed by: NURSE ANESTHETIST, CERTIFIED REGISTERED

## 2020-08-31 PROCEDURE — 0DJ08ZZ INSPECTION OF UPPER INTESTINAL TRACT, VIA NATURAL OR ARTIFICIAL OPENING ENDOSCOPIC: ICD-10-PCS | Performed by: INTERNAL MEDICINE

## 2020-08-31 PROCEDURE — C1726 CATH, BAL DIL, NON-VASCULAR: HCPCS | Performed by: INTERNAL MEDICINE

## 2020-08-31 PROCEDURE — 6370000000 HC RX 637 (ALT 250 FOR IP): Performed by: UROLOGY

## 2020-08-31 PROCEDURE — C9113 INJ PANTOPRAZOLE SODIUM, VIA: HCPCS | Performed by: STUDENT IN AN ORGANIZED HEALTH CARE EDUCATION/TRAINING PROGRAM

## 2020-08-31 PROCEDURE — 6360000002 HC RX W HCPCS: Performed by: UROLOGY

## 2020-08-31 RX ORDER — PROPOFOL 10 MG/ML
INJECTION, EMULSION INTRAVENOUS CONTINUOUS PRN
Status: DISCONTINUED | OUTPATIENT
Start: 2020-08-31 | End: 2020-08-31 | Stop reason: SDUPTHER

## 2020-08-31 RX ORDER — IPRATROPIUM BROMIDE AND ALBUTEROL SULFATE 2.5; .5 MG/3ML; MG/3ML
1 SOLUTION RESPIRATORY (INHALATION) EVERY 6 HOURS PRN
Status: DISCONTINUED | OUTPATIENT
Start: 2020-08-31 | End: 2020-09-02 | Stop reason: HOSPADM

## 2020-08-31 RX ORDER — KETAMINE HYDROCHLORIDE 50 MG/ML
INJECTION, SOLUTION, CONCENTRATE INTRAMUSCULAR; INTRAVENOUS PRN
Status: DISCONTINUED | OUTPATIENT
Start: 2020-08-31 | End: 2020-08-31 | Stop reason: SDUPTHER

## 2020-08-31 RX ORDER — GLYCOPYRROLATE 0.2 MG/ML
INJECTION INTRAMUSCULAR; INTRAVENOUS PRN
Status: DISCONTINUED | OUTPATIENT
Start: 2020-08-31 | End: 2020-08-31 | Stop reason: SDUPTHER

## 2020-08-31 RX ORDER — IPRATROPIUM BROMIDE AND ALBUTEROL SULFATE 2.5; .5 MG/3ML; MG/3ML
1 SOLUTION RESPIRATORY (INHALATION) EVERY 6 HOURS
Status: DISCONTINUED | OUTPATIENT
Start: 2020-08-31 | End: 2020-08-31

## 2020-08-31 RX ORDER — SODIUM CHLORIDE, SODIUM LACTATE, POTASSIUM CHLORIDE, CALCIUM CHLORIDE 600; 310; 30; 20 MG/100ML; MG/100ML; MG/100ML; MG/100ML
INJECTION, SOLUTION INTRAVENOUS CONTINUOUS PRN
Status: DISCONTINUED | OUTPATIENT
Start: 2020-08-31 | End: 2020-08-31 | Stop reason: SDUPTHER

## 2020-08-31 RX ORDER — PANTOPRAZOLE SODIUM 40 MG/10ML
40 INJECTION, POWDER, LYOPHILIZED, FOR SOLUTION INTRAVENOUS 2 TIMES DAILY
Status: DISCONTINUED | OUTPATIENT
Start: 2020-08-31 | End: 2020-09-02 | Stop reason: HOSPADM

## 2020-08-31 RX ADMIN — PANTOPRAZOLE SODIUM 40 MG: 40 INJECTION, POWDER, FOR SOLUTION INTRAVENOUS at 21:03

## 2020-08-31 RX ADMIN — FINASTERIDE 5 MG: 5 TABLET, FILM COATED ORAL at 09:32

## 2020-08-31 RX ADMIN — HEPARIN SODIUM 5000 UNITS: 5000 INJECTION INTRAVENOUS; SUBCUTANEOUS at 15:23

## 2020-08-31 RX ADMIN — KETAMINE HYDROCHLORIDE 50 MG: 50 INJECTION, SOLUTION INTRAMUSCULAR; INTRAVENOUS at 13:46

## 2020-08-31 RX ADMIN — ONABOTULINUMTOXINA 100 UNITS: 100 INJECTION, POWDER, LYOPHILIZED, FOR SOLUTION INTRADERMAL; INTRAMUSCULAR at 15:25

## 2020-08-31 RX ADMIN — ONDANSETRON 4 MG: 2 INJECTION INTRAMUSCULAR; INTRAVENOUS at 04:09

## 2020-08-31 RX ADMIN — ASPIRIN 81 MG: 81 TABLET, COATED ORAL at 09:33

## 2020-08-31 RX ADMIN — ATORVASTATIN CALCIUM 80 MG: 80 TABLET, FILM COATED ORAL at 21:02

## 2020-08-31 RX ADMIN — SODIUM CHLORIDE, SODIUM LACTATE, POTASSIUM CHLORIDE, AND CALCIUM CHLORIDE: 600; 310; 30; 20 INJECTION, SOLUTION INTRAVENOUS at 13:43

## 2020-08-31 RX ADMIN — GLYCOPYRROLATE 0.4 MG: 0.2 INJECTION, SOLUTION INTRAMUSCULAR; INTRAVENOUS at 13:48

## 2020-08-31 RX ADMIN — LEVOFLOXACIN 750 MG: 5 INJECTION, SOLUTION INTRAVENOUS at 09:34

## 2020-08-31 RX ADMIN — HEPARIN SODIUM 5000 UNITS: 5000 INJECTION INTRAVENOUS; SUBCUTANEOUS at 22:41

## 2020-08-31 RX ADMIN — TAMSULOSIN HYDROCHLORIDE 0.4 MG: 0.4 CAPSULE ORAL at 21:02

## 2020-08-31 RX ADMIN — GLYCOPYRROLATE 0.4 MG: 0.2 INJECTION, SOLUTION INTRAMUSCULAR; INTRAVENOUS at 14:04

## 2020-08-31 RX ADMIN — METOPROLOL TARTRATE 25 MG: 50 TABLET, FILM COATED ORAL at 21:02

## 2020-08-31 RX ADMIN — METOPROLOL TARTRATE 25 MG: 50 TABLET, FILM COATED ORAL at 09:33

## 2020-08-31 RX ADMIN — PROPOFOL 50 MCG/KG/MIN: 10 INJECTION, EMULSION INTRAVENOUS at 13:47

## 2020-08-31 RX ADMIN — Medication 10 ML: at 09:34

## 2020-08-31 RX ADMIN — HEPARIN SODIUM 5000 UNITS: 5000 INJECTION INTRAVENOUS; SUBCUTANEOUS at 06:15

## 2020-08-31 RX ADMIN — PANTOPRAZOLE SODIUM 40 MG: 40 INJECTION, POWDER, FOR SOLUTION INTRAVENOUS at 09:34

## 2020-08-31 ASSESSMENT — PULMONARY FUNCTION TESTS
PIF_VALUE: 0

## 2020-08-31 ASSESSMENT — PAIN SCALES - GENERAL
PAINLEVEL_OUTOF10: 0

## 2020-08-31 NOTE — CARE COORDINATION
CM following, POD#3 of cysto with clot evac, urine clear with FC will need VT in 1 week. Getting EGD with Dr Mick Avina today. Cont ABX and flomax. Plans to return to [facility] once medically stable, will need COVID within 5 days of DC (sent 8/30/20) and will need PT TO notes to return to SNF.  Electronically signed by Azalea Benitez RN on 8/31/2020 at 1:30 PM  [phone]

## 2020-08-31 NOTE — PROGRESS NOTES
Pt is alert and oriented. Vsstable. No c/o of pain. Pt remains NPO currently trying to get a hold of son so pt can have procedure in ENDO. Pt son still has not returned call. Chambers remains in place with tea colored urine.  No new skin issues not listed in LDA's

## 2020-08-31 NOTE — PROGRESS NOTES
Urology Attending Progress Note      Subjective: no  complaints     Vitals:  BP (!) 148/82   Pulse 92   Temp 98.2 °F (36.8 °C) (Oral)   Resp 16   Ht 6' 2\" (1.88 m)   Wt 184 lb (83.5 kg)   SpO2 95%   BMI 23.62 kg/m²   Temp  Av.5 °F (36.9 °C)  Min: 97.8 °F (36.6 °C)  Max: 99.5 °F (37.5 °C)    Intake/Output Summary (Last 24 hours) at 2020 0959  Last data filed at 2020 0330  Gross per 24 hour   Intake 0 ml   Output 350 ml   Net -350 ml       Exam: abd soft. Chambers draining clear urine     Labs:  WBC:    Lab Results   Component Value Date    WBC 15.4 2020     Hemoglobin/Hematocrit:    Lab Results   Component Value Date    HGB 9.5 2020    HCT 28.3 2020     BMP:    Lab Results   Component Value Date     2020    K 3.7 2020    K 3.9 2020     2020    CO2 22 2020    BUN 32 2020    LABALBU 2.8 2020    CREATININE 1.0 2020    CALCIUM 8.4 2020    GFRAA >60 2020    GFRAA >60 2012    LABGLOM >60 2020     PT/INR:    Lab Results   Component Value Date    PROTIME 12.2 2020    INR 1.05 2020     PTT:  No results found for: APTT[APTT        Urine Culture:  NGTD    Blood Culture:  NGTD    Antibiotic Therapy:  Levaquin     Imaging: CT abd/pelvis 20  1. Evidence of bladder outlet obstruction secondary to prostatomegaly with consequential bilateral hydroureter. .    2. Hyperattenuated material within the posterior aspect of the urinary bladder is concerning for hemorrhage/mass. Direct visualization can be obtained in proper clinical settings. 3. High-density focus in the midpole of the left kidney. Differentials include high-protein cyst/neoplasm. Ultrasound of the kidney can be obtained.     4. Partially visualized soft tissue mass in the right posterior lateral chest wall, deep and separate from the right latissimus dorsi muscle.              Impression/Plan: 81 yo M POD#3 s/p cysto, clot

## 2020-08-31 NOTE — PROGRESS NOTES
RESPIRATORY THERAPY ASSESSMENT FOR PRN PATIENTS    Name:  Jean Phelps  Medical Record Number:  [id number]  Age: 80 y.o.    Gender: male  : 1925  Today's Date:  2020  Room:  [facility]/5310-01  Patient Admission Diagnosis      Allergies  No Known Allergies    Vital Signs   /69   Pulse 94   Temp 98.3 °F (36.8 °C) (Oral)   Resp 14   Ht 6' 2\" (1.88 m)   Wt 184 lb (83.5 kg)   SpO2 96%   BMI 23.62 kg/m²     Plan       Goals: Discharge  Comments: Chart reviewed  Plan of Care: home regimen    Patient/caregiver was educated on the proper method of use of Respiratory Therapy device:  Yes      Level of patient/caregiver understanding able to:   [] Verbalize understanding   [] Demonstrate understanding       [] Teach back        [] Needs reinforcement       []  No available caregiver               [x]  Other:     Response to education:  N/A     Electronically signed by Amanuel Bailey RCP on 2020 at 4:55 PM

## 2020-08-31 NOTE — PROCEDURES
[facility]  EGD REPORT    Patient:  Jean Phelps                  2/27/1925    Referring Physician:  Greg Fleming    Endoscopist: Isaak     Indication:  Achalasia     Medications:  MAC      Pre-Anesthesia Assessment:  I have reviewed and am in agreement with patient history and medication, including previous response to sedation. Prior to the procedure, a History and Physical was performed, and patient medications and allergies were reviewed. The patient is competent. The risks and benefits of the procedure and the sedation options and risks were discussed with the patient. Risks discussed included but were not limited to infection, bleeding, perforation, death, and missed lesions. All questions were answered and informed consent was obtained. Patient identification and proposed procedure were verified by the physician and the nurse in the pre-procedure area in the procedure room. Mallampatti: II  ASA Grade Assessment: 3     After reviewing the risks and benefits, the patient was deemed in satisfactory condition to undergo the procedure. The anesthesia plan was to use MAC anesthesia. Immediately prior to administration of medications, the patient was re-assessed for adequacy to receive sedatives and a time out was performed. Patient and healthcare providers were in agreement it was the correct patient and procedure. The heart rate, respiratory rate, oxygen saturations, blood pressure, adequacy of pulmonary ventilation, and response to care were monitored throughout the procedure. The physical status of the patient was re-assessed after the procedure. After obtaining informed consent, the endoscope was passed under direct vision. The endoscope was introduced through the mouth, and advanced to the second part of duodenum. The EGD was accomplished without difficulty. The patient tolerated the procedure well. Upon entering the esophagus there is a large amount of fluid and food debris. The fluid was suctioned out. The pills and food were gently pushed through the EGJ into the stomach. Duodenum: Normal  Stomach: lipoma in the antrum and mild erythema. Retroflexion did not show a hiatal hernia. Esophagus: GE junction at 45cms. No Evidence of Newsome's but mild erythema at the EGJ. 100 units of botox were injected in 4 equal aliquots around the EGJ. There was no bleb suggesting the botox went into the muscularis propria. The 18mm balloon was then inflated to help diffuse the botox and dilate the EGJ. The site was inspected and there was no obvious complication. There were ulcers extending up to 30cms throughout the esophagus. Estimated blood loss none    Plan:  BID PPI  Advance diet as tolerated; typically takes 1-2 days for botox to take full effect. Discussed with son via telephone. Call back with questions or concerns.    Repeat as needed

## 2020-08-31 NOTE — ANESTHESIA PRE PROCEDURE
Department of Anesthesiology  Preprocedure Note       Name:  Hu Soriano   Age:  80 y.o.  :  1925                                          MRN:  [id number]         Date:  2020      Surgeon: Chi Hilton):  Jose C Yusuf MD    Procedure: Procedure(s):  CYSTOSCOPY AND EVACUATION OF CLOTS    Medications prior to admission:   Prior to Admission medications    Medication Sig Start Date End Date Taking? Authorizing Provider   atorvastatin (LIPITOR) 80 MG tablet Take 80 mg by mouth nightly    Historical Provider, MD   tamsulosin (FLOMAX) 0.4 MG capsule Take 0.4 mg by mouth nightly    Historical Provider, MD   lisinopril (PRINIVIL;ZESTRIL) 20 MG tablet Take 20 mg by mouth daily    Historical Provider, MD   pantoprazole (PROTONIX) 40 MG tablet Take 40 mg by mouth daily    Historical Provider, MD   torsemide (DEMADEX) 10 MG tablet Take 10 mg by mouth daily    Historical Provider, MD   ipratropium-albuterol (DUONEB) 0.5-2.5 (3) MG/3ML SOLN nebulizer solution Inhale 1 vial into the lungs every 6 hours    Historical Provider, MD   ipratropium-albuterol (DUONEB) 0.5-2.5 (3) MG/3ML SOLN nebulizer solution Inhale 1 vial into the lungs every 6 hours as needed for Shortness of Breath    Historical Provider, MD   moxifloxacin (AVELOX) 400 MG tablet Take 400 mg by mouth daily 8/27/20 9/3/20  Historical Provider, MD   traMADol (ULTRAM) 50 MG tablet Take 50 mg by mouth every 6 hours as needed for Pain.     Historical Provider, MD   ondansetron (ZOFRAN) 4 MG tablet Take 4 mg by mouth every 6 hours as needed for Nausea or Vomiting    Historical Provider, MD   finasteride (PROSCAR) 5 MG tablet Take 5 mg by mouth daily    Historical Provider, MD   Heparin Sodium, Porcine, (HEPARIN, PORCINE,) 5000 UNIT/ML injection Inject 1 mL into the skin every 8 hours for 28 days 20  Félix Weathers MD   levothyroxine (SYNTHROID) 50 MCG tablet Take 50 mcg by mouth Daily    Historical Provider, MD   clopidogrel (PLAVIX) 75 MG tablet Take 75 mg by mouth daily. Historical Provider, MD   aspirin 81 MG tablet Take 81 mg by mouth daily. Historical Provider, MD       Current medications:    No current facility-administered medications for this visit. No current outpatient medications on file.      Facility-Administered Medications Ordered in Other Visits   Medication Dose Route Frequency Provider Last Rate Last Dose    cloNIDine (CATAPRES) 0.2 MG/24HR 1 patch  1 patch Transdermal Weekly Penny Wen, DO   1 patch at 08/30/20 1104    pantoprazole (PROTONIX) injection 40 mg  40 mg Intravenous Daily Penny Wen, DO   40 mg at 08/31/20 0934    levoFLOXacin (LEVAQUIN) 750 MG/150ML infusion 750 mg  750 mg Intravenous Q48H Penny Wen, DO   Stopped at 08/31/20 1046    dextrose 5 % solution   Intravenous Continuous Lyndsay Vazquez MD 75 mL/hr at 08/31/20 0928      metoprolol tartrate (LOPRESSOR) tablet 25 mg  25 mg Oral BID Nataliya Trejo MD   25 mg at 08/31/20 0933    aspirin EC tablet 81 mg  81 mg Oral Daily Maricel Rosales MD   81 mg at 08/31/20 0933    atorvastatin (LIPITOR) tablet 80 mg  80 mg Oral Nightly Maricel Rosales MD   80 mg at 08/28/20 2053    finasteride (PROSCAR) tablet 5 mg  5 mg Oral Daily Maricel Rosales MD   5 mg at 08/31/20 0932    heparin (porcine) injection 5,000 Units  5,000 Units Subcutaneous 3 times per day Maricel Rosales MD   5,000 Units at 08/31/20 0615    levothyroxine (SYNTHROID) tablet 50 mcg  50 mcg Oral Daily Maricel Rosales MD   50 mcg at 08/29/20 0602    ondansetron (ZOFRAN) tablet 4 mg  4 mg Oral Q6H PRN Maricel Rosales MD        sodium chloride flush 0.9 % injection 10 mL  10 mL Intravenous 2 times per day Rebeca Pineda MD   10 mL at 08/31/20 5959    sodium chloride flush 0.9 % injection 10 mL  10 mL Intravenous PRN Rebeca Pineda MD        acetaminophen (TYLENOL) tablet 650 mg  650 mg Oral Q6H PRN Rebeca Pineda MD        Or    acetaminophen (TYLENOL) suppository 650 mg  650 mg Rectal Q6H PRN Joaquin Gotti MD        polyethylene glycol (GLYCOLAX) packet 17 g  17 g Oral Daily PRN Joaquin Gotti MD        promethazine (PHENERGAN) tablet 12.5 mg  12.5 mg Oral Q6H PRN Joaquin Gotti MD        Or    ondansetron [facility]) injection 4 mg  4 mg Intravenous Q6H PRN Joaquin Gotti MD   4 mg at 08/31/20 0409    ipratropium-albuterol (DUONEB) nebulizer solution 3 mL  1 vial Inhalation Q6H PRN Joaquin Gotti MD   3 mL at 08/28/20 1838    traMADol (ULTRAM) tablet 25 mg  25 mg Oral Q6H PRN Joaquin Gotti MD        labetalol (NORMODYNE;TRANDATE) injection syringe 10 mg  10 mg Intravenous Q6H PRN Joaquin Gotti MD   10 mg at 08/30/20 1548    tamsulosin (FLOMAX) capsule 0.4 mg  0.4 mg Oral Nightly Bernadine Spencer DO   0.4 mg at 08/28/20 2053       Allergies:  No Known Allergies    Problem List:    Patient Active Problem List   Diagnosis Code    Hypertension I10    Hyperlipidemia E78.5    CAD (coronary artery disease) I25.10    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 symptoms N40.1, N13.8    Arthritis M19.90    Vitamin D deficiency E55.9    Anemia D64.9    Hypothyroid E03.9    Benign nodular prostatic hyperplasia with lower urinary tract symptoms N40.1    Benign essential HTN I10    Vertigo R42    Ataxia R27.0    Bilateral carotid artery disease (HCC) I73.9    Dyspepsia and disorder of function of stomach K31.9, R10.13    Rash and nonspecific skin eruption R21    Closed left hip fracture, initial encounter ([address] S72.002A    Fall W19. XXXA    Chronic diastolic CHF (congestive heart failure) ([facility]) I50.32    Bilateral lower extremity edema R60.0    [facility] N17.9    Hyponatremia E87.1    Achalasia K22.0    Cervical spondylosis M47.812    Multiple falls R29.6    Acute respiratory failure with hypoxia ([facility]) J96.01    Urinary retention R33.9    Aspiration pneumonitis (HCC) J69.0    Oropharyngeal dysphagia R13.12    Gross hematuria R31.0    Aspiration pneumonia of left lower lobe due to gastric secretions (HCC) J69.0       Past Medical History:        Diagnosis Date    Achalasia     Arthritis     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    CAD (coronary artery disease)     stents x 3 - dr han   [facility] Cervical spondylosis     DVT (deep venous thrombosis) ([facility]) 2003    right leg    Hemorrhoids     Hyperlipidemia     Hypertension     PUD (peptic ulcer disease)     Sciatica     War injury due to shrapnel     leg       Past Surgical History:        Procedure Laterality Date    APPENDECTOMY      [facility]    CORONARY ANGIOPLASTY WITH STENT PLACEMENT      CYSTOSCOPY N/A 8/28/2020    CYSTOSCOPY AND EVACUATION OF CLOTS performed by Sterling Dexter MD at [facility] 197      inguinal    HIP SURGERY Left 8/22/2020    LEFT HIP PERCUTANUOUS PINNING performed by Nick Kessler DO at [address] Left     laser repair   [address]  2018    Dr. Mulligan, Botox       Social History:    Social History     Tobacco Use    Smoking status: Never Smoker    Smokeless tobacco: Never Used   Substance Use Topics    Alcohol use: No                                Counseling given: Not Answered      Vital Signs (Current): There were no vitals filed for this visit.                                            BP Readings from Last 3 Encounters:   08/31/20 137/85   08/28/20 (!) 170/101   08/27/20 (!) 158/60       NPO Status:                                                                                 BMI:   Wt Readings from Last 3 Encounters:   08/28/20 184 lb (83.5 kg)   08/26/20 179 lb (81.2 kg)   08/22/20 186 lb 11.2 oz (84.7 kg)     There is no height or weight on file to calculate BMI.    CBC:   Lab Results   Component Value Date    WBC 15.4 08/31/2020    RBC 2.95 08/31/2020    HGB 9.5 08/31/2020    HCT 28.3 08/31/2020    MCV oxygen requirement. Patient was started on Avelox with concern for aspiration pneumonitis\". Endo/Other:    (+) hypothyroidism::., .                  ROS comment: Recent admission for cysto clot evacuation s/p hip hemiarthroplasty all since 8/22/20 Abdominal:       Abdomen: tender. Vascular:   + DVT, . Anesthesia Plan      general     ASA 4       Induction: intravenous. MIPS: Postoperative opioids intended and Prophylactic antiemetics administered. Anesthetic plan and risks discussed with patient. Use of blood products discussed with patient whom consented to blood products. Plan discussed with attending and CRNA.     Attending anesthesiologist reviewed and agrees with Sal Tracy MD   8/31/2020

## 2020-08-31 NOTE — PROGRESS NOTES
RESPIRATORY THERAPY ASSESSMENT    Name:  Sunil Wright  Medical Record Number:  [id number]  Age: 80 y.o. Gender: male  : 1925  Today's Date:  2020  Room:  [facility]5310-01    Assessment     Is the patient being admitted for a COPD or Asthma exacerbation? No   (If yes the patient will be seen every 4 hours for the first 24 hours and then reassessed)    Patient Admission Diagnosis - Acute renal failure      Allergies  No Known Allergies    Minimum Predicted Vital Capacity:     1210                  Pulmonary History:No history  Home Oxygen Therapy:  room air  Home Respiratory Therapy:Albuterol/Ipratropium Bromide HHN   Current Respiratory Therapy:  HHN Duoneb Q6  Treatment Type: HHN  Medications: Albuterol/Ipratropium    Respiratory Severity Index(RSI)   Patients with orders for inhalation medications, oxygen, or any therapeutic treatment modality will be placed on Respiratory Protocol. They will be assessed with the first treatment and at least every 72 hours thereafter. The following severity scale will be used to determine frequency of treatment intervention.     Smoking History: No Smoking History = 0    Social History  Social History     Tobacco Use    Smoking status: Never Smoker    Smokeless tobacco: Never Used   Substance Use Topics    Alcohol use: No    Drug use: No       Recent Surgical History: None = 0  Past Surgical History  Past Surgical History:   Procedure Laterality Date    APPENDECTOMY      [facility]    CORONARY ANGIOPLASTY WITH STENT PLACEMENT      CYSTOSCOPY N/A 2020    CYSTOSCOPY AND EVACUATION OF CLOTS performed by Flo Dumas MD at [facility] 197      inguinal    HIP SURGERY Left 2020    LEFT HIP PERCUTANUOUS PINNING performed by Vernell Pyle DO at [address] Left     laser repair   Ernesto Patel  2018    Dr. Layne Torres, Botox   [address] [facility] GASTROINTESTINAL ENDOSCOPY N/A 8/31/2020    EGD SUBMUCOSAL/BOTOX INJECTION performed by Eli Molina MD at [address] N/A 8/31/2020    EGD DILATION BALLOON performed by Eli Molina MD at [facility] ENDOSCOPY       Level of Consciousness: Alert, Oriented, and Cooperative = 0    Level of Activity: Walking with assistance = 1    Respiratory Pattern: Regular Pattern; RR 8-20 = 0    Breath Sounds: Clear = 0    Sputum   ,  ,    Cough: Strong, spontaneous, non-productive = 0    Vital Signs   /69   Pulse 94   Temp 98.3 °F (36.8 °C) (Oral)   Resp 14   Ht 6' 2\" (1.88 m)   Wt 184 lb (83.5 kg)   SpO2 96%   BMI 23.62 kg/m²   SPO2 (COPD values may differ): Greater than or equal to 92% on room air = 0    Peak Flow (asthma only): not applicable = 0    RSI: 0-4 = See once and convert to home regimen or discontinue        Plan       Goals: medication delivery    Patient/caregiver was educated on the proper method of use for Respiratory Care Devices:  Yes      Level of patient/caregiver understanding able to:   ? Verbalize understanding   ? Demonstrate understanding       ? Teach back        ? Needs reinforcement       ? No available caregiver               ? Other:     Response to education:  Good     Is patient being placed on Home Treatment Regimen? Yes     Does the patient have everything they need prior to discharge? NA     Comments: spoke with patient, he states that he doesn't take nebs. Plan of Care: SAMANTHA Gomez. Electronically signed by Edyta Macdonald RCP on 8/31/2020 at 7:57 PM    Respiratory Protocol Guidelines     1. Assessment and treatment by Respiratory Therapy will be initiated for medication and therapeutic interventions upon initiation of aerosolized medication. 2. Physician will be contacted for respiratory rate (RR) greater than 35 breaths per minute.  Therapy will be held for heart rate (HR) greater than 140 beats per minute, pending direction from physician. 3. Bronchodilators will be administered via Metered Dose Inhaler (MDI) with spacer when the following criteria are met:  a. Alert and cooperative     b. HR < 140 bpm  c. RR < 30 bpm                d. Can demonstrate a 2-3 second inspiratory hold  4. Bronchodilators will be administered via Hand Held Nebulizer [facility]) to patients when ANY of the following criteria are met  a. Incognizant or uncooperative          b. Patients treated with HHN at Home        c. Unable to demonstrate proper use of MDI with spacer     d. RR > 30 bpm   5. Bronchodilators will be delivered via Metered Dose Inhaler (MDI), HHN, Aerogen to intubated patients on mechanical ventilation. 6. Inhalation medication orders will be delivered and/or substituted as outlined below. Aerosolized Medications Ordering and Administration Guidelines:    1. All Medications will be ordered by a physician, and their frequency and/or modality will be adjusted as defined by the patients Respiratory Severity Index (RSI) score. 2. If the patient does not have documented COPD, consider discontinuing anticholinergics when RSI is less than 9.  3. If the bronchospasm worsens (increased RSI), then the bronchodilator frequency can be increased to a maximum of every 4 hours. If greater than every 4 hours is required, the physician will be contacted. 4. If the bronchospasm improves, the frequency of the bronchodilator can be decreased, based on the patient's RSI, but not less than home treatment regimen frequency. 5. Bronchodilator(s) will be discontinued if patient has a RSI less than 9 and has received no scheduled or as needed treatment for 72  Hrs. Patients Ordered on a Mucolytic Agent:    1. Must always be administered with a bronchodilator. 2. Discontinue if patient experiences worsened bronchospasm, or secretions have lessened to the point that the patient is able to clear them with a cough.     Anti-inflammatory and Combination Medications:    1. If the patient lacks prior history of lung disease, is not using inhaled anti-inflammatory medication at home, and lacks wheezing by examination or by history for at least 24 hours, contact physician for possible discontinuation.

## 2020-08-31 NOTE — PROGRESS NOTES
[address] [place] [address]   Pre-operative History and Physical    Patient: Clifton Gupta  : 1925     History Obtained From:  patient, electronic medical record    HISTORY OF PRESENT ILLNESS:    The patient is a 80 y.o. male who presents for an EGD for botox. Past Medical History:        Diagnosis Date    Achalasia     Arthritis     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    CAD (coronary artery disease)     stents x 3 - dr guille Barragan Cervical spondylosis     DVT (deep venous thrombosis) ([facility]     right leg    Hemorrhoids     Hyperlipidemia     Hypertension     PUD (peptic ulcer disease)     Sciatica     War injury due to shrapnel     leg     Past Surgical History:        Procedure Laterality Date    APPENDECTOMY      [facility]    CORONARY ANGIOPLASTY WITH STENT PLACEMENT      CYSTOSCOPY N/A 2020    CYSTOSCOPY AND EVACUATION OF CLOTS performed by Rosa Murillo MD at [facility] 197      inguinal    HIP SURGERY Left 2020    LEFT HIP PERCUTANUOUS PINNING performed by Edita Hernandez DO at [address] Left     laser repair   [address]      Dr. Lala Guzman, Botox     Medications Prior to Admission:   No current facility-administered medications on file prior to encounter.       Current Outpatient Medications on File Prior to Encounter   Medication Sig Dispense Refill    atorvastatin (LIPITOR) 80 MG tablet Take 80 mg by mouth nightly      tamsulosin (FLOMAX) 0.4 MG capsule Take 0.4 mg by mouth nightly      lisinopril (PRINIVIL;ZESTRIL) 20 MG tablet Take 20 mg by mouth daily      pantoprazole (PROTONIX) 40 MG tablet Take 40 mg by mouth daily      torsemide (DEMADEX) 10 MG tablet Take 10 mg by mouth daily      ipratropium-albuterol (DUONEB) 0.5-2.5 (3) MG/3ML SOLN nebulizer solution Inhale 1 vial into the lungs every 6 hours      ipratropium-albuterol (DUONEB) 0.5-2.5 (3) MG/3ML SOLN nebulizer solution Inhale 1 vial into the lungs every 6 hours as needed for Shortness of Breath      moxifloxacin (AVELOX) 400 MG tablet Take 400 mg by mouth daily      traMADol (ULTRAM) 50 MG tablet Take 50 mg by mouth every 6 hours as needed for Pain.  ondansetron (ZOFRAN) 4 MG tablet Take 4 mg by mouth every 6 hours as needed for Nausea or Vomiting      finasteride (PROSCAR) 5 MG tablet Take 5 mg by mouth daily      Heparin Sodium, Porcine, (HEPARIN, PORCINE,) 5000 UNIT/ML injection Inject 1 mL into the skin every 8 hours for 28 days 84 mL 0    levothyroxine (SYNTHROID) 50 MCG tablet Take 50 mcg by mouth Daily      clopidogrel (PLAVIX) 75 MG tablet Take 75 mg by mouth daily.  aspirin 81 MG tablet Take 81 mg by mouth daily. Allergies:  Patient has no known allergies. History of allergic reaction to anesthesia:  No    Social History:   TOBACCO:   reports that he has never smoked. He has never used smokeless tobacco.  ETOH:   reports no history of alcohol use. DRUGS:   reports no history of drug use. Family History:   History reviewed. No pertinent family history. PHYSICAL EXAM:      BP (!) 148/82   Pulse 92   Temp 98.2 °F (36.8 °C) (Oral)   Resp 16   Ht 6' 2\" (1.88 m)   Wt 184 lb (83.5 kg)   SpO2 95%   BMI 23.62 kg/m²  I        Heart:  No m/r/g +s1/s2 RRR    Lungs:  CTA bilaterally    Abdomen:  Soft, nontender, non distended; +bs    ASA Grade:  ASA 3 - Patient with moderate systemic disease with functional limitations    Mallampati Class:  Class I: Soft palate, uvula, fauces, pillars visible  __________  Class II: Soft palate, uvula, fauces visible  ______x____   Class III: Soft palate, base of uvula visible  __________  Class IV: Hard palate only visible   __________      ASSESSMENT AND PLAN:    1. Patient is a 80 y.o. male here for EGD with deep sedation  2. Procedure options, risks and benefits reviewed with patient. We specifically discussed that risks include, but are not limited to infection, bleeding, perforation, death, and missed lesions. Patient expresses understanding.

## 2020-08-31 NOTE — PROGRESS NOTES
Internal Medicine Progress Note    Admit Date: 8/28/2020  Day: 3  Diet: Diet NPO, After Midnight Exceptions are: Ice Chips    CC: hematuria    Interval history:   No acute events overnight. Good urine output, CBI showing clear fluids. Patient is unable to tolerate any po. Plans for EGD with Botox injections for achalasia today. Patient was hypernatremic at 148 and started on D5.       Medications:     Scheduled Meds:   cloNIDine  1 patch Transdermal Weekly    pantoprazole  40 mg Intravenous Daily    levofloxacin  750 mg Intravenous Q48H    metoprolol tartrate  25 mg Oral BID    aspirin  81 mg Oral Daily    atorvastatin  80 mg Oral Nightly    finasteride  5 mg Oral Daily    heparin (porcine)  5,000 Units Subcutaneous 3 times per day    levothyroxine  50 mcg Oral Daily    sodium chloride flush  10 mL Intravenous 2 times per day    tamsulosin  0.4 mg Oral Nightly     Continuous Infusions:   dextrose 75 mL/hr at 08/31/20 0928     PRN Meds:ondansetron, sodium chloride flush, acetaminophen **OR** acetaminophen, polyethylene glycol, promethazine **OR** ondansetron, ipratropium-albuterol, traMADol, labetalol    Objective:   Vitals:   T-max:  Patient Vitals for the past 8 hrs:   BP Temp Temp src Pulse Resp SpO2   08/31/20 0746 (!) 148/82 98.2 °F (36.8 °C) Oral 92 16 95 %   08/31/20 0330 (!) 145/77 98.2 °F (36.8 °C) Oral 92 16 95 %       Intake/Output Summary (Last 24 hours) at 8/31/2020 0933  Last data filed at 8/31/2020 0330  Gross per 24 hour   Intake 0 ml   Output 350 ml   Net -350 ml       Physical Exam      LABS:    CBC:   Recent Labs     08/29/20  0609 08/30/20  0505 08/31/20  0550   WBC 15.2* 17.3* 15.4*   HGB 9.8* 10.8* 9.5*   HCT 28.8* 32.1* 28.3*    250 255   MCV 95.6 95.4 96.1     Renal:    Recent Labs     08/29/20  0608 08/30/20  0505 08/31/20  0550    147* 148*   K 4.1 4.5 3.7   * 114* 116*   CO2 24 23 22   BUN 55* 45* 32*   CREATININE 1.3 1.1 1.0   GLUCOSE 123* 89 115* CALCIUM 8.1* 8.6 8.4   MG 2.90* 2.80* 2.50*   PHOS 4.4 3.3 3.2   ANIONGAP 10 10 10     Hepatic:   Recent Labs     08/29/20  0608 08/30/20  0505 08/31/20  0550   LABALBU 2.8* 2.9* 2.8*     Troponin:   Recent Labs     08/28/20  1206 08/28/20  1828 08/29/20  0012   TROPONINI 0.04* 0.04* 0.03*     BNP: No results for input(s): BNP in the last 72 hours. Lipids: No results for input(s): CHOL, HDL in the last 72 hours. Invalid input(s): LDLCALCU, TRIGLYCERIDE  ABGs:  No results for input(s): PHART, KLU1BBE, PO2ART, OSQ8HUN, BEART, THGBART, Z4YDPUWC, YRH4RXO in the last 72 hours. INR:   Recent Labs     08/28/20  1206   INR 1.05     Lactate: No results for input(s): LACTATE in the last 72 hours. Cultures:  -----------------------------------------------------------------  RAD:   CT ABDOMEN PELVIS WO CONTRAST Additional Contrast? None   Final Result   1. Evidence of bladder outlet obstruction secondary to prostatomegaly with consequential bilateral hydroureter. .   2. Hyperattenuated material within the posterior aspect of the urinary bladder is concerning for hemorrhage/mass. Direct visualization can be obtained in proper clinical settings. 3. High-density focus in the midpole of the left kidney. Differentials include high-protein cyst/neoplasm. Ultrasound of the kidney can be obtained. 4. Partially visualized soft tissue mass in the right posterior lateral chest wall, deep and separate from the right latissimus dorsi muscle. XR CHEST PORTABLE   Final Result      Interval clearing of the left lung base since prior study. No acute chest process. Assessment/Plan:   Gross hematuria 2/2 false passage 2/2 prostate enlargement  - Hx of BPH with regular straight cath complicated by bleeding likely traumatic. S/p cystoscopy and clot evacuation. No evidence of Infection, WBC in UA most likely due to trauma.   - Urology consulted, recs appreciated  - gonzales catheter for at least one week  - CBI with clear drainage  - pain management; tramadol 25mg q6h PRN     Aspiration pneumonitis (HCC)  Cough, reported increased oxygen requirement at SNF but today on RA + leukocytosis + cr finding. Was started on Avelox 400 [facility] physician. Improved CXR finding today. - continue Levofloxacin; pharmacy to renally adjust  - continue DuoNeb PRN SOB/Wheezing     MULU (resolved) - 1.4 on admission (baseline 1.0 - 1.1).  Likely obstructive given urinary retention.   - monitor  - strict intake/output  - hold diuretics and lisinporol     HFpEF  Not in actue excerebration. ProBNP elevated 2980 (from 1925 8 days ago) likely 2/2 volume overload caused by urinary retention. Last ECHO 8/20/2020 EF 50%  - hold home ACE, diuretics until MULU improves, will restart as indicated  - strict I/O, daily weights      HTN  BP stable. On home lisinopril.   - will hold off ACE for now  - continue metoprolol 25 po BID  - labetalol PRN for SBP>180     Closed left hip fracture  S/p fixation previous admission  - PT/OT  - tramadol 25 mg q8h for pain     Achalasia  Hx of aspiration, regurgitation. S/p multiple procedures in the past including EGD with Botox injection. - continue NPO  - continue treatment with SLP  - will undergo EGD with Botox injections today     Leukocytosis - Likely reactive. Lactic acid 1.5. WBC 20.7 from 10.2 7 days ago. Afebrile. No evidence of infection. - will monitor for now, on Levofloxacin for pneumonitits  - blood and urine culturer NGTD      Urinary retention - 2/2 BPH. Has been having straight cath while at [facility]. No UTI, patien ton Abx for pnuemonitis  - flomax 0.4 mg   - finasteride   - plan as above      CAD - No symptoms. - monitor     Hypothyroidism - On home synthroid.    - continue home synthroid      Code Status:Limited  FEN: Diet NPO, After Midnight Exceptions are: Ice Chips  PPX:  Lovenox  DISPO: [facility]    Nataly Baum, PGY-1  08/31/20  9:33 AM    This patient will be staffed and discussed with Lyric Pierre MD. Addendum to Resident H& P/Progress note:  I have personally seen,examined and evaluated the patient. I have reviewed the current history, physical findings, labs and assessment and plan and agree with note as documented by resident MD ( )  The patient underwent EGD; large amount of fluid and food debris were suctioned out. Botox injection into GE junction was given.    + hypernatremia; IV fluids rate was increased to 75 ml/h.     Thompson Durand MD, FACP

## 2020-08-31 NOTE — PROGRESS NOTES
Speech Language Pathology  DYSPHAGIA - HOLD    Chart reviewed, d/w RN, pt NPO for EGD today. Will follow up as pt able and schedule allows      Calvin Anna M.S./CCC-SLP #[id number]  Pg.  # [phone]

## 2020-09-01 LAB
ALBUMIN SERPL-MCNC: 2.9 G/DL (ref 3.4–5)
ANION GAP SERPL CALCULATED.3IONS-SCNC: 8 MMOL/L (ref 3–16)
BASOPHILS ABSOLUTE: 0 K/UL (ref 0–0.2)
BASOPHILS RELATIVE PERCENT: 0 %
BLOOD CULTURE, ROUTINE: NORMAL
BUN BLDV-MCNC: 24 MG/DL (ref 7–20)
CALCIUM SERPL-MCNC: 8.3 MG/DL (ref 8.3–10.6)
CHLORIDE BLD-SCNC: 110 MMOL/L (ref 99–110)
CO2: 25 MMOL/L (ref 21–32)
CREAT SERPL-MCNC: 1 MG/DL (ref 0.8–1.3)
CULTURE, BLOOD 2: NORMAL
EOSINOPHILS ABSOLUTE: 0.3 K/UL (ref 0–0.6)
EOSINOPHILS RELATIVE PERCENT: 2 %
GFR AFRICAN AMERICAN: >60
GFR NON-AFRICAN AMERICAN: >60
GLUCOSE BLD-MCNC: 120 MG/DL (ref 70–99)
HCT VFR BLD CALC: 30.2 % (ref 40.5–52.5)
HEMOGLOBIN: 10.3 G/DL (ref 13.5–17.5)
LYMPHOCYTES ABSOLUTE: 9.4 K/UL (ref 1–5.1)
LYMPHOCYTES RELATIVE PERCENT: 62 %
MAGNESIUM: 2.3 MG/DL (ref 1.8–2.4)
MCH RBC QN AUTO: 32 PG (ref 26–34)
MCHC RBC AUTO-ENTMCNC: 34 G/DL (ref 31–36)
MCV RBC AUTO: 94.1 FL (ref 80–100)
METAMYELOCYTES RELATIVE PERCENT: 1 %
MONOCYTES ABSOLUTE: 0.6 K/UL (ref 0–1.3)
MONOCYTES RELATIVE PERCENT: 4 %
NEUTROPHILS ABSOLUTE: 4.9 K/UL (ref 1.7–7.7)
NEUTROPHILS RELATIVE PERCENT: 31 %
PDW BLD-RTO: 13.4 % (ref 12.4–15.4)
PHOSPHORUS: 2.9 MG/DL (ref 2.5–4.9)
PLATELET # BLD: 272 K/UL (ref 135–450)
PMV BLD AUTO: 8.8 FL (ref 5–10.5)
POTASSIUM SERPL-SCNC: 3.7 MMOL/L (ref 3.5–5.1)
RBC # BLD: 3.21 M/UL (ref 4.2–5.9)
REPORT: NORMAL
SARS-COV-2: NOT DETECTED
SODIUM BLD-SCNC: 143 MMOL/L (ref 136–145)
THIS TEST SENT TO: NORMAL
WBC # BLD: 15.2 K/UL (ref 4–11)

## 2020-09-01 PROCEDURE — 92526 ORAL FUNCTION THERAPY: CPT

## 2020-09-01 PROCEDURE — 2580000003 HC RX 258: Performed by: INTERNAL MEDICINE

## 2020-09-01 PROCEDURE — 6370000000 HC RX 637 (ALT 250 FOR IP): Performed by: INTERNAL MEDICINE

## 2020-09-01 PROCEDURE — 1200000000 HC SEMI PRIVATE

## 2020-09-01 PROCEDURE — C9113 INJ PANTOPRAZOLE SODIUM, VIA: HCPCS | Performed by: INTERNAL MEDICINE

## 2020-09-01 PROCEDURE — 36415 COLL VENOUS BLD VENIPUNCTURE: CPT

## 2020-09-01 PROCEDURE — 97167 OT EVAL HIGH COMPLEX 60 MIN: CPT

## 2020-09-01 PROCEDURE — 85025 COMPLETE CBC W/AUTO DIFF WBC: CPT

## 2020-09-01 PROCEDURE — 83735 ASSAY OF MAGNESIUM: CPT

## 2020-09-01 PROCEDURE — 97535 SELF CARE MNGMENT TRAINING: CPT

## 2020-09-01 PROCEDURE — 6360000002 HC RX W HCPCS: Performed by: INTERNAL MEDICINE

## 2020-09-01 PROCEDURE — 99232 SBSQ HOSP IP/OBS MODERATE 35: CPT | Performed by: HOSPITALIST

## 2020-09-01 PROCEDURE — 97530 THERAPEUTIC ACTIVITIES: CPT

## 2020-09-01 PROCEDURE — 80069 RENAL FUNCTION PANEL: CPT

## 2020-09-01 PROCEDURE — 97162 PT EVAL MOD COMPLEX 30 MIN: CPT

## 2020-09-01 RX ADMIN — HEPARIN SODIUM 5000 UNITS: 5000 INJECTION INTRAVENOUS; SUBCUTANEOUS at 22:46

## 2020-09-01 RX ADMIN — FINASTERIDE 5 MG: 5 TABLET, FILM COATED ORAL at 08:43

## 2020-09-01 RX ADMIN — ACETAMINOPHEN 650 MG: 325 TABLET ORAL at 23:13

## 2020-09-01 RX ADMIN — HEPARIN SODIUM 5000 UNITS: 5000 INJECTION INTRAVENOUS; SUBCUTANEOUS at 15:40

## 2020-09-01 RX ADMIN — HEPARIN SODIUM 5000 UNITS: 5000 INJECTION INTRAVENOUS; SUBCUTANEOUS at 06:50

## 2020-09-01 RX ADMIN — ASPIRIN 81 MG: 81 TABLET, COATED ORAL at 08:43

## 2020-09-01 RX ADMIN — LEVOTHYROXINE SODIUM 50 MCG: 50 TABLET ORAL at 06:50

## 2020-09-01 RX ADMIN — PANTOPRAZOLE SODIUM 40 MG: 40 INJECTION, POWDER, FOR SOLUTION INTRAVENOUS at 08:43

## 2020-09-01 RX ADMIN — PANTOPRAZOLE SODIUM 40 MG: 40 INJECTION, POWDER, FOR SOLUTION INTRAVENOUS at 20:15

## 2020-09-01 RX ADMIN — TAMSULOSIN HYDROCHLORIDE 0.4 MG: 0.4 CAPSULE ORAL at 20:15

## 2020-09-01 RX ADMIN — Medication 10 ML: at 20:15

## 2020-09-01 RX ADMIN — ATORVASTATIN CALCIUM 80 MG: 80 TABLET, FILM COATED ORAL at 20:15

## 2020-09-01 RX ADMIN — Medication 10 ML: at 09:00

## 2020-09-01 RX ADMIN — METOPROLOL TARTRATE 25 MG: 50 TABLET, FILM COATED ORAL at 20:15

## 2020-09-01 ASSESSMENT — PAIN DESCRIPTION - FREQUENCY
FREQUENCY: CONTINUOUS
FREQUENCY: CONTINUOUS

## 2020-09-01 ASSESSMENT — PAIN DESCRIPTION - ONSET
ONSET: PROGRESSIVE
ONSET: ON-GOING

## 2020-09-01 ASSESSMENT — PAIN DESCRIPTION - ORIENTATION: ORIENTATION: LEFT

## 2020-09-01 ASSESSMENT — PAIN DESCRIPTION - LOCATION
LOCATION: NECK
LOCATION: BUTTOCKS

## 2020-09-01 ASSESSMENT — PAIN DESCRIPTION - DESCRIPTORS
DESCRIPTORS: ACHING;DISCOMFORT;SORE
DESCRIPTORS: ACHING;DISCOMFORT

## 2020-09-01 ASSESSMENT — PAIN DESCRIPTION - PAIN TYPE
TYPE: ACUTE PAIN
TYPE: CHRONIC PAIN

## 2020-09-01 ASSESSMENT — PAIN - FUNCTIONAL ASSESSMENT: PAIN_FUNCTIONAL_ASSESSMENT: ACTIVITIES ARE NOT PREVENTED

## 2020-09-01 ASSESSMENT — PAIN DESCRIPTION - PROGRESSION: CLINICAL_PROGRESSION: NOT CHANGED

## 2020-09-01 ASSESSMENT — PAIN SCALES - GENERAL
PAINLEVEL_OUTOF10: 0
PAINLEVEL_OUTOF10: 3
PAINLEVEL_OUTOF10: 0

## 2020-09-01 NOTE — PLAN OF CARE
August 21, 2020        Benjy Mejia  [address] [facility] [address]  Phone: [phone]  Fax: [phone]             [facility]- Transplant 1st Fl  [address]  [facility] [address]  Phone: [phone]   Patient: Irving Dockery   MR Number: [id number]   YOB: 1969   Date of Visit: 8/21/2020       Dear Dr. Benjy Mejia    Thank you for referring Irving Dockrey to me for evaluation. Attached you will find relevant portions of my assessment and plan of care.    If you have questions, please do not hesitate to call me. I look forward to following Irving Dockery along with you.    Sincerely,    Iona Lynch, NP    Enclosure    If you would like to receive this communication electronically, please contact [email or web address] or [phone] to request [employer] Link access.    [employer] Link is a tool which provides read-only access to select patient information with whom you have a relationship. Its easy to use and provides real time access to review your patients record including encounter summaries, notes, results, and demographic information.    If you feel you have received this communication in error or would no longer like to receive these types of communications, please e-mail [email or web address]        Increase independence with functional mobility.

## 2020-09-01 NOTE — PROGRESS NOTES
Speech Language Pathology  Facility/Department: [facility] [address] SURGERY  Dysphagia Daily Treatment Note    NAME: Heide Bucio  : 1925  MRN: [id number]    Patient Diagnosis(es):   Patient Active Problem List    Diagnosis Date Noted    Hypernatremia     Gross hematuria     Aspiration pneumonia of left lower lobe due to gastric secretions ([address]     Multiple falls 2020    Acute respiratory failure with hypoxia ([address] 2020    Urinary retention 2020    Aspiration pneumonitis ([address] 2020    Oropharyngeal dysphagia 2020    Achalasia     Cervical spondylosis     MULU (acute kidney injury) ([address]     Hyponatremia     Fall     Chronic diastolic CHF [facility]congestive heart failure) ([facility]     Bilateral lower extremity edema     Closed left hip fracture, initial encounter ([address] 2020    Rash and nonspecific skin eruption 07/10/2018    Dyspepsia and disorder of function of stomach 2017    Vertigo 2016    Ataxia 2016    Bilateral carotid artery disease ([address] 2016    Benign essential HTN     Benign nodular prostatic hyperplasia with lower urinary tract symptoms     Hypothyroid 2014    Hypertension 10/14/2013    Vitamin D deficiency 10/14/2013    Anemia 10/14/2013    Hyperlipidemia     CAD (coronary artery disease)     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 symptoms     Arthritis      Allergies: No Known Allergies    Recent Chest Xray: 2020  Impression         Interval clearing of the left lung base since prior study.         No acute chest process.               Chart reviewed. Medical Diagnosis:MULU  Treatment Diagnosis:dysphagia     BSE Impression 20  Pt with intermittent coughing throughout examination. Prior to PO intake when pt was asked to dry swallow for OME, pt stated \"It feels like something is stuck in there. \" He then proceeded to strongly cough and produced what appeared to be a pill.  RN was immediately notified. PO intake of thin liquids and puree consistency solids appeared to be tolerated however after 5 minute delay, pt appeared to vomit what looked like apple sauce from PO trials. Pt then indicated that he is unable to keep food down. Note multiple instances of burping throughout trials. Intermittent coughing noted throughout session. Dysphagia Outcome Severity Scale: Level 1: Severe dysphagia- NPO. Unable to tolerate any PO safely       MBS results - not indicated at this time     Pain: reported mild abdominal pain- RN present and aware     Current Diet : clear liquid- 9/1- recommend soft and bite sized with thin liquids     Treatment:  Pt seen bedside to address the following goals:  1-The patient will tolerate repeat BSE when able. 9/1- goal met-  The pt underwent EGD with botox injections yesterday. Per RN, pt too medication with water without difficulty. On this date, pt alert, sitting up in bed. Pt was analyzed with trials with ice chips, water by straw (pt preference) pudding and cracker. Mastication was slow but functional with all trials. Pt demonstrated no s/s aspiration with any consistency presented. Pt able to initiate swallow without difficulty with laryngeal movement observed. Vocal quality remained clear throughout. No coughing, throat clearing or choking observed with any consistency. Pt reports improved swallowing function following yesterday's procedure. Recommend dysphagia III/soft and bite sized diet with thin liquids. New goal-  1-The patient will tolerate least restrictive diet without s/s aspiration. 2-The patient will recall and perform compensatory strategies, with no cues. 9/1- goal met-  The pt was educated to the purpose of the visit and swallowing strategies. The pt demonstrated comprehension- was sitting upright, taking small bites and sips and taking drinks to help \"wash it down\".  con't goal     Patient/Family/Caregiver Education:  .see above     Compensatory Strategies:  Upright for all PO and for 30 min after meals  Alternate between liquids and solids  Small bites/sips       Plan:  Continued daily Dysphagia treatment with goals per  plan of care. Diet recommendations: soft and bite sized with thin liquids-Make NPO if s/s aspiration emerge and alert SLP  DC recommendation:TBD closer to discharge   Treatment: 20  D/W nursing, [place] and Raghu Pickerel   Needs met prior to leaving room, call button in reach.       Prince Sharp, Christofer   Speech-Language Pathologist  Pager [phone]      If patient is discharged prior to next treatment, this note will serve as the discharge summary

## 2020-09-01 NOTE — PLAN OF CARE
Problem: Falls - Risk of:  Goal: Will remain free from falls  Description: Will remain free from falls  Note: Bed alarm on. Nonskid socks on. Bed in the lowest position. Call light in reach. Will continue to monitor. Problem: Nutrition  Goal: Optimal nutrition therapy  Note: Pt tolerating clear liquad diet. Will continue to monitor. Problem: Skin Integrity:  Goal: Will show no infection signs and symptoms  Description: Will show no infection signs and symptoms  Note: Patients dressings change. Educated pt on skin. Will continue to monitor.

## 2020-09-01 NOTE — PROGRESS NOTES
Patient is alert and oriented. Patient VSS and afebrile. Patient has stated no pain. Patient has gonzales in place draining tea colored urine. Patient had 14 beats of vtach asymptomatic and has since been in NSR with PVC's. MD notified no new orders placed. Patient has stated no needs at this time. Will continue to monitor.
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
## 2020-09-01 NOTE — PROGRESS NOTES
Physical Therapy    Facility/Department: [facility]'[address]  Initial Assessment    NAME: Aida Alexander  : 1925  MRN: [id number]    Date of Service: 2020    Discharge Recommendations:  Aida Alexander scored a 6/24 on the AM-PAC short mobility form. Current research shows that an AM-PAC score of 17 or less is typically not associated with a discharge to the patient's home setting. Based on the patient's AM-PAC score and their current functional mobility deficits, it is recommended that the patient have 3-5 sessions per week of Physical Therapy at d/c to increase the patient's independence. Please see assessment section for further patient specific details. If patient discharges prior to next session this note will serve as a discharge summary. Please see below for the latest assessment towards goals. Subacute/Skilled Nursing Facility   PT Equipment Recommendations  Equipment Needed: (defer)    Assessment   Body structures, Functions, Activity limitations: Decreased functional mobility ; Decreased strength;Decreased endurance;Decreased balance  Assessment: Pt presents with decreased independence with functional mobility and decreased tolerance to activity. Pt requires max assist of 2 to perfrom bed mobility and transfers. Pt would benefit from continued skilled therapy to address deficits listed above. Rec d/c to SNF for continued therapy and defer equipment rec at this time. Will continue to follow. Treatment Diagnosis: decreased independence with functional mobility  Prognosis: Good  Decision Making: Medium Complexity  PT Education: Goals;PT Role;Plan of Care;Transfer Training;Functional Mobility Training  Patient Education: Pt receptive to education  REQUIRES PT FOLLOW UP: Yes  Activity Tolerance  Activity Tolerance: Patient limited by endurance; Patient limited by fatigue       Patient Diagnosis(es): The primary encounter diagnosis was Hematuria, unspecified type.  A diagnosis of Acute renal failure, unspecified acute renal failure type [facility]) was also pertinent to this visit. has a past medical history of Achalasia, Arthritis, 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 symptoms, CAD (coronary artery disease), Cervical spondylosis, DVT (deep venous thrombosis) ([address] Hemorrhoids, Hyperlipidemia, Hypertension, PUD (peptic ulcer disease), Sciatica, and War injury due to shrapnel.   has a past surgical history that includes Cataract removal (Bilateral); Coronary angioplasty with stent; Hemorrhoid surgery; Appendectomy; hernia repair; repair retinal tear/hole (Left); hip surgery (Left, 8/22/2020); Upper gastrointestinal endoscopy (2018); Cystoscopy (N/A, 8/28/2020); Upper gastrointestinal endoscopy (N/A, 8/31/2020); and Upper gastrointestinal endoscopy (N/A, 8/31/2020). Restrictions  Restrictions/Precautions  Restrictions/Precautions: Weight Bearing  Lower Extremity Weight Bearing Restrictions  Left Lower Extremity Weight Bearing: Weight Bearing As Tolerated  Position Activity Restriction  Other position/activity restrictions: up with assist, clear liquid, WBAT L hip. Vision/Hearing  Vision: Impaired  Vision Exceptions: Wears glasses at all times  Hearing: Exceptions to [facility]  Hearing Exceptions: Hard of hearing/hearing concerns     Subjective  General  Chart Reviewed: Yes  Patient assessed for rehabilitation services?: Yes  Additional Pertinent Hx: Admitted 8/28. Pt is a 81 y/o male readmitted for hematuria from [facility]. PMH: CAD, CHF, HTN, recent fall causing left femoral neck fracture (required pinning)  Family / Caregiver Present: No  Referring Practitioner: Trecia Duverney MD  Diagnosis: MULU  Follows Commands: Within Functional Limits  Other (Comment): hard of hearing  Subjective  Subjective: Pt supine in bed upon arrival and agreeable to evaluation. Pt reports that getting out of bed feels good.   Pain Screening  Patient Currently in Pain: Yes  Vital Signs  Patient Currently in Pain: Yes Social/Functional History  Social/Functional History  Lives With: Son(2 sons)  Type of Home: (condo)  Home Layout: One level  Home Access: Stairs to enter without rails  Entrance Stairs - Number of Steps: 1  Bathroom Shower/Tub: Walk-in shower  Bathroom Toilet: Handicap height  Bathroom Equipment: Grab bars in shower, Grab bars around toilet, Shower chair  Home Equipment: Rolling walker, Hospital bed, Grab bars, Lift chair  ADL Assistance: Independent  Homemaking Responsibilities: No  Ambulation Assistance: Independent  Transfer Assistance: Independent  Active : No  IADL Comments: sons perform IADLs. Additional Comments: Pt reports 3-4 falls in the last year.   Objective          AROM RLE (degrees)  RLE AROM: WFL  AROM LLE (degrees)  LLE AROM : WFL  Strength RLE  Strength RLE: WFL  R Hip Flexion: 4+/5  R Knee Flexion: 4+/5  R Knee Extension: 5/5  R Ankle Dorsiflexion: 5/5  Strength LLE  L Hip Flexion: 4+/5  L Knee Flexion: 4+/5  L Knee Extension: 4+/5  L Ankle Dorsiflexion: 5/5     Sensation  Overall Sensation Status: Impaired(Pt states light touch on BLEs \"feels different\")  Bed mobility  Supine to Sit: Maximum assistance;2 Person assistance(verbal cues for sequencing)  Transfers  Sit to Stand: Maximum Assistance;2 Person Assistance(performed partial stand to RW, unable to achieve upright posture)  Bed to Chair: Dependent/Total(isaac issa)        Balance  Comments: Pt requires CGA for static sitting EOB x8 min (initially required min assist)      Plan   Plan  Times per week: 2-5  Times per day: Daily  Current Treatment Recommendations: Strengthening, Safety Education & Training, Balance Training, Endurance Training, Functional Mobility Training, Patient/Caregiver Education & Training, Equipment Evaluation, Education, & procurement, Transfer Training, Gait Training  Safety Devices  Type of devices: Call light within reach, Chair alarm in place, Gait belt, Nurse notified, Left in chair    AM-PAC Score  AM-PAC Inpatient Mobility Raw Score : 6 (09/01/20 1546)  AM-PAC Inpatient T-Scale Score : 23.55 (09/01/20 1546)  Mobility Inpatient CMS 0-100% Score: 100 (09/01/20 1546)  Mobility Inpatient CMS G-Code Modifier : CN (09/01/20 1546)        Goals  Short term goals  Time Frame for Short term goals: discharge  Short term goal 1: Pt will perform bed mobility with mod assist of one to increase independence with functional mobility. Short term goal 2: Pt will perform sit<-->stand to LRAD with mod assist in order to prepare for transfers bed<-->chair. Short term goal 3: Pt will transfer bed<-->chair using stand pivot transfer and mod assist in order to increase mobility around the home. Patient Goals   Patient goals : to return home       Therapy Time   Individual Concurrent Group Co-treatment   Time In 8090         Time Out 1544         Minutes 27                 Timed code treatment minutes: 12    Total treatment time: 27    If patient discharges prior to next session this note will serve as a discharge summary. Please see below for the latest assessment towards goals. Sarah Smith, Student PT   Addendum:  Therapist was present, directed the patient's care, made skilled judgement, and was responsible for assessment and treatment of the patient.   Mayo Sweeney PT [id number]

## 2020-09-01 NOTE — CARE COORDINATION
CM following, FC in place off CBI urine clear plan VT 1 week, cont ABX and flomax. S/P EGD with Botox injection 8/31. Plans to DC back to [facility] evals to return pending COVID test sent 8/30/20. No precert required for DC to [facility].    Electronically signed by Tierney Haynes RN on 9/1/2020 at 2:09 PM  [phone]

## 2020-09-01 NOTE — PROGRESS NOTES
Internal Medicine Progress Note    Admit Date: 8/28/2020  Day: 4  Diet: DIET CLEAR LIQUID; Dysphagia Soft and Bite-Sized    CC: hematuria    Interval history:   No acute events overnight. Good urine output. Chambers in place. Patient is able to tolerate po today. Patient underwent EGD with Botox injections yesterday. Patient has no complaints and ready to go home. Medications:     Scheduled Meds:   pantoprazole  40 mg Intravenous BID    cloNIDine  1 patch Transdermal Weekly    levofloxacin  750 mg Intravenous Q48H    metoprolol tartrate  25 mg Oral BID    aspirin  81 mg Oral Daily    atorvastatin  80 mg Oral Nightly    finasteride  5 mg Oral Daily    heparin (porcine)  5,000 Units Subcutaneous 3 times per day    levothyroxine  50 mcg Oral Daily    sodium chloride flush  10 mL Intravenous 2 times per day    tamsulosin  0.4 mg Oral Nightly     Continuous Infusions:   dextrose 75 mL/hr at 08/31/20 0928     PRN Meds:ipratropium-albuterol, ondansetron, sodium chloride flush, acetaminophen **OR** acetaminophen, polyethylene glycol, promethazine **OR** ondansetron, traMADol, labetalol    Objective:   Vitals:   T-max:  Patient Vitals for the past 8 hrs:   BP Temp Temp src Pulse Resp SpO2 Weight   09/01/20 0819 113/62 98 °F (36.7 °C) Oral 61 18 93 % --   09/01/20 0657 -- -- -- -- -- -- 187 lb 6.3 oz (85 kg)   09/01/20 0321 134/64 97.4 °F (36.3 °C) Oral 68 16 92 % --       Intake/Output Summary (Last 24 hours) at 9/1/2020 1054  Last data filed at 9/1/2020 0315  Gross per 24 hour   Intake 0 ml   Output 400 ml   Net -400 ml       Physical Exam  Constitutional:       Appearance: Normal appearance. Cardiovascular:      Rate and Rhythm: Normal rate and regular rhythm. Pulses: Normal pulses. Heart sounds: Normal heart sounds. Pulmonary:      Effort: Pulmonary effort is normal.      Breath sounds: Normal breath sounds.    Abdominal:      General: Bowel sounds are normal.      Palpations: Abdomen is likely traumatic. S/p cystoscopy and clot evacuation. No evidence of Infection, WBC in UA most likely due to trauma.  - Urology consulted, recs appreciated  - gonzales catheter for at least one week  - CBI with clear drainage  - pain management; tramadol 25mg q6h PRN     Aspiration pneumonitis (HCC)  Cough, reported increased oxygen requirement at [facility] but today on RA + leukocytosis + cr finding. Was started on Avelox [facility] physician. Improved CXR finding today. - continue Levofloxacin; pharmacy to renally adjust  - continue DuoNeb PRN SOB/Wheezing     MULU (resolved) - 1.4 on admission (baseline 1.0 - 1.1).  Likely obstructive given urinary retention.   - monitor  - strict intake/output  - hold diuretics and lisinporol     HFpEF  Not in actue excerebration. ProBNP elevated 2980 (from 1925 8 days ago) likely 2/2 volume overload caused by urinary retention. Last ECHO 8/20/2020 EF 50%  - hold home ACE, diuretics until MULU improves, will restart as indicated  - strict I/O, daily weights      HTN  BP stable. On home lisinopril.   - will hold off ACE for now  - continue metoprolol 25 po BID  - labetalol PRN for SBP>180     Closed left hip fracture  S/p fixation previous admission  - PT/OT  - tramadol 25 mg q8h for pain     Achalasia  Hx of aspiration, regurgitation. S/p multiple procedures in the past including EGD with Botox injection. - continue NPO  - continue treatment with SLP  - will undergo EGD with Botox injections today     Leukocytosis - Likely reactive. Lactic acid 1.5. WBC 20.7 from 10.2 7 days ago. Afebrile. No evidence of infection. - will monitor for now, on Levofloxacin for pneumonitits  - blood and urine culturer NGTD      Urinary retention - 2/2 BPH. Has been having straight cath while at [facility]. No UTI, patien ton Abx for pnuemonitis  - flomax 0.4 mg   - finasteride   - plan as above      CAD - No symptoms.  - monitor     Hypothyroidism - On home synthroid.    - continue home synthroid        Code Status:Limited  FEN: Diet NPO, After Midnight Exceptions are: Ice Chips  PPX:  Lovenox  DISPO: [facility]    Bridgette Regalado, PGY-1  09/01/20  10:54 AM    This patient will be staffed and discussed with Susan Lauren MD.     Addendum to Resident H& P/Progress note:  I have personally seen,examined and evaluated the patient. I have reviewed the current history, physical findings, labs and assessment and plan and agree with note as documented by resident MD ( )  Discussed the patient's condition with Magdy Srivastava, nurse practitioner for urology team.  Recommendations and treatment plan was reviewed. The patient also had speech pathology evaluation, soft and bite sized diet with thin liquids was recommended. The patient will require physical and occupational therapy evaluation and treatment in order to be transferred back to skilled nursing facility. No precertification for SNF placement is required.     Susan Lauren MD, FACP

## 2020-09-01 NOTE — PLAN OF CARE
Problem: Coping:  Goal: Expressions of feelings of enhanced comfort will increase  Description: Expressions of feelings of enhanced comfort will increase  9/1/2020 0151 by Nik Mercado  Outcome: Ongoing  Note: Patient has stated that he is comfortable. Patient has stated no pain. Will continue to monitor. Problem: Urinary Elimination:  Goal: Ability to achieve a balanced intake and output will improve  Description: Ability to achieve a balanced intake and output will improve  Outcome: Ongoing  Note: Patient has gonzales catheter in place. Patient has been drinking fluids and has IV fluids in place. Will continue to monitor.

## 2020-09-01 NOTE — PROGRESS NOTES
Urology Attending Progress Note      Subjective: No  complaints    Vitals:  /62   Pulse 61   Temp 98 °F (36.7 °C) (Oral)   Resp 18   Ht 6' 2\" (1.88 m)   Wt 187 lb 6.3 oz (85 kg)   SpO2 93%   BMI 24.06 kg/m²   Temp  Av.1 °F (36.7 °C)  Min: 97.4 °F (36.3 °C)  Max: 98.6 °F (37 °C)    Intake/Output Summary (Last 24 hours) at 2020 1057  Last data filed at 2020 0315  Gross per 24 hour   Intake 0 ml   Output 400 ml   Net -400 ml       Exam: abd soft, gonzales draining clear urine. Labs:  WBC:    Lab Results   Component Value Date    WBC 15.2 2020     Hemoglobin/Hematocrit:    Lab Results   Component Value Date    HGB 10.3 2020    HCT 30.2 2020     BMP:    Lab Results   Component Value Date     2020    K 3.7 2020    K 3.9 2020     2020    CO2 25 2020    BUN 24 2020    LABALBU 2.9 2020    CREATININE 1.0 2020    CALCIUM 8.3 2020    GFRAA >60 2020    GFRAA >60 2012    LABGLOM >60 2020     PT/INR:    Lab Results   Component Value Date    PROTIME 12.2 2020    INR 1.05 2020     PTT:  No results found for: APTT[APTT    Urine Culture:  NGTD    Blood Culture:  NGTD    Antibiotic Therapy:  Levaquin    Imaging: CT abd/pelvis 20  1. Evidence of bladder outlet obstruction secondary to prostatomegaly with consequential bilateral hydroureter. .    2. Hyperattenuated material within the posterior aspect of the urinary bladder is concerning for hemorrhage/mass. Direct visualization can be obtained in proper clinical settings. 3. High-density focus in the midpole of the left kidney. Differentials include high-protein cyst/neoplasm. Ultrasound of the kidney can be obtained.     4. Partially visualized soft tissue mass in the right posterior lateral chest wall, deep and separate from the right latissimus dorsi muscle.        Impression/Plan: 81 yo M POD#4 s/p cysto, clot evac     -gonzales draining clear urine off CBI  -Cr WNL  -continue abx  -continue flomax   -gonzales to stay for a total of one week.    -f/u with dr Lila Kawasaki in one week as outpatient     Meme Kramerr, APRN - CNP

## 2020-09-02 ENCOUNTER — APPOINTMENT (OUTPATIENT)
Dept: GENERAL RADIOLOGY | Age: 85
DRG: 698 | End: 2020-09-02
Payer: MEDICARE

## 2020-09-02 VITALS
RESPIRATION RATE: 16 BRPM | OXYGEN SATURATION: 98 % | WEIGHT: 191.8 LBS | TEMPERATURE: 98 F | DIASTOLIC BLOOD PRESSURE: 73 MMHG | BODY MASS INDEX: 24.62 KG/M2 | HEIGHT: 74 IN | HEART RATE: 80 BPM | SYSTOLIC BLOOD PRESSURE: 132 MMHG

## 2020-09-02 LAB
ACANTHOCYTES: ABNORMAL
ALBUMIN SERPL-MCNC: 2.5 G/DL (ref 3.4–5)
ANION GAP SERPL CALCULATED.3IONS-SCNC: 6 MMOL/L (ref 3–16)
ATYPICAL LYMPHOCYTE RELATIVE PERCENT: 2 % (ref 0–6)
BANDED NEUTROPHILS RELATIVE PERCENT: 1 % (ref 0–7)
BASOPHILS ABSOLUTE: 0 K/UL (ref 0–0.2)
BASOPHILS RELATIVE PERCENT: 0 %
BUN BLDV-MCNC: 23 MG/DL (ref 7–20)
BURR CELLS: ABNORMAL
CALCIUM SERPL-MCNC: 7.8 MG/DL (ref 8.3–10.6)
CHLORIDE BLD-SCNC: 104 MMOL/L (ref 99–110)
CO2: 25 MMOL/L (ref 21–32)
CREAT SERPL-MCNC: 1.1 MG/DL (ref 0.8–1.3)
EOSINOPHILS ABSOLUTE: 0.8 K/UL (ref 0–0.6)
EOSINOPHILS RELATIVE PERCENT: 5 %
GFR AFRICAN AMERICAN: >60
GFR NON-AFRICAN AMERICAN: >60
GLUCOSE BLD-MCNC: 123 MG/DL (ref 70–99)
HCT VFR BLD CALC: 27.8 % (ref 40.5–52.5)
HEMOGLOBIN: 9.4 G/DL (ref 13.5–17.5)
LYMPHOCYTES ABSOLUTE: 6.6 K/UL (ref 1–5.1)
LYMPHOCYTES RELATIVE PERCENT: 42 %
MAGNESIUM: 1.9 MG/DL (ref 1.8–2.4)
MCH RBC QN AUTO: 31.8 PG (ref 26–34)
MCHC RBC AUTO-ENTMCNC: 33.7 G/DL (ref 31–36)
MCV RBC AUTO: 94.2 FL (ref 80–100)
MONOCYTES ABSOLUTE: 0.3 K/UL (ref 0–1.3)
MONOCYTES RELATIVE PERCENT: 2 %
NEUTROPHILS ABSOLUTE: 7.4 K/UL (ref 1.7–7.7)
NEUTROPHILS RELATIVE PERCENT: 48 %
PDW BLD-RTO: 13.5 % (ref 12.4–15.4)
PHOSPHORUS: 2.7 MG/DL (ref 2.5–4.9)
PLATELET # BLD: 247 K/UL (ref 135–450)
PMV BLD AUTO: 8.7 FL (ref 5–10.5)
POIKILOCYTES: ABNORMAL
POLYCHROMASIA: ABNORMAL
POTASSIUM SERPL-SCNC: 3.4 MMOL/L (ref 3.5–5.1)
RBC # BLD: 2.96 M/UL (ref 4.2–5.9)
SODIUM BLD-SCNC: 135 MMOL/L (ref 136–145)
WBC # BLD: 15.1 K/UL (ref 4–11)

## 2020-09-02 PROCEDURE — 6360000002 HC RX W HCPCS: Performed by: STUDENT IN AN ORGANIZED HEALTH CARE EDUCATION/TRAINING PROGRAM

## 2020-09-02 PROCEDURE — 36415 COLL VENOUS BLD VENIPUNCTURE: CPT

## 2020-09-02 PROCEDURE — 80069 RENAL FUNCTION PANEL: CPT

## 2020-09-02 PROCEDURE — 92526 ORAL FUNCTION THERAPY: CPT

## 2020-09-02 PROCEDURE — 99239 HOSP IP/OBS DSCHRG MGMT >30: CPT | Performed by: HOSPITALIST

## 2020-09-02 PROCEDURE — 74230 X-RAY XM SWLNG FUNCJ C+: CPT

## 2020-09-02 PROCEDURE — 2580000003 HC RX 258: Performed by: INTERNAL MEDICINE

## 2020-09-02 PROCEDURE — 6360000002 HC RX W HCPCS: Performed by: INTERNAL MEDICINE

## 2020-09-02 PROCEDURE — 85025 COMPLETE CBC W/AUTO DIFF WBC: CPT

## 2020-09-02 PROCEDURE — C9113 INJ PANTOPRAZOLE SODIUM, VIA: HCPCS | Performed by: INTERNAL MEDICINE

## 2020-09-02 PROCEDURE — 6370000000 HC RX 637 (ALT 250 FOR IP): Performed by: INTERNAL MEDICINE

## 2020-09-02 PROCEDURE — 92611 MOTION FLUOROSCOPY/SWALLOW: CPT

## 2020-09-02 PROCEDURE — 97530 THERAPEUTIC ACTIVITIES: CPT

## 2020-09-02 PROCEDURE — 83735 ASSAY OF MAGNESIUM: CPT

## 2020-09-02 PROCEDURE — 97535 SELF CARE MNGMENT TRAINING: CPT

## 2020-09-02 PROCEDURE — 97110 THERAPEUTIC EXERCISES: CPT

## 2020-09-02 RX ORDER — LEVOFLOXACIN 250 MG/1
250 TABLET ORAL DAILY
Qty: 3 TABLET | Refills: 0 | Status: SHIPPED | OUTPATIENT
Start: 2020-09-02 | End: 2020-09-05

## 2020-09-02 RX ORDER — POTASSIUM CHLORIDE 7.45 MG/ML
10 INJECTION INTRAVENOUS
Status: COMPLETED | OUTPATIENT
Start: 2020-09-02 | End: 2020-09-02

## 2020-09-02 RX ADMIN — LEVOFLOXACIN 750 MG: 5 INJECTION, SOLUTION INTRAVENOUS at 08:49

## 2020-09-02 RX ADMIN — HEPARIN SODIUM 5000 UNITS: 5000 INJECTION INTRAVENOUS; SUBCUTANEOUS at 05:47

## 2020-09-02 RX ADMIN — HEPARIN SODIUM 5000 UNITS: 5000 INJECTION INTRAVENOUS; SUBCUTANEOUS at 13:58

## 2020-09-02 RX ADMIN — ACETAMINOPHEN 650 MG: 325 TABLET ORAL at 15:08

## 2020-09-02 RX ADMIN — POTASSIUM CHLORIDE 10 MEQ: 10 INJECTION, SOLUTION INTRAVENOUS at 12:53

## 2020-09-02 RX ADMIN — LEVOTHYROXINE SODIUM 50 MCG: 50 TABLET ORAL at 05:48

## 2020-09-02 RX ADMIN — POTASSIUM CHLORIDE 10 MEQ: 10 INJECTION, SOLUTION INTRAVENOUS at 09:57

## 2020-09-02 RX ADMIN — POTASSIUM CHLORIDE 10 MEQ: 10 INJECTION, SOLUTION INTRAVENOUS at 12:03

## 2020-09-02 RX ADMIN — POTASSIUM CHLORIDE 10 MEQ: 10 INJECTION, SOLUTION INTRAVENOUS at 08:48

## 2020-09-02 RX ADMIN — Medication 10 ML: at 08:49

## 2020-09-02 RX ADMIN — FINASTERIDE 5 MG: 5 TABLET, FILM COATED ORAL at 08:48

## 2020-09-02 RX ADMIN — DEXTROSE MONOHYDRATE: 50 INJECTION, SOLUTION INTRAVENOUS at 08:46

## 2020-09-02 RX ADMIN — PANTOPRAZOLE SODIUM 40 MG: 40 INJECTION, POWDER, FOR SOLUTION INTRAVENOUS at 10:08

## 2020-09-02 RX ADMIN — ASPIRIN 81 MG: 81 TABLET, COATED ORAL at 08:48

## 2020-09-02 ASSESSMENT — PAIN DESCRIPTION - PROGRESSION
CLINICAL_PROGRESSION: NOT CHANGED
CLINICAL_PROGRESSION: NOT CHANGED

## 2020-09-02 ASSESSMENT — PAIN - FUNCTIONAL ASSESSMENT
PAIN_FUNCTIONAL_ASSESSMENT: ACTIVITIES ARE NOT PREVENTED
PAIN_FUNCTIONAL_ASSESSMENT: ACTIVITIES ARE NOT PREVENTED

## 2020-09-02 ASSESSMENT — PAIN SCALES - GENERAL
PAINLEVEL_OUTOF10: 4
PAINLEVEL_OUTOF10: 4
PAINLEVEL_OUTOF10: 5
PAINLEVEL_OUTOF10: 0

## 2020-09-02 ASSESSMENT — PAIN DESCRIPTION - ORIENTATION
ORIENTATION: MID;LOWER
ORIENTATION: LOWER;MID
ORIENTATION: LOWER

## 2020-09-02 ASSESSMENT — PAIN DESCRIPTION - DESCRIPTORS
DESCRIPTORS: DISCOMFORT

## 2020-09-02 ASSESSMENT — PAIN DESCRIPTION - FREQUENCY
FREQUENCY: CONTINUOUS

## 2020-09-02 ASSESSMENT — PAIN DESCRIPTION - PAIN TYPE
TYPE: ACUTE PAIN

## 2020-09-02 ASSESSMENT — PAIN SCALES - WONG BAKER: WONGBAKER_NUMERICALRESPONSE: 2

## 2020-09-02 ASSESSMENT — PAIN DESCRIPTION - LOCATION
LOCATION: BACK

## 2020-09-02 ASSESSMENT — PAIN DESCRIPTION - ONSET
ONSET: ON-GOING

## 2020-09-02 NOTE — PLAN OF CARE
Problem: Falls - Risk of:  Goal: Will remain free from falls  Description: Will remain free from falls  Outcome: Met This Shift  Note: Patient weak, gait difficult. [employer] steady used to transfer. Patient up to chair, call light in reach. Chair alarm on. Bed locked in low position, alarm used when in bed. Hourly checks on needs. Goal: Absence of physical injury  Description: Absence of physical injury  9/2/2020 1243 by Dary Tomas RN  Outcome: Met This Shift  9/2/2020 0013 by Rashid Boggs  Outcome: Ongoing  Note: Patient has been transferred from the chair to the bed. Patient has been up with x2 and the stedy. Patient has bed alarm on and bed in lowest position. Call light is within reach. Will continue to monitor. Problem: Urinary Elimination:  Goal: Ability to achieve a balanced intake and output will improve  Description: Ability to achieve a balanced intake and output will improve  Outcome: Ongoing  Note: Patient with gonzales catheter, > 30 ml/hr. Urology planning to d/c tomorrow. Tolerating liquid diet. No edema. Goal: Will remain free from infection  Description: Will remain free from infection  9/2/2020 0014 by Rashid Boggs  Outcome: Ongoing  Note: Patient has gonzales catheter in place. Patient VSS and afebrile. Patient shows no signs of infection. Will continue to monitor. Problem: Skin Integrity:  Goal: Absence of new skin breakdown  Description: Absence of new skin breakdown  Note: Patient with redness buttocks and heals. Specialized bed, alternating pressure and low air loss in use. Waffle cushion while up in chair. Heals off loaded. Repositioned q 2 hours. Will monitor. Problem: Pain:  Description: Pain management should include both nonpharmacologic and pharmacologic interventions. Goal: Pain level will decrease  Description: Pain level will decrease  Note: Patient denies pain. Encouraged to notify staff of any uncontrolled pain. Will monitor.

## 2020-09-02 NOTE — PROGRESS NOTES
Patient received discharge orders. Report called to Nu Polanco at [place] [address].   Transport planned for 8 pm.

## 2020-09-02 NOTE — PROGRESS NOTES
Urology Attending Progress Note      Subjective: No  complaints     Vitals:  /60   Pulse 53   Temp 97.8 °F (36.6 °C) (Oral)   Resp 16   Ht 6' 2\" (1.88 m)   Wt 191 lb 12.8 oz (87 kg)   SpO2 94%   BMI 24.63 kg/m²   Temp  Av.1 °F (36.7 °C)  Min: 97.5 °F (36.4 °C)  Max: 98.8 °F (37.1 °C)    Intake/Output Summary (Last 24 hours) at 2020 0932  Last data filed at 2020 0331  Gross per 24 hour   Intake 250 ml   Output 875 ml   Net -625 ml       Exam: Gonzales draining clear urine    Labs:  WBC:    Lab Results   Component Value Date    WBC 15.1 2020     Hemoglobin/Hematocrit:    Lab Results   Component Value Date    HGB 9.4 2020    HCT 27.8 2020     BMP:    Lab Results   Component Value Date     2020    K 3.4 2020    K 3.9 2020     2020    CO2 25 2020    BUN 23 2020    LABALBU 2.5 2020    CREATININE 1.1 2020    CALCIUM 7.8 2020    GFRAA >60 2020    GFRAA >60 2012    LABGLOM >60 2020     PT/INR:    Lab Results   Component Value Date    PROTIME 12.2 2020    INR 1.05 2020     PTT:  No results found for: APTT[APTT    Urine Culture: NGTD    Blood Culture: NGTD    Antibiotic Therapy: Levaquin    Imaging: CT abd/pelvis 20  1. Evidence of bladder outlet obstruction secondary to prostatomegaly with consequential bilateral hydroureter. .    2. Hyperattenuated material within the posterior aspect of the urinary bladder is concerning for hemorrhage/mass. Direct visualization can be obtained in proper clinical settings. 3. High-density focus in the midpole of the left kidney. Differentials include high-protein cyst/neoplasm. Ultrasound of the kidney can be obtained.     4. Partially visualized soft tissue mass in the right posterior lateral chest wall, deep and separate from the right latissimus dorsi muscle.       Impression/Plan: 81 yo M POD#5 s/p cysto, clot evac     -gonzales draining clear urine off CBI  -Cr WNL  -continue abx  -continue flomax   -gonzales to stay for a total of one week.    -voiding trial in one to two days at [facility]  -f/u with dr Mariah Villatoro in one week as outpatient     MELISSA Rouse - CNP

## 2020-09-02 NOTE — DISCHARGE INSTR - COC
BALLOON performed by Krista Burris MD at [facility] ENDOSCOPY       Immunization History:   Immunization History   Administered Date(s) Administered    DT (pediatric) 12/18/2000    Influenza 10/09/2014    Influenza Virus Vaccine 10/12/2011    Influenza, High Dose (Fluzone 65 yrs and older) 10/14/2013, 10/16/2018    Influenza, Triv, inactivated, subunit, adjuvanted, IM (Fluad 65 yrs and older) 10/22/2019    Pneumococcal Polysaccharide ([id number]) 11/08/2006    Tdap (Boostrix, Adacel) 05/24/2016       Active Problems:  Patient Active Problem List   Diagnosis Code    Hypertension I10    Hyperlipidemia E78.5    CAD (coronary artery disease) I25.10    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 symptoms N40.1, N13.8    Arthritis M19.90    Vitamin D deficiency E55.9    Anemia D64.9    Hypothyroid E03.9    Benign nodular prostatic hyperplasia with lower urinary tract symptoms N40.1    Benign essential HTN I10    Vertigo R42    Ataxia R27.0    Bilateral carotid artery disease ([facility]) I73.9    Dyspepsia and disorder of function of stomach K31.9, R10.13    Rash and nonspecific skin eruption R21    Closed left hip fracture, initial encounter ([facility] S72.002A    Fall W19. XXXA    Chronic diastolic CHF (congestive heart failure) ([facility]) I50.32    Bilateral lower extremity edema R60.0    MULU (acute kidney injury) ([facility] N17.9    Hyponatremia E87.1    Achalasia K22.0    Cervical spondylosis M47.812    Multiple falls R29.6    Acute respiratory failure with hypoxia ([facility]) J96.01    Urinary retention R33.9    Aspiration pneumonitis ([facility]) J69.0    Oropharyngeal dysphagia R13.12    Gross hematuria R31.0    Aspiration pneumonia of left lower lobe due to gastric secretions ([facility]) J69.0    Hypernatremia E87.0       Isolation/Infection:   Isolation          No Isolation        Patient Infection Status     Infection Onset Added Last Indicated Last Indicated By Review Planned Expiration Resolved Resolved By    None Discharge:   Respiratory Treatments: ***  Oxygen Therapy:  na  Ventilator:    na    Rehab Therapies: PT/OT  Weight Bearing Status/Restrictions: FWB  Other Medical Equipment (for information only, NOT a DME order):  walker  Other Treatments: ***    Patient's personal belongings (please select all that are sent with patient):      RN SIGNATURE:  Electronically signed by Luis F Monzon RN on 9/2/20 at 3:07 PM EDT    CASE MANAGEMENT/SOCIAL WORK SECTION    Inpatient Status Date: ***    Readmission Risk Assessment Score:  Readmission Risk              Risk of Unplanned Readmission:        27           Discharging to Facility/ [address], [place] [address]       Phone: [phone]       Fax: [phone]        ·     / signature: Electronically signed by Dorota Hester RN on 9/2/20 at 1:44 PM EDT    PHYSICIAN SECTION    Prognosis: Good    Condition at Discharge: Stable    Rehab Potential (if transferring to Rehab): Good    Recommended Labs or Other Treatments After Discharge: PT TO eval and treatment daily  Fall precautions  Chambers cath care  Follow up with Urolgy group, Cabrera Rich MD in 1 week as an outpatient      Physician Certification: I certify the above information and transfer of Kathy Mackenzie  is necessary for the continuing treatment of the diagnosis listed and that he requires [facility] for less 30 days.      Update Admission H&P: No change in H&P    PHYSICIAN SIGNATURE:  Sailaja Delgado MD

## 2020-09-02 NOTE — PLAN OF CARE
Problem: Falls - Risk of:  Goal: Absence of physical injury  Description: Absence of physical injury  Outcome: Ongoing  Note: Patient has been transferred from the chair to the bed. Patient has been up with x2 and the stedy. Patient has bed alarm on and bed in lowest position. Call light is within reach. Will continue to monitor. Problem: Urinary Elimination:  Goal: Will remain free from infection  Description: Will remain free from infection  Outcome: Ongoing  Note: Patient has gonzales catheter in place. Patient VSS and afebrile. Patient shows no signs of infection. Will continue to monitor.

## 2020-09-02 NOTE — PROGRESS NOTES
Patient alert and oriented, but very [facility]. Vitals stable. Denies pain. On liquid diet, tolerating well. Patient left floor for Barium Swallow and returned. Up to chair. D5 infusing right forearm. KCL 40 meq IV ordered, infusing. Patient with gonzales catheter draining yellow urine with flecks of old blood. Chair alarm on. Call light in reach. Will monitor.

## 2020-09-02 NOTE — PROGRESS NOTES
CBI with clear drainage  - pain management; tramadol 25mg q6h PRN     Aspiration pneumonitis (HCC)  Cough, reported increased oxygen requirement at SNF but today on RA + leukocytosis + cr finding. Was started on Avelox [facility] physician. Improved CXR finding today. - continue Levofloxacin; pharmacy to renally adjust  - continue DuoNeb PRN SOB/Wheezing     MULU (resolved) - 1.4 on admission (baseline 1.0 - 1.1).  Likely obstructive given urinary retention.   - monitor  - strict intake/output  - hold diuretics and lisinporol     HFpEF  Not in actue excerebration. ProBNP elevated 2980 (from 1925 8 days ago) likely 2/2 volume overload caused by urinary retention. Last ECHO 8/20/2020 EF 50%  - hold home ACE, diuretics until MULU improves, will restart as indicated  - strict I/O, daily weights      HTN  BP stable. On home lisinopril.   - will hold off ACE for now  - continue metoprolol 25 po BID  - labetalol PRN for SBP>180     Closed left hip fracture  S/p fixation previous admission  - PT/OT  - tramadol 25 mg q8h for pain     Achalasia  Hx of aspiration, regurgitation. S/p multiple procedures in the past including EGD with Botox injection. - continue NPO  - continue treatment with SLP  - s/p EGD with Botox injections  - MBS today     Leukocytosis - Likely reactive. Lactic acid 1.5. WBC 20.7 from 10.2 7 days ago. Afebrile. No evidence of infection. - will monitor for now, on Levofloxacin for pneumonitits  - blood and urine culturer NGTD      Urinary retention - 2/2 BPH. Has been having straight cath while at [facility]. No UTI, patien ton Abx for pnuemonitis  - flomax 0.4 mg   - finasteride   - plan as above      CAD - No symptoms.  - monitor     Hypothyroidism - On home synthroid.    - continue home synthroid        Code Status:Limited  FEN: Diet NPO, After Midnight Exceptions are: Ice Chips  PPX:  Lovenox  DISPO: [facility]      Garrett Sinclair, PGY-1  09/02/20  10:51 AM    This patient will be staffed and discussed with Nick Smith Rickie Gruber MD.     Addendum to Resident H& P/Progress note:  I have personally seen,examined and evaluated the patient.  I have reviewed the current history, physical findings, labs and assessment and plan and agree with note as documented by resident MD ( Drew Farah)      Cuca Juan MD, [place]

## 2020-09-02 NOTE — PROGRESS NOTES
diagnosis of Acute renal failure, unspecified acute renal failure type [facility]) was also pertinent to this visit. has a past medical history of Achalasia, Arthritis, 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 symptoms, CAD (coronary artery disease), Cervical spondylosis, DVT (deep venous thrombosis) ([address] Hemorrhoids, Hyperlipidemia, Hypertension, PUD (peptic ulcer disease), Sciatica, and War injury due to shrapnel.   has a past surgical history that includes Cataract removal (Bilateral); Coronary angioplasty with stent; Hemorrhoid surgery; Appendectomy; hernia repair; repair retinal tear/hole (Left); hip surgery (Left, 8/22/2020); Upper gastrointestinal endoscopy (2018); Cystoscopy (N/A, 8/28/2020); Upper gastrointestinal endoscopy (N/A, 8/31/2020); and Upper gastrointestinal endoscopy (N/A, 8/31/2020). Restrictions  Restrictions/Precautions  Restrictions/Precautions: Weight Bearing  Lower Extremity Weight Bearing Restrictions  Left Lower Extremity Weight Bearing: Weight Bearing As Tolerated  Position Activity Restriction  Other position/activity restrictions: up with assist, clear liquid, WBAT L hip. Subjective   General  Chart Reviewed: Yes  Additional Pertinent Hx: Admitted 8/28. Pt is a 79 y/o male readmitted for hematuria from [facility]. PMH: CAD, CHF, HTN, recent fall causing left femoral neck fracture (required pinning)  Family / Caregiver Present: No  Referring Practitioner: Pamlea Martinez MD  Subjective  Subjective: Pt upright in chair upon arrival and agreeable to therapy.   Pain Screening  Patient Currently in Pain: Yes (not rated, RN aware)          Orientation  Orientation  Overall Orientation Status: Within Functional Limits  Objective      Transfers  Sit to Stand: Maximum Assistance;2 Person Assistance(performed partial stand to RW, unable to obtain upright posture,)  Sit to Stand: using isaac stedy, max assist of 2 from chair, max assist of 2 from raised seat in isaac stedy Ambulation  Ambulation?: No  Stairs/Curb  Stairs?: No     Balance  Comments: Pt requires supervision for static sitting at edge of chair.  Pt required max assist of 2 to maintain balance during partial stand to walker  Exercises- performed independently with demonstration and verbal cues for form   Hip Flexion: x10 ea LE, seated  Knee Long Arc Quad: x15 ea LE, seated  Ankle Pumps: x10 ea LE, reclined in chair  Comments: Pt performed self care activities with OT using isaac issa at sink                        AM-PAC Score  AM-PAC Inpatient Mobility Raw Score : 6 (09/01/20 1546)  AM-PAC Inpatient T-Scale Score : 23.55 (09/01/20 1546)  Mobility Inpatient CMS 0-100% Score: 100 (09/01/20 1546)  Mobility Inpatient CMS G-Code Modifier : CN (09/01/20 1546)          Goals  Short term goals  Time Frame for Short term goals: discharge  Short term goal 1: Pt will perform bed mobility with mod assist of one to increase independence with functional mobility.-ongoing  Short term goal 2: Pt will perform sit<-->stand to LRAD with mod assist in order to prepare for transfers bed<-->chair.-ongoing  Short term goal 3: Pt will transfer bed<-->chair using stand pivot transfer and mod assist in order to increase mobility around the home.-ongoing  Patient Goals   Patient goals : to return home    Plan    Plan  Times per week: 2-5  Times per day: Daily  Current Treatment Recommendations: Strengthening, Safety Education & Training, Balance Training, Endurance Training, Functional Mobility Training, Patient/Caregiver Education & Training, Equipment Evaluation, Education, & procurement, Transfer Training, Gait Training  Safety Devices  Type of devices: Call light within reach, Chair alarm in place, Gait belt, Left in chair, Nurse notified     Therapy Time   Individual Concurrent Group Co-treatment   Time In [id number]         Time Out 0951         Minutes 34               Timed code treatment minutes: 34    Total treatment time: 34    If patient discharges prior to next session this note will serve as a discharge summary. Please see below for the latest assessment towards goals. Sarah Alba, Student PT   Gloria Mcarthur, PT 1992  Therapist was present, directed the patient's care, made skilled judgement, and was responsible for assessment and treatment of the patient.

## 2020-09-02 NOTE — PROGRESS NOTES
Speech Language Pathology  Facility/Department: [facility] [address] SURGERY  Dysphagia Daily Treatment Note    NAME: Clinton Lincoln  : 1925  MRN: [id number]    Patient Diagnosis(es):   Patient Active Problem List    Diagnosis Date Noted    Hypernatremia     Gross hematuria     Aspiration pneumonia of left lower lobe due to gastric secretions ([address]     Multiple falls 2020    Acute respiratory failure with hypoxia ([address] 2020    Urinary retention 2020    Aspiration pneumonitis ([address] 2020    Oropharyngeal dysphagia 2020    Achalasia     Cervical spondylosis     [facility]     Hyponatremia     Fall     Chronic diastolic CHF (congestive heart failure) ([facility])     Bilateral lower extremity edema     Closed left hip fracture, initial encounter ([facility] 2020    Rash and nonspecific skin eruption 07/10/2018    Dyspepsia and disorder of function of stomach 2017    Vertigo 2016    Ataxia 2016    Bilateral carotid artery disease ([facility] 2016    Benign essential HTN     Benign nodular prostatic hyperplasia with lower urinary tract symptoms     Hypothyroid 2014    Hypertension 10/14/2013    Vitamin D deficiency 10/14/2013    Anemia 10/14/2013    Hyperlipidemia     CAD (coronary artery disease)     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 symptoms     Arthritis      Allergies: No Known Allergies    Recent Chest Xray: 2020  Impression         Interval clearing of the left lung base since prior study.         No acute chest process.       EGD results 2020  \"Upon entering the esophagus there is a large amount of fluid and food debris. The fluid was suctioned out. The pills and food were gently pushed through the EGJ into the stomach. Duodenum: Normal  Stomach: lipoma in the antrum and mild erythema. Retroflexion did not show a hiatal hernia. Esophagus: GE junction at 45cms.   No Evidence of Newsome's choking observed with any consistency. Pt reports improved swallowing function following yesterday's procedure. Recommend dysphagia III/soft and bite sized diet with thin liquids. 2-The patient will tolerate least restrictive diet without s/s aspiration. 9/2: pt analyzed with limited amount of thin liquids and trial of soft solid. Pt exhibited intermittent cough, although noted prior to PO as well. Pt demonstrated slow but effective mastication with soft solid. Recommend MBS to fully assess swallow function. Cont goal as appropriate      3-The patient will recall and perform compensatory strategies, with no cues. 9/1- goal met-  The pt was educated to the purpose of the visit and swallowing strategies. The pt demonstrated comprehension- was sitting upright, taking small bites and sips and taking drinks to help \"wash it down\". con't goal   9/2: pt educated to recommendation for MBS and rationale. Pt stated understanding and agreeable  Cont goal    Patient/Family/Caregiver Education:  as above     Compensatory Strategies:   TBD after MBS       Plan:  Continued daily Dysphagia treatment with goals per  plan of care. Diet recommendations:  TBD after MBS  MBS today  DC recommendation:TBD closer to discharge   Treatment: 10  D/W nursing, Lady Inez Winn  Needs met prior to leaving room, call button in reach. Laila Adams M.S./CCC-SLP #[id number]  Pg.  # [phone]  If patient is discharged prior to next treatment, this note will serve as the discharge summary

## 2020-09-02 NOTE — PROGRESS NOTES
NUTRITION ASSESSMENT  Admission Date: 8/28/2020     Type and Reason for Visit: Reassess    NUTRITION RECOMMENDATIONS:   1. PO Diet: Pureed per SLP and Clear liquids per MD.   2. Adding ensure Clear TID. NUTRITION ASSESSMENT:  Pt continues to be at nutritional compromise given restricted diet, currently on clear liquids per MD and puree diet per SLP. Noted possible d/c this evening. Recommending ensure clear to aid with meeting requirements while on this diet and will continue to [facility] per [facility]. Due to current CDC guidelines recommending 6-ft distancing for social isolation for COVID19 prevention, physical aspects of the malnutrition assessment were withheld at this time. MALNUTRITION ASSESSMENT  Context of Malnutrition: Acute Illness   Malnutrition Status: At risk for malnutrition (Comment)  Findings of the 6 clinical characteristics of malnutrition (Minimum of 2 out of 6 clinical characteristics is required to make the diagnosis of moderate or severe Protein Calorie Malnutrition based on AND/ASPEN Guidelines):  Energy Intake: Less than/equal to 75% of estimated energy requirements    Energy Intake Time: Greater than or equal to 5 days    Weight Loss %: No significant loss   Weight loss Time: No significant    Body Fat Loss: Unable to Assess   Body Fat Location: Unable to assess    Body Muscle Loss: Unable to Assess   Body Muscle Loss Location: Unable to assess    Fluid Accumulation: No significant    Fluid Accumulation Location: No significant     Strength: Not Performed;  Not Measured     NUTRITION DIAGNOSIS   Problem: Problem #1: Inadequate oral intake  Etiology: Swallowing Difficulty   Signs & Symptoms: Intake 0-25%    NUTRITION INTERVENTION  Food and/or Nutrient Delivery:Continue Current diet  or Start ONS   Nutrition education/counseling/coordination of care: Continue Inpatient Monitoring  or Swallow Evaluation     NUTRITION MONITORING & EVALUATION:  Evaluation:Progressing towards goal Goals:Goals: Pt will tolerate the most appropriate form of nutrition therapy this admission   Monitoring: Chewing/Swallowing , Diet advancement , Supplement Intake  or Weight      OBJECTIVE DATA:  · Nutrition-Focused Physical Findings: +1 pitting BLE edema, LBM 8/30  · Wounds Surgical Wound      Past Medical History:   Diagnosis Date    Achalasia     Arthritis     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    CAD (coronary artery disease)     stents x 3 - dr guille Smyth Cervical spondylosis     DVT (deep venous thrombosis) ([facility] 2003    right leg    Hemorrhoids     Hyperlipidemia     Hypertension     PUD (peptic ulcer disease)     Sciatica     War injury due to shrapnel     leg        ANTHROPOMETRICS  Current Height: 6' 2\" (188 cm)  Current Weight: 191 lb 12.8 oz (87 kg)    Admission weight: 184 lb (83.5 kg)  Ideal Bodyweight 190 lb   Weight Changes no significant changes noted        BMI BMI (Calculated): 24.7    Wt Readings from Last 50 Encounters:   08/28/20 184 lb (83.5 kg)   08/26/20 179 lb (81.2 kg)   08/22/20 186 lb 11.2 oz (84.7 kg)     COMPARATIVE STANDARDS  Estimated Total Kcals/Day : 24-28 Admission Bodyweight  (84 kg) 8188-0908 kcal    Estimated Total Protein (g/day) : 1-1.3 Admission Bodyweight  (84 kg)  g/day  Estimated Daily Total Fluid (ml/day): 1 mL/kcal per day     Food / Nutrition-Related History  Pre-Admission / Home Diet:  Pre-Admission/Home Diet: NPO   Home Supplements / Herbals:    none noted  Food Restrictions / Cultural Requests:    none noted    Diet Orders / Intake / Nutrition Support  Current diet/supplement order: DIET CLEAR LIQUID; Dysphagia Soft and Bite-Sized  Dietary Nutrition Supplements: Clear Liquid Oral Supplement     NSG Recorded PO:   PO Fluids P.O.: 240 mL  PO Meals PO Meals Eaten (%): 26 - 50%   PO Intake: 1-25%CLD  PO Supplement: None   PO Supplement Intake: None   IVF: none      NUTRITION RISK LEVEL: Risk Level: High     Jorge Monk [address] [place] [address],   [place]:  [phone]  Office:  [phone]

## 2020-09-02 NOTE — PROGRESS NOTES
Occupational Therapy  Facility/Department: [address]  Daily Treatment Note  NAME: Clifton Gupta  : 1925  MRN: [id number]    Date of Service: 2020    Discharge Recommendations:  3-5 sessions per week     Clifton Gupta scored a 10/24 on the AM-PAC ADL Inpatient form. Current research shows that an AM-PAC score of 17 or less is typically not associated with a discharge to the patient's home setting. Based on the patient's AM-PAC score and their current ADL deficits, it is recommended that the patient have 3-5 sessions per week of Occupational Therapy at d/c to increase the patient's independence. Please see assessment section for further patient specific details. If patient discharges prior to next session this note will serve as a discharge summary. Please see below for the latest assessment towards goals. Assessment   Performance deficits / Impairments: Decreased functional mobility ; Decreased endurance;Decreased coordination;Decreased ADL status; Decreased ROM; Decreased balance;Decreased strength;Decreased cognition  Assessment: Pt tolerated OT evaluation well. At baseline pt ambulates with RW and is Mod I with ADLs. Pt admitted from SNF where he was recieving therapy services following a fall resulting in hip fx. Pt currently requiring Max x2 people with Evetta Hymen for simple transfers and Total A to Min A with simple ADLs. Pt progressing with standing tolerance in Evetta Hymen today. Pt is far from baseline level of occupational performance. Recommend continued OT services to increase safety and independence with ADLs and functional transfers. AMPAC score reflects NON-homebound discharge criteria. OT Education: OT Role;Plan of Care;Transfer Training;ADL Adaptive Strategies; Equipment  Patient Education: safety with transfers  Barriers to Learning: pt demonstrates and verbalizes understanding.   REQUIRES OT FOLLOW UP: Yes      Activity Tolerance  Activity Tolerance: Patient Tolerated treatment well  Activity Tolerance: Pt with c/o dizziness standing in Thomos Pat. /59 seated in chair  Safety Devices  Safety Devices in place: Yes  Type of devices: Call light within reach;Nurse notified; Left in chair;Chair alarm in place         Patient Diagnosis(es): The primary encounter diagnosis was Hematuria, unspecified type. A diagnosis of Acute renal failure, unspecified acute renal failure type [facility]) was also pertinent to this visit. has a past medical history of Achalasia, Arthritis, 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 symptoms, CAD (coronary artery disease), Cervical spondylosis, DVT (deep venous thrombosis) ([facility] Hemorrhoids, Hyperlipidemia, Hypertension, PUD (peptic ulcer disease), Sciatica, and War injury due to shrapnel.   has a past surgical history that includes Cataract removal (Bilateral); Coronary angioplasty with stent; Hemorrhoid surgery; Appendectomy; hernia repair; repair retinal tear/hole (Left); hip surgery (Left, 8/22/2020); Upper gastrointestinal endoscopy (2018); Cystoscopy (N/A, 8/28/2020); Upper gastrointestinal endoscopy (N/A, 8/31/2020); and Upper gastrointestinal endoscopy (N/A, 8/31/2020). Restrictions  Restrictions/Precautions  Restrictions/Precautions: Weight Bearing  Lower Extremity Weight Bearing Restrictions  Left Lower Extremity Weight Bearing: Weight Bearing As Tolerated  Position Activity Restriction  Other position/activity restrictions: up with assist, clear liquid, WBAT L hip. Subjective   General  Chart Reviewed: Yes  Patient assessed for rehabilitation services?: Yes  Additional Pertinent Hx: L hip pinning 8/22/20-WBAT LLE, arthritis, cervical spondylosis  Family / Caregiver Present: No      Diagnosis: Pt admitted with gross hematuria, aspiration pneumonitis, MULU. Pt underwent cystoscopy and evacuation of clots on 8/28/20. Pt underwent EGD dilation balloon, submucosal/botox injection on 8/31/20.  Pt admitted from SNF where he was recieving therapy services following a fall resulting in nondisplaced impacted subcapital L femoral neck fx. Pt underwent L hip percutaneous pinning L hip on 8/22/20. Subjective  Subjective: Pt seated in chair upon OT arrival. Pt reports that he slept well  General Comment  Comments: RN Agreeable to OT session. Pain Assessment  Pain Assessment: 0-10  Pain Level: 4  Pain Type: Acute pain  Pain Location: Back  Pain Orientation: Mid;Lower  Pain Descriptors: Discomfort  Pain Frequency: Continuous  Pain Onset: On-going  Non-Pharmaceutical Pain Intervention(s): Distraction;Repositioned  Response to Pain Intervention: Patient Satisfied  Multiple Pain Sites: No  Pre Treatment Pain Screening  Intervention List: Patient able to continue with treatment;Nurse/Physician notified  Vital Signs  Patient Currently in Pain: Yes       Objective    ADL  Grooming: Minimal assistance(Min A to wash face and use mouthwash. Pt sits/stands in Benjamine Sloan. Pt requiring Min A for partial stand to spit into sink during oral care)  LE Dressing: Dependent/Total(don socks)           Balance  Standing Balance: Dependent/Total(Max A x2 using Karenann Karst Steady)  Standing Balance  Time: 3 minutes  Activity: Standing in Benjamine Sloan for grooming at [place] to stand: Dependent/Total(Max A x2 to/from chair x2 reps. Pt with partial stand using RW. Pt able to achieve full stand with Benjamine Sloan)  Stand to sit: Dependent/Total(Max A x2 Karenann Karst Steady to chair)        Cognition  Overall Cognitive Status: Exceptions  Arousal/Alertness: Appropriate responses to stimuli  Following Commands: Follows one step commands with increased time; Follows one step commands with repetition  Attention Span: Attends with cues to redirect  Problem Solving: Assistance required to identify errors made;Assistance required to generate solutions  Initiation: Requires cues for some  Sequencing: Requires cues for some     Plan   Plan  Times per week: 2-5x/week  Current Treatment Recommendations: Strengthening, Patient/Caregiver Education & Training, ROM, Equipment Evaluation, Education, & procurement, Balance Training, Functional Mobility Training, Positioning, Endurance Training, Pain Management, Safety Education & Training, Self-Care / ADL    AM-PAC Score        AM-PAC Inpatient Daily Activity Raw Score: 10 (09/02/20 0952)  AM-PAC Inpatient ADL T-Scale Score : 27.31 (09/02/20 0952)  ADL Inpatient CMS 0-100% Score: 74.7 (09/02/20 0952)  ADL Inpatient CMS G-Code Modifier : CL (09/02/20 7138)    Goals  Short term goals  Time Frame for Short term goals: upon discharge; ongoing 9/2  Short term goal 1: Max A of 1 person functional transfers to/from EOB, chair, toilet/BSC  Short term goal 2: SBA grooming seated  Short term goal 3: Max A of 1 person LB dressing  Short term goal 4: Max A of 1 person toileting. Patient Goals   Patient goals : to sit in the chair       Therapy Time   Individual Concurrent Group Co-treatment   Time In 0917         Time Out 0945         Minutes 28         Timed Code Treatment Minutes: 28 Minutes     If pt is discharged prior to next OT session, this note will serve as the discharge summary.     Lynda Etienne OT

## 2020-09-02 NOTE — PROCEDURES
INSTRUMENTAL SWALLOW REPORT  MODIFIED BARIUM SWALLOW/treatment note    NAME: Atilio Chapin   : 1925  MRN: [id number]       Date of Eval: 2020     Ordering Physician: Dr. Sherron Love  Radiologist: Dr. Eric Rivera     Referring Diagnosis(es): Referring Diagnosis: MULU    Past Medical History:  has a past medical history of Achalasia, Arthritis, 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 symptoms, CAD (coronary artery disease), Cervical spondylosis, DVT (deep venous thrombosis) ([address] [address] [address] Hemorrhoids, Hyperlipidemia, Hypertension, PUD (peptic ulcer disease), Sciatica, and War injury due to shrapnel. Past Surgical History:  has a past surgical history that includes Cataract removal (Bilateral); Coronary angioplasty with stent; Hemorrhoid surgery; Appendectomy; hernia repair; repair retinal tear/hole (Left); hip surgery (Left, 2020); Upper gastrointestinal endoscopy (2018); Cystoscopy (N/A, 2020); Upper gastrointestinal endoscopy (N/A, 2020); and Upper gastrointestinal endoscopy (N/A, 2020). Current Diet Solid Consistency: Dysphagia Soft and Bite-Sized (Dysphagia III)  Current Diet Liquid Consistency: Thin       Type of Study: Initial MBS    Recent Chest Xray: 2020  Impression         Interval clearing of the left lung base since prior study.         No acute chest process.       EGD results 2020  \"Upon entering the esophagus there is a large amount of fluid and food debris. The fluid was suctioned out. The pills and food were gently pushed through the EGJ into the stomach. Duodenum: Normal  Stomach: lipoma in the antrum and mild erythema.  Retroflexion did not show a hiatal hernia. Esophagus: GE junction at 45cms.  No Evidence of Newsome's but mild erythema at the EGJ. 100 units of botox were injected in 4 equal aliquots around the EGJ. There was no bleb suggesting the botox went into the muscularis propria.  The 18mm balloon was then inflated to help diffuse the botox and dilate per bite/sip    Recommendations/Treatment  Requires SLP Intervention: Yes  D/C Recommendations: To be determined     Recommended Exercises:    Therapeutic Interventions: Oral care; Patient/Family education    Education: Images and recommendations were reviewed with pt following this exam.   Patient Education: pt educated to results of MBS  Patient Education Response: Needs reinforcement    Prognosis  Prognosis for safe diet advancement: fair  Barriers to reach goals: age  Duration/Frequency of Treatment  Duration/Frequency of Treatment: 1-3 x    Goals:    1-The patient will tolerate repeat BSE when able. 9/1- goal met-  The pt underwent EGD with botox injections yesterday. Per RN, pt too medication with water without difficulty. On this date, pt alert, sitting up in bed. Pt was analyzed with trials with ice chips, water by straw (pt preference) pudding and cracker. Mastication was slow but functional with all trials. Pt demonstrated no s/s aspiration with any consistency presented. Pt able to initiate swallow without difficulty with laryngeal movement observed. Vocal quality remained clear throughout. No coughing, throat clearing or choking observed with any consistency. Pt reports improved swallowing function following yesterday's procedure. Recommend dysphagia III/soft and bite sized diet with thin liquids.     2-The patient will tolerate least restrictive diet without s/s aspiration. 9/2: pt analyzed with limited amount of thin liquids and trial of soft solid. Pt exhibited intermittent cough, although noted prior to PO as well. Pt demonstrated slow but effective mastication with soft solid. Recommend MBS to fully assess swallow function. Cont goal as appropriate     3-The patient will recall and perform compensatory strategies, with no cues. 9/1- goal met-  The pt was educated to the purpose of the visit and swallowing strategies.  The pt demonstrated comprehension- was sitting upright, taking small bites and sips and taking drinks to help \"wash it down\". con't goal   9/2: pt educated to recommendation for MBS and rationale. Pt stated understanding and agreeable  Cont goal  9/3: replayed portion of MBS for pt and explained anatomy/physiology of swallow, concern for refluxed material and possible entering of airway. Explained rationale for diet recommendations and strategies for improved safety of swallow. Pt stated understanding but also stated, \"I am so confused I don't know if I have question. \" explained again, however pt will require cont education  Cont goal    4- Pt will participate in MBS  9/2: goal met    Oral Preparation / Oral Phase  Oral Phase: WFL    Pharyngeal Phase  Pt presented with thin liquids via tsp/cup and straw, puree and solid texture. With all consistencies, reflux back into pyriform sinus cavity occurred. Pt then swallowed and continued pattern of reflux and swallowing. Significant amount of material in pyriform sinus cavity, which places pt at risk for aspiration from material spilling over into airway. However no penetration or aspiration was identified    Esophageal Phase  Esophageal Screen: Impaired  Upper Esophageal Screen- Major Contributing Deficits  Esophageal Backflow Into The Pharynx: All no penetration or aspiration occurred    Pain   Patient Currently in Pain: No    Therapy Time:   Individual Concurrent Group Co-treatment   Time In 1049         Time Out 1115         Minutes 26            Plan:  Diet recommendation: puree/thin liquids (if no further intervention by GI)  Dc recommendation:  Recommend follow up by GI to determine if further interventions are indicated  Sean Rodriguez M.S./[facility]-SLP #[id number]  Pg.  # [id number]  Needs met prior to leaving radiology, results d/w FABIEN Chapman  This document will serve as a dc summary if pt dc prior to next visit   9/2/2020, 11:31 AM

## 2020-09-02 NOTE — CARE COORDINATION
Case Management Assessment            Discharge Note                    Date / Time of Note: 9/2/2020 1:52 PM                  Discharge Note Completed by: Marli Jimenez    Patient Name: Sabrina Chavez   YOB: 1925  Diagnosis: MULU (acute kidney injury) ([facility] [address] [N17.9]  MULU (acute kidney injury) ([facility] [address] [N17.9]   Date / Time: 8/28/2020  9:52 AM    Current PCP: Odalis Rosales MD  Clinic patient: No    Hospitalization in the last 30 days: Yes    Advance Directives:  Code Status: Limited  [place] DNR form completed and on chart: No    Financial:  Payor: MEDICARE / Plan: MEDICARE PART A AND B / Product Type: *No Product type* /      Pharmacy:    [facility], [address] [place] [address]  Phone: [phone] Fax: [phone]      Assistance purchasing medications?: Potential Assistance Purchasing Medications: No  Assistance provided by Case Management: None at this time    Does patient want to participate in local refill/ meds to beds program?: Yes    Meds To Beds General Rules:  1. Can ONLY be done Monday- Friday between 8:30am-5pm  2. Prescription(s) must be in pharmacy by 3pm to be filled same day  3. Copy of patient's insurance/ prescription drug card and patient face sheet must be sent along with the prescription(s)  4. Cost of Rx cannot be added to hospital bill. If financial assistance is needed, please contact unit  or ;  or  CANNOT provide pharmacy voucher for patients co-pays  5.  Patients can then  the prescription on their way out of the hospital at discharge, or pharmacy can deliver to the bedside if staff is available. (payment due at time of pick-up or delivery - cash, check, or card accepted)     Able to afford home medications/ co-pay costs: Yes    ADLS:  Current PT AM-PAC Score: 6 /24  Current OT AM-PAC Score: 10 /24      DISCHARGE Disposition: [facility] (SNF): [facility] Phone: [phone] Fax: [phone]    LOC at discharge: Skilled  REINA Completed: Yes    Notification completed in HENS/PAS?:  Not needed SNF in last 30 days    IMM Completed:   Yes, Case management has presented and reviewed IMM letter #2 to the patient and/or family/ POA. Patient and/or family/POA verbalized understanding of their medicare rights and appeal process if needed. Patient and/or family/POA has signed, initialed and placed today's date (9/2/20) and time ([phone]) on IMM letter #2 on the the appropriate lines. Patient and/or family/POA, copy of letter offered and they are aware that this original copy of IMM letter #2 is available prior to discharge from the paper chart on the unit. Electronic documentation has been entered into epic for IMM letter #2 and original paper copy has been added to the paper chart at the nurses station. Transportation:  Transportation PLAN for discharge: EMS transportation   Mode of Transport: Ambulance stretcher - BLS  Reason for medical transport: Other: hip fx left  Name of [address]: [address]  Phone: [phone]  Time of Transport: 8pm    Transport form completed: Yes       Additional CM Notes: Pt from [facility] will DC to [facility] no HENS needed, orders faxed to [phone], nurse to call report to [phone]. [facility] at [address] [place] [address] for Transition of Care is related to the following treatment goals of MULU (acute kidney injury) ([address] [N17.9]  MULU (acute kidney injury) ([address] [N17.9]    The Patient and/or patient representative Vickie Holder and his family were provided with a choice of provider and agrees with the discharge plan Yes    Freedom of choice list was provided with basic dialogue that supports the patient's individualized plan of care/goals and shares the quality data associated with the providers.  Yes    Care Transitions patient: Yes    Debbie Schwarz RN  [facility], [facility].  Case Management Department  Ph: [phone]  Fax: [phone]

## 2020-09-03 ENCOUNTER — OUTSIDE SERVICES (OUTPATIENT)
Dept: INTERNAL MEDICINE CLINIC | Age: 85
End: 2020-09-03
Payer: MEDICARE

## 2020-09-03 VITALS
DIASTOLIC BLOOD PRESSURE: 66 MMHG | RESPIRATION RATE: 16 BRPM | BODY MASS INDEX: 22.52 KG/M2 | SYSTOLIC BLOOD PRESSURE: 117 MMHG | OXYGEN SATURATION: 96 % | HEART RATE: 90 BPM | TEMPERATURE: 98.6 F | WEIGHT: 175.4 LBS

## 2020-09-03 PROCEDURE — 99305 1ST NF CARE MODERATE MDM 35: CPT | Performed by: INTERNAL MEDICINE

## 2020-09-03 NOTE — PROGRESS NOTES
Transport p/u pt at 8:47pm. Tele monitor and IV d/c'd. Pt's VSS, alert and oriented. Pt left unit stable.

## 2020-09-03 NOTE — PROGRESS NOTES
[address] [facility]  Internal Medicine  Patient Encounter  Mina Salomon D.O., Shaina hitchcock          Chief Complaint   Patient presents with    Follow-Up from [facility] Benign Prostatic Hypertrophy    Hematuria    Dysphagia       HPI-- 80 y.o. male seen today S/P readmission to [facility]  8/28/2020-9/2/2020. Patient had initially been admitted to [facility] following this hospitalization in August after he fell at home and sustained a left femoral neck fracture. Patient underwent a percutaneous screw fixation procedure. During his initial days at [facility] he was having progressively worsening swallowing difficulty. It turned out he had a known history of achalasia which was not listed in his medical record. Patient had received Botox twice in the past.  On 8/26/2020 during my initial evaluation the patient was actively aspirating. The patient was treated with nebulizers and antibiotics. A chest x-ray was obtained which showed a pulmonary mass that was possibly deviating the trachea. Additionally, during his initial evaluation he was found to have urinary retention. He is having history of BPH with lower urinary tract symptoms. Patient had to be straight cathed. During the next couple of days he continued to have periods of urinary retention requiring intermittent straight cathing. It was decided that the Chambers will be left in place. Initially the Chambers was draining but then urine output ceased. The Chambers tried to be flushed and blood clots were obtained. On 8/27/2020 his lab work showed acute renal failure with a BUN of 58 and creatinine of 1.7 suggesting prerenal likely due to dehydration from his achalasia. He was started on IV fluids. Unfortunately his urination issues continue to worsen and his bladder became significantly distended.   He needed to be sent back to the hospital.  There a CAT scan was obtained showing bilateral hydroureter hydronephrosis along with a probable hemorrhagic mass in the bladder. The patient was taken to the operative theater where he underwent a cystoscopy with  hemorrhagic clot evacuation. It was noted that the bleeding was likely due to Chambers trauma due to the size of his prostate and false passage in Chambers catheter. He required continuous bladder irrigation. For his swallowing difficulties and achalasia GI was consulted. The patient was taken for EGD and repeat Botox injections. Overall, the patient states he feels much better with regards to his swallowing. He still has food stick. He is not spitting up or regurgitating food nearly as much as he was before hospitalization. His Chambers catheter remains in place and is still draining urine mixed with some blood clots and blood. The urologist indicated the Chambers should stay in For total of 1 week and then a voiding trial after 1 to 2 days. This is confusing. Patient states she becomes disoriented and does not know what is going on.      9/2/2020 lab: White blood cell count 15,000  Hemoglobin 9.4  Hematocrit 27.8  Sodium 135  Potassium 3.4  BUN 23  Creatinine 1.1  Albumin 2.5    Imaging: CT abd/pelvis 8/28/20  1. Evidence of bladder outlet obstruction secondary to prostatomegaly with consequential bilateral hydroureter. .    2. Hyperattenuated material within the posterior aspect of the urinary bladder is concerning for hemorrhage/mass. Direct visualization can be obtained in proper clinical settings. 3. High-density focus in the midpole of the left kidney. Differentials include high-protein cyst/neoplasm. Ultrasound of the kidney can be obtained.     4. Partially visualized soft tissue mass in the right posterior lateral chest wall, deep and separate from the right latissimus dorsi muscle.            Medical/Surgical Histories     Past Medical History:   Diagnosis Date    Achalasia     Arthritis     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 [facility]     [facility]    CAD (coronary artery mg by mouth every 6 hours as needed for Nausea or Vomiting   finasteride (PROSCAR) 5 MG tablet Take 5 mg by mouth daily   Heparin Sodium, Porcine, (HEPARIN, PORCINE,) 5000 UNIT/ML injection Inject 1 mL into the skin every 8 hours for 28 days   levothyroxine (SYNTHROID) 50 MCG tablet Take 50 mcg by mouth Daily   clopidogrel (PLAVIX) 75 MG tablet Take 75 mg by mouth daily. aspirin 81 MG tablet Take 81 mg by mouth daily. No Known Allergies      Substance Use History     Social History     Tobacco Use    Smoking status: Never Smoker    Smokeless tobacco: Never Used   Substance Use Topics    Alcohol use: No    Drug use: No          Family History     No family history on file. REVIEW OF SYSTEMS:    CONSTITUTIONAL: + Fatigue, + weight loss. According to the electronic health record he has lost about 10 pounds in the last 1 year. EYES: Neg  Blurry vision, loss of vision, double vision, tearing, itching, eye pain. EARS: + Hearing loss. Has hearing aids but not sure if working. He gets these from the [place]  NOSE:  Neg  Rhinorrhea, sneezing, itching, allergy, epistaxis, or snoring  MOUTH/THROAT:  Neg Bleeding gums, hoarseness, sore throat, throat infections, or dentures. + Dysphagia  RESPIRATORY: + Shortness of breath, + cough, + wheezing. CARDIOVASCULAR:  Neg Chest pain, palpitations, heart murmur, dyspnea on exertion, orthopnea, paroxysmal nocturnal dyspnea or edema of extremities, or claudication  GASTROINTESTINAL:  Neg   Nausea, vomiting, or diarrhea, constipation hematemesis, heart burn,change in bowel movements or stool caliber, hematochezia, melena, abdominal pain.  + Regurgitation, achalasia, EGDs with Botox  GENITOURINARY: + Difficulty urinating, not able to get urine out, history of BPH.   Add urinary retention in the hospital  HEMATOLOGIC/LYMPHATIC:  Neg  Anemia, bleeding dyscrasias, easy bruising, blood clots (DVT/PE), transfusions, or enlarged lymph nodes  MUSCULOSKELETAL:  Neg  New myalgias, bone pain, joint pain, joint swelling, low back pain, neck pain, radicular pain.  + Left hip fracture  NEUROLOGICAL:  Neg  Loss of Consciousness, memeory loss or forgetfulness, confusion, difficulty concentrating, seizures, insomina, aphasia or dysarthria, unilateral weakness or paresthesias, ataxia, headaches, syncope, tremor, or H/O head trauma. PSYCHIATRIC: + Depression. SKIN :  Neg  Rash  ENDOCRINE:  Neg  Polydipsia,polyuria, heat /cold intolerance, Hair changes. No H/O Diabetes or Thyroid disease. Physical Exam  Vitals:    09/03/20 1738   BP: 117/66   Pulse: 90   Resp: 16   Temp: 98.6 °F (37 °C)   SpO2: 96%   Weight: 175 lb 6.4 oz (79.6 kg)     Body mass index is 22.52 kg/m². Wt Readings from Last 3 Encounters:   09/02/20 191 lb 12.8 oz (87 kg)   08/26/20 179 lb (81.2 kg)   08/22/20 186 lb 11.2 oz (84.7 kg)     BP Readings from Last 3 Encounters:   09/02/20 132/73   08/31/20 103/67   08/28/20 (!) 170/101      GEN:  80 y.o. male who is in NAD. Very [place]. HEAD:  NC/AT, no lesions. EYES:  SARAH, EOMI, No scleral icterus or conjunctival injection or discharge. Visual fields in tact to confrontation. Fundoscopic (non-dilated) grossly normal.  Disc margins well demarcated. EARS:  EAC's clear, TM's normal.  Extremely hard of hearing  NOSE:  Nasal cavity is clear. No mucosal congestion or discharge. Sinuses are nontender. MOUTH & THROAT:  Oral cavity is clear without mucosal lesions. Tongue is midline. Dentition is in fair repair. NECK:  Supple. Full ROM. Trachea is midline. No increased JVD. No thyromegaly or nodules. No masses tachycardic. No murmurs. LYMPH: No C/SC/A/F nodes  CARDIAC:  S1S2 NL. Regular rhythm. Tachycardic. No murmurs. No ectopy  VASC:  Pedal pulses 2/4. Carotid upstrokes 2+. No bruits noted. PULM: CTA. No crackles. Diminished BS throughout. GI:  Abdomen is soft.   We will straight cath,---->  750 cc of Coca-Cola colored urine  EXT:  No Cyanosis or clubbing.  + Bilateral distal lower extremity edema, left> right  SKIN: Warm and dry, no rashes. NEURO:  Cranial nerves 2-12 are NL. Speech fluent and coherent. Strength is 5/5 in all muscle groups. No sensory deficits. No focal or lateralizing deficits. Reflexes 2/4 and symmetric. Gait not assessed  MS:  Range of motion not assessed  PSYCH:  Mood and affect NL. Judgement and insight NL. Encounter Diagnoses   Name Primary?  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 symptoms Yes    Urinary retention     CKD (chronic kidney disease) stage 3, GFR 30-59 ml/min ([facility])     Achalasia     Oropharyngeal dysphagia     Chambers catheter in place     Sensorineural hearing loss (SNHL) of both ears     Kidney lesion, native, left     Chronic diastolic CHF (congestive heart failure) ([facility])     Coronary artery disease involving native coronary artery of native heart without angina pectoris     Closed fracture of left hip with routine healing, subsequent encounter        PLAN:  #1 LAB on Monday  #2 Push Fluids  #3  Confirm with neurology whether or not they would like to get a voiding trial in their office given the size of his prostate.   He will eventually require a voiding trial.  #4  Patient is to follow-up with GI in 3 months  #5 continue PT/OT/ST  #6 Nutritional supplement  #7 May need CT chest  #8 May need MRI kidney  #9 Continue Heparin for DVT prophylaxis

## 2020-09-04 ENCOUNTER — TELEPHONE (OUTPATIENT)
Dept: INTERNAL MEDICINE CLINIC | Age: 85
End: 2020-09-04

## 2020-09-04 NOTE — TELEPHONE ENCOUNTER
Patient's son Ladarius Calvin) called for Dr. Tarah Sierra because he states he spoke to Dr. Tarah Sierra last night about his father (patient) but since then he has received some \"strange\" information he would like to impart to Dr. Tarah Sierra. Please call at number provided. Thanks.

## 2020-09-16 ENCOUNTER — OUTSIDE SERVICES (OUTPATIENT)
Dept: INTERNAL MEDICINE CLINIC | Age: 85
End: 2020-09-16
Payer: MEDICARE

## 2020-09-16 VITALS
TEMPERATURE: 98.2 F | WEIGHT: 172 LBS | OXYGEN SATURATION: 97 % | HEART RATE: 60 BPM | SYSTOLIC BLOOD PRESSURE: 129 MMHG | DIASTOLIC BLOOD PRESSURE: 63 MMHG | BODY MASS INDEX: 22.08 KG/M2

## 2020-09-16 PROCEDURE — 99308 SBSQ NF CARE LOW MDM 20: CPT | Performed by: INTERNAL MEDICINE

## 2020-09-16 ASSESSMENT — ENCOUNTER SYMPTOMS
CHEST TIGHTNESS: 0
CHOKING: 0
CONSTIPATION: 0
DIARRHEA: 0
STRIDOR: 0
SHORTNESS OF BREATH: 0
TROUBLE SWALLOWING: 1
COUGH: 0
WHEEZING: 0
VOMITING: 0
NAUSEA: 0

## 2020-09-16 NOTE — PROGRESS NOTES
2020    [facility]) Physicians  Internal Medicine  Patient Encounter  Price Andre D.O., [address] -- Audio/Visual (During ZHBGQ-20 public health emergency)      I discussed at this telephone/video visit is a nontraditional type of visit that we are conducting in lieu of an office visit to minimize patient risk during the coronavirus pandemic. I discussed with the patient that I would not be able to perform a full physical examination, but that in most other respects the visit would be beneficial to his/her continued medical care. He/She again gave verbal consent for us to conduct this type of visit. Chief Complaint   Patient presents with    Follow-up         HPI:    Nadine Fernandes (:  1925) has requested an audio/video evaluation for the following concern(s): Short interval follow-up. 80 y.o. male being evaluated via virtual video visit due to the coronavirus pandemic emergency and public health crisis and inability to see the patient face-to-face. Patient is being evaluated for a short interval follow-up following 2 hospitalizations. Patient was hospitalized in August following a fall at home. He sustained a closed left hip fracture which was repaired via a closed percutaneous reduction/pinning. This was followed by an aspiration pneumonitis event followed by urinary retention which eventually required another hospitalization in September. [facility] with an indwelling Chambers catheter. He has since been reevaluated at the urology office and was not able to have the Chambers removed due to ongoing urinary retention. Since his return he has had intermittent hematuria. He is being evaluated    Current problem list:  BPH with lower urinary tract obstruction  Indwelling Chambers catheter--due to follow-up with urology. Still with intermittent hematuria. Patient saw urology in follow-up on 2020 and just yesterday on 9/15/2020.   The Chambers catheter had to be replaced. There is an order for Chambers removal on 9/22/2020 and a follow-up with urology on 9/29/2020. Closed left hip fracture--Pain well controlled. Ambulating with CGA. Achalasia--status post Botox. Patient still has occasional esophageal dysphagia but he is swallowing much better. No aspiration. Protein calorie malnutrition--eating better. Chronic kidney disease--no foamy or frothy urine. Due for lab. Last lab on 9/14/2020 stable    Coronary artery disease--patient denies any anginal chest pain or shortness of breath. He denies any palpitations. Chronic diastolic congestive heart failure--patient denies any orthopnea or increased swelling. Carotid stenosis--it appears his last carotid Doppler was in 2016. This has not been rechecked since then. No TIA or stroke related symptoms. Past Medical History:   Diagnosis Date    Achalasia     Arthritis     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     [facility]    CAD (coronary artery disease)     stents x 3 - dr han   [facility] Cervical spondylosis     DVT (deep venous thrombosis) ([facility] [address] 2003    right leg    Hemorrhoids     Hyperlipidemia     Hypertension     PUD (peptic ulcer disease)     Sciatica     War injury due to shrapnel     leg       Prior to Visit Medications    Medication Sig Taking?  Authorizing Provider   atorvastatin (LIPITOR) 80 MG tablet Take 80 mg by mouth nightly  Historical Provider, MD   tamsulosin (FLOMAX) 0.4 MG capsule Take 0.4 mg by mouth nightly  Historical Provider, MD   lisinopril (PRINIVIL;ZESTRIL) 20 MG tablet Take 20 mg by mouth daily  Historical Provider, MD   pantoprazole (PROTONIX) 40 MG tablet Take 40 mg by mouth daily  Historical Provider, MD   torsemide (DEMADEX) 10 MG tablet Take 10 mg by mouth daily  Historical Provider, MD   ipratropium-albuterol (DUONEB) 0.5-2.5 (3) MG/3ML SOLN nebulizer solution Inhale 1 vial into the lungs every 6 hours  Historical Provider, MD   ipratropium-albuterol (DUONEB) 0.5-2.5 (3) MG/3ML SOLN nebulizer solution Inhale 1 vial into the lungs every 6 hours as needed for Shortness of Breath  Historical Provider, MD   traMADol (ULTRAM) 50 MG tablet Take 50 mg by mouth every 6 hours as needed for Pain. Historical Provider, MD   ondansetron (ZOFRAN) 4 MG tablet Take 4 mg by mouth every 6 hours as needed for Nausea or Vomiting  Historical Provider, MD   finasteride (PROSCAR) 5 MG tablet Take 5 mg by mouth daily  Historical Provider, MD   Heparin Sodium, Porcine, (HEPARIN, PORCINE,) 5000 UNIT/ML injection Inject 1 mL into the skin every 8 hours for 28 days  Tianna Franz MD   levothyroxine (SYNTHROID) 50 MCG tablet Take 50 mcg by mouth Daily  Historical Provider, MD   clopidogrel (PLAVIX) 75 MG tablet Take 75 mg by mouth daily. Historical Provider, MD   aspirin 81 MG tablet Take 81 mg by mouth daily. Historical Provider, MD         Review of Systems   Constitutional: Positive for fatigue. Negative for appetite change. HENT: Positive for hearing loss and trouble swallowing. Negative for congestion and drooling. Esophageal dysphagia improved since Botox injection   Eyes: Negative for visual disturbance. Respiratory: Negative for cough, choking, chest tightness, shortness of breath, wheezing and stridor. No choking episodes   Cardiovascular: Negative for chest pain, palpitations and leg swelling. Gastrointestinal: Negative for constipation, diarrhea, nausea and vomiting. Good BMs   Genitourinary: Positive for difficulty urinating. Chambers catheter present   Musculoskeletal:        Left hip pain improved   Skin: Negative for rash. Neurological: Positive for weakness. Negative for dizziness and light-headedness.            PHYSICAL EXAMINATION:    Vital Signs: (As obtained by patient/caregiver or practitioner observation)    Vitals:    09/16/20 1015   BP: 129/63   Pulse: 60   Temp: 98.2 °F (36.8 °C)   SpO2: 97% Weight: 172 lb (78 kg)     Body mass index is 22.08 kg/m². Wt Readings from Last 3 Encounters:   09/16/20 172 lb (78 kg)   09/02/20 191 lb 12.8 oz (87 kg)   08/26/20 179 lb (81.2 kg)     BP Readings from Last 3 Encounters:   09/16/20 129/63   09/02/20 132/73   08/31/20 103/67           Physical Exam  Constitutional:       General: He is not in acute distress. Appearance: He is not ill-appearing. Comments: Thin   HENT:      Head: Normocephalic and atraumatic. Comments: Hard of Hearing     Mouth/Throat:      Mouth: Mucous membranes are moist.   Eyes:      Extraocular Movements: Extraocular movements intact. Conjunctiva/sclera: Conjunctivae normal.   Neck:      Musculoskeletal: Normal range of motion. Pulmonary:      Effort: Pulmonary effort is normal. No respiratory distress. Abdominal:      General: There is no distension. Musculoskeletal:      Comments: Standing with gait belt, CGA, ambulating with walker   Skin:     Findings: No rash. Neurological:      Mental Status: He is alert and oriented to person, place, and time. Psychiatric:         Mood and Affect: Mood normal.         Behavior: Behavior normal.         Thought Content: Thought content normal.         Judgment: Judgment normal.           Other pertinent observable physical exam findings-     Due to this being a TeleHealth encounter, evaluation of the following organ systems is limited: Vitals/Constitutional/EENT/Resp/CV/GI//MS/Neuro/Skin/Heme-Lymph-Imm. Encounter Diagnoses   Name Primary?     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 symptoms Yes    Achalasia     Bilateral carotid artery disease, unspecified type ([facility])     CKD (chronic kidney disease) stage 3, GFR 30-59 ml/min ([facility])     Sensorineural hearing loss (SNHL) of both ears     Chronic diastolic CHF (congestive heart failure) ([facility])     Coronary artery disease involving native coronary artery of native heart without angina pectoris     Closed fracture

## 2020-09-27 ENCOUNTER — OUTSIDE SERVICES (OUTPATIENT)
Dept: INTERNAL MEDICINE CLINIC | Age: 85
End: 2020-09-27
Payer: MEDICARE

## 2020-09-27 VITALS
HEART RATE: 61 BPM | RESPIRATION RATE: 16 BRPM | DIASTOLIC BLOOD PRESSURE: 58 MMHG | OXYGEN SATURATION: 97 % | BODY MASS INDEX: 23.5 KG/M2 | TEMPERATURE: 98.8 F | WEIGHT: 183 LBS | SYSTOLIC BLOOD PRESSURE: 133 MMHG

## 2020-09-27 PROCEDURE — 99308 SBSQ NF CARE LOW MDM 20: CPT | Performed by: INTERNAL MEDICINE

## 2020-09-27 RX ORDER — SENNA AND DOCUSATE SODIUM 50; 8.6 MG/1; MG/1
2 TABLET, FILM COATED ORAL DAILY
COMMUNITY
End: 2022-01-05 | Stop reason: ALTCHOICE

## 2020-09-27 RX ORDER — UBIDECARENONE 75 MG
50 CAPSULE ORAL DAILY
COMMUNITY
End: 2020-10-10

## 2020-09-27 NOTE — PROGRESS NOTES
[facility] [place] [place]) Physicians  Internal Medicine  Patient Encounter  [address] [facility], D.O., [facility]        Chief Complaint   Patient presents with    Follow-up       HPI: 80 y.o. male seen today for a follow-up regarding the status of current issues listed below along with a medication review and reconciliation. Patient has been clinically stable, but he is only progressing very slowly in therapy. Patient states he is only able to take about 10 steps with his walker. He does not feel he is ready to go home. Prior to his fall and hip fracture he was able to be up and moving independently. BPH with lower urinary tract obstruction  Indwelling Chambers catheter--he was recently seen by the neurologist.  His Chambers catheter had been removed prior to the appointment. Unfortunately he continues to have evidence of urinary retention. The catheter was replaced. There is an order now to remove the Chambers catheter on October 15 and replaced the catheter should he not be able to void after 6 hours. He denies any bladder spasms or pain. He had no hematuria. Closed left hip fracture--Patient is ambulating with a walker. He denies any pain. Patient is still receiving subcu heparin. Has been getting this since his surgery. The order was to be on the heparin for 28 days upon discharge. He has completed therapy. Achalasia--status post Botox. Patient states he is swallowing much better. He denies any food sticking when swallowing. He denies regurgitation. Protein calorie malnutrition--eating better. Patient's weight is up. Chronic kidney disease--lab from 9/24/2020 showed improved renal function with a BUN of 16 and creatinine of 1.0 and a GFR of 69. Coronary artery disease--Patient denies any chest pain or shortness of breath. He denies any lightheadedness or syncope. Denies any palpitations. He does have some lower extremity swelling.     Chronic diastolic congestive heart failure--Patient denies any increased shortness of breath or worsening swelling. Past Medical History:   Diagnosis Date    Achalasia     Arthritis     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     [facility]    CAD (coronary artery disease)     stents x 3 - dr Karthikeyan Anne Cervical spondylosis     DVT (deep venous thrombosis) ([facility] [address] 2003    right leg    Hemorrhoids     Hyperlipidemia     Hypertension     PUD (peptic ulcer disease)     Sciatica     War injury due to shrapnel     leg         MEDICATIONS:  Medication Sig   atorvastatin (LIPITOR) 80 MG tablet Take 80 mg by mouth nightly   tamsulosin (FLOMAX) 0.4 MG capsule Take 0.4 mg by mouth nightly   lisinopril (PRINIVIL;ZESTRIL) 20 MG tablet Take 20 mg by mouth daily   pantoprazole (PROTONIX) 40 MG tablet Take 40 mg by mouth daily   torsemide (DEMADEX) 10 MG tablet Take 10 mg by mouth daily   ipratropium-albuterol (DUONEB) 0.5-2.5 (3) MG/3ML SOLN nebulizer solution Inhale 1 vial into the lungs every 6 hours as needed for Shortness of Breath   traMADol (ULTRAM) 50 MG tablet Take 50 mg by mouth every 6 hours as needed for Pain. ondansetron (ZOFRAN) 4 MG tablet Take 4 mg by mouth every 6 hours as needed for Nausea or Vomiting   finasteride (PROSCAR) 5 MG tablet Take 5 mg by mouth daily   levothyroxine (SYNTHROID) 50 MCG tablet Take 50 mcg by mouth Daily   clopidogrel (PLAVIX) 75 MG tablet Take 75 mg by mouth daily. aspirin 81 MG tablet Take 81 mg by mouth daily. Heparin 5000 TID          Review of Systems - As per HPI      OBJECTIVE:  Vitals:    09/27/20 1517   BP: (!) 133/58   Pulse: 61   Resp: 16   Temp: 98.8 °F (37.1 °C)   SpO2: 97%   Weight: 183 lb (83 kg)     Body mass index is 23.5 kg/m². Wt Readings from Last 3 Encounters:   09/27/20 183 lb (83 kg)   09/16/20 172 lb (78 kg)   09/02/20 191 lb 12.8 oz (87 kg)     BP Readings from Last 3 Encounters:   09/27/20 (!) 133/58   09/16/20 129/63   09/02/20 132/73         GEN: NAD, A&O, Non-toxic.   JOAN ALEJANDRA: NC/AT, SARATH, EOMI, Oral cavity Clear,  TM's NL, Nasal cavity clear. NECK: Supple. No thyromegaly. No JVD  LYMPH: No C/SC nodes. CV: S1 S2 NL, RRR. No murmurs, clicks or rubs. PULM: CTA  EXT: + BL LE edema, no worse  GI: Abdomen is soft, NT, BS+, No hepatomegaly. No masses. :  Chambers draining clear, light yellow urine  NEURO: No focal or lateralizing deficits. VASC:  No carotid bruits. Pulses symmetric  SKIN:  No rashes or lesions of concern    ASSESSMENT[de-identified]  Barbara Govea was seen today for follow-up. Diagnoses and all orders for this visit:    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 symptoms    Achalasia    CKD (chronic kidney disease) stage 2, GFR 60-89 ml/min    Chronic diastolic CHF (congestive heart failure) (HCC)    Closed fracture of left hip with routine healing, subsequent encounter    Chambers catheter in place        Additional Plan:  1. Continue PT/OT  2. Pt not safe for discharge at this time. Needs more strengthening, balance, gait training  3. Chambers out 10/15  4. D/C Heparin        Discussed medications with patient who voiced understanding of their use, indication and potential side effects. Pt also understands the above recommendations. All questions answered. This note was generated completely or in part utilizing Dragon dictation speech recognition software. Occasionally, words are mistranscribed and despite editing, the text may contain inaccuracies due to incorrect word recognition.   If further clarification is needed please contact the office at [phone]       Electronically signed    Shiloh Aranda D.O.

## 2020-10-10 ENCOUNTER — OUTSIDE SERVICES (OUTPATIENT)
Dept: INTERNAL MEDICINE CLINIC | Age: 85
End: 2020-10-10
Payer: MEDICARE

## 2020-10-10 VITALS
DIASTOLIC BLOOD PRESSURE: 64 MMHG | RESPIRATION RATE: 12 BRPM | HEART RATE: 74 BPM | TEMPERATURE: 98.4 F | BODY MASS INDEX: 24.14 KG/M2 | SYSTOLIC BLOOD PRESSURE: 158 MMHG | OXYGEN SATURATION: 98 % | WEIGHT: 188 LBS

## 2020-10-10 PROCEDURE — 99309 SBSQ NF CARE MODERATE MDM 30: CPT | Performed by: INTERNAL MEDICINE

## 2020-10-10 RX ORDER — ACETAMINOPHEN 325 MG/1
650 TABLET ORAL EVERY 6 HOURS PRN
COMMUNITY

## 2020-10-10 RX ORDER — B-COMPLEX WITH VITAMIN C
1 TABLET ORAL DAILY
COMMUNITY

## 2020-10-10 NOTE — PROGRESS NOTES
[facility]) Physicians  Internal Medicine  Patient Encounter  Elio Hernandez D.O., [facility]        Chief Complaint   Patient presents with    Follow-up    Medication Check    Edema       HPI: 80 y.o. male seen today for a follow-up regarding the status of current issues listed below along with a medication review and reconciliation. Pt has been progressing in therapy, but slowly. He continues require assistance standing from a seated position--chair or toilet. Once he is up, he is able to ambulate with a walker. Aides use a gait belt because his knees can buckle. He has been incontinent with stool frequently. He requires assistance with perineal hygiene. Son intend to take him home. BPH with lower urinary tract obstruction  Indwelling Gonzales catheter--Seen by Urology 9/24/2020. There is an order now to remove the Gonzales catheter on October 15 and replaced the catheter should he not be able to void after 6 hours. Concern is getting the catheter in here. He has a large prostate and catheter placement in August left to false passage and gonzales trauma with hemorrhage and clot in bladder. The latter led to more obstruction and he had to undergo cysto with clot evacuation. Closed left hip fracture--Fell 8/20/2020. Sees Dr. Johnny Montalvo who performed percutaneous screw fixation. Patient is ambulating with a walker. He reports occasional soreness. He completed Heparin SC. Achalasia--status post Botox while hospitalized 8/28/2020-9/2/2020 by Dr. Mildred Harp on 8/31/2020. Pt reports occasional food sticking if food is too large. He denies regurgitation. He was to follow up with Dr. Mildred Harp in 3 months. Protein calorie malnutrition--Patient's weight is up. He is eating better. Chronic kidney disease--lab from 9/24/2020 showed improved renal function with a BUN of 16 and creatinine of 1.0 and a GFR of 69. Will need repeat lab. Gonzales in place.       Coronary artery disease--Patient denies any chest pain or shortness of breath. He denies any lightheadedness or syncope. He sees Dr. Janny Nelson. Chronic diastolic congestive heart failure--Pt has had increased swelling. He denies calf pain. He sits most of the day. He wears compression hose. He denies SOB at rest.  He denies orthopnea. He does have CASTRO after walking in the park. Past Medical History:   Diagnosis Date    Achalasia     Arthritis     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     [facility]    CAD (coronary artery disease)     stents x 3 - dr Ifrah Clifton Cervical spondylosis     DVT (deep venous thrombosis) ([facility] [address] 2003    right leg    Hemorrhoids     Hyperlipidemia     Hypertension     PUD (peptic ulcer disease)     Sciatica     War injury due to shrapnel     leg         Medication Sig   vitamin D (CHOLECALCIFEROL) 25 MCG (1000 UT) TABS tablet Take 1,000 Units by mouth daily   sennosides-docusate sodium (SENOKOT-S) 8.6-50 MG tablet Take 2 tablets by mouth daily   atorvastatin (LIPITOR) 80 MG tablet Take 80 mg by mouth nightly   tamsulosin (FLOMAX) 0.4 MG capsule Take 0.4 mg by mouth nightly   lisinopril (PRINIVIL;ZESTRIL) 20 MG tablet Take 20 mg by mouth daily   pantoprazole (PROTONIX) 40 MG tablet Take 40 mg by mouth daily   torsemide (DEMADEX) 10 MG tablet Take 10 mg by mouth daily   ipratropium-albuterol (DUONEB) 0.5-2.5 (3) MG/3ML SOLN nebulizer solution Inhale 1 vial into the lungs every 6 hours as needed for Shortness of Breath   traMADol (ULTRAM) 50 MG tablet Take 50 mg by mouth every 6 hours as needed for Pain. ondansetron (ZOFRAN) 4 MG tablet Take 4 mg by mouth every 6 hours as needed for Nausea or Vomiting   finasteride (PROSCAR) 5 MG tablet Take 5 mg by mouth daily   levothyroxine (SYNTHROID) 50 MCG tablet Take 50 mcg by mouth Daily   clopidogrel (PLAVIX) 75 MG tablet Take 75 mg by mouth daily. aspirin 81 MG tablet Take 81 mg by mouth daily.             Review of Systems - As per HPI      OBJECTIVE:  Vitals:    10/10/20 1042   BP: (!) 158/64   Pulse: 74   Resp: 12   Temp: 98.4 °F (36.9 °C)   SpO2: 98%   Weight: 188 lb (85.3 kg)     Body mass index is 24.14 kg/m². Wt Readings from Last 3 Encounters:   10/10/20 188 lb (85.3 kg)   09/27/20 183 lb (83 kg)   09/16/20 172 lb (78 kg)     BP Readings from Last 3 Encounters:   10/10/20 (!) 158/64   09/27/20 (!) 133/58   09/16/20 129/63         GEN: NAD, A&O, Non-toxic. [place]  HEENT: NC/AT, SARATH, EOMI, Oral cavity clear,  TM's NL, Throat is NL. NECK: Supple. No thyromegaly. No JVD  LYMPH: No C/SC nodes. CV: Reg rhythm, sounds distant, No ectopy. Soft murmur. PULM: CTA  EXT: 2-3+ BL LE pitting edema. GI: Abdomen is soft, NT, BS+, No hepatomegaly. No masses. :  Chambers draining clear, light yellow urine. NEURO: No focal or lateralizing deficits. Moves all extremities. VASC:  No carotid bruits. Pedal pulses symmetric  SKIN:  No rashes or lesions of concern        ASSESSMENT/PLAN:    1. 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 symptoms  Condition is stable with Chambers catheter in place  Patient has orders to remove catheter on 10/15/2020 and then a follow-up with urology    2. Chambers catheter in place      3. Achalasia  Condition is improved since his EGD with Botox procedure on 8/31/2020. Patient needs a follow-up with GI at the end of November  Continue to monitor for progressively worsening esophageal dysphagia and regurgitation. 4. Chronic diastolic CHF (congestive heart failure) (HCC)  Condition seems stable though he does have some increased edema. Patient has not had any increasing shortness of breath or orthopnea. He has not had any increased cough  Continue torsemide. Will increase to 20 mg daily to help with swelling  Monitor renal function  Patient needs to follow-up with his cardiologist, Dr. Deonte Khoury. This is scheduled for 11/4/2020    5.  Closed fracture of left hip with routine healing, subsequent encounter  Patient continues to have debility. He continues to require assistance standing from a seated position. He also requires some assistance with lower extremity dressing. Patient will need to follow-up with Ortho    6. Stage 3a chronic kidney disease  His last lab on 9/24/2020 actually showed improvement in his BUN and creatinine  Recheck lab    7. Coronary artery disease involving native coronary artery of native heart without angina pectoris  Condition is stable without signs of angina or anginal equivalents  He does have a follow-up appointment with his cardiologist on 11/4/2020    8. Kidney lesion, native, left  This was found incidentally on imaging. Patient will need further evaluation if patient and family choose to do so. 9. Bilateral leg edema  Swelling is a bit worse though he sits most of the day. Edema appears dependent  Continue compression stockings  Repeat lower extremity venous Doppler    10. Venous insufficiency of both lower extremities  As above    11. Full incontinence of feces  Patient states he is not always aware when he has to have a bowel movement. Continue to monitor. Patient may need further evaluation. Left message for son to call    Patient will be recommended for discharge home as long as he has 24/7 care. Patient does have a lift chair at home which will be helpful. He will need help with toileting. Home health care should also be arranged so he receives PT and OT continuation as well as a visiting RN. Discussed medications with patient who voiced understanding of their use, indication and potential side effects. Pt also understands the above recommendations. All questions answered. This note was generated completely or in part utilizing Dragon dictation speech recognition software. Occasionally, words are mistranscribed and despite editing, the text may contain inaccuracies due to incorrect word recognition.   If further clarification is needed please contact the office at [phone]       Electronically signed    Natalya Brewster D.O.

## 2020-10-12 ENCOUNTER — TELEPHONE (OUTPATIENT)
Dept: INTERNAL MEDICINE CLINIC | Age: 85
End: 2020-10-12

## 2020-10-12 NOTE — TELEPHONE ENCOUNTER
Patient's son states he is returning Dr. Wetzel Clamp call from Saturday. Please contact Patient's son st the number provided.

## 2020-10-15 ENCOUNTER — TELEPHONE (OUTPATIENT)
Dept: INTERNAL MEDICINE CLINIC | Age: 85
End: 2020-10-15

## 2020-10-15 NOTE — TELEPHONE ENCOUNTER
OK for the refills to be sent to the pharmacy as requested BUT change the Demadex to 20mg    #60  2qd   R5    Let me know if the edema and dyspnea does not improve some.

## 2020-10-15 NOTE — TELEPHONE ENCOUNTER
Manpreet Otto with [address] called to advise she had her first visit with the patient today and it looks like the patient has a new diagnosis on his chart for CHF that came from the patients skilled nursing facility [facility]. Manpreet Fam states the patient has extensive lower extremity edema, rales in patients right lower lobe of lungs. Dyspena with minimal exertion. O2 saturation 95%. Heart rate 83 but it is regularly irregular and at times the patients heart rate is in the 40-50 range but a minute later it will be 80-90 range. BP was 112/50. Patient wants to be a DNR with comfort care only and form is being faxed over to sign. Patient needs a prescription for levothyroxine (SYNTHROID) 50 MCG tablet, patient needs a new prescription for potassium chloride (KLOR-CON M) extended release tablet 10 mEq, diclofenac sodium (VOLTAREN) 1 % GEL and torsemide (DEMADEX) 20 MG tablets. Patient has two stage two pressure ulcers on right buttock and coccyx and DuoDerm is being used for these.

## 2020-10-16 RX ORDER — TORSEMIDE 20 MG/1
20 TABLET ORAL DAILY
Qty: 60 TABLET | Refills: 5 | Status: SHIPPED | OUTPATIENT
Start: 2020-10-16 | End: 2021-07-08 | Stop reason: SDUPTHER

## 2020-10-16 RX ORDER — LEVOTHYROXINE SODIUM 0.05 MG/1
50 TABLET ORAL DAILY
Qty: 30 TABLET | Refills: 5 | Status: SHIPPED | OUTPATIENT
Start: 2020-10-16

## 2020-10-16 RX ORDER — POTASSIUM CHLORIDE 750 MG/1
10 TABLET, FILM COATED, EXTENDED RELEASE ORAL DAILY
Qty: 30 TABLET | Refills: 5 | Status: SHIPPED | OUTPATIENT
Start: 2020-10-16 | End: 2021-05-04

## 2020-10-19 ENCOUNTER — TELEPHONE (OUTPATIENT)
Dept: INTERNAL MEDICINE CLINIC | Age: 85
End: 2020-10-19

## 2020-10-19 NOTE — TELEPHONE ENCOUNTER
Verbal ok to continue PT   Adelaida Mera [phone] [facility]. Just an ok to continue home care.

## 2020-11-03 ENCOUNTER — TELEPHONE (OUTPATIENT)
Dept: INTERNAL MEDICINE CLINIC | Age: 85
End: 2020-11-03

## 2020-11-03 NOTE — TELEPHONE ENCOUNTER
Deb Martinez with [address] called to request verbal approval to continue OT in the home 2x a week for the next 4 weeks.  Please advise

## 2020-11-19 ENCOUNTER — TELEPHONE (OUTPATIENT)
Dept: INTERNAL MEDICINE CLINIC | Age: 85
End: 2020-11-19

## 2020-11-19 NOTE — TELEPHONE ENCOUNTER
Ny Hutchins with [facility] called to advise the DNR needs Dr. Kelley Do signature on it and faxed back to [phone]. Ny Hutchins with [facility] called to advise the patient is unable to go without a gonzales catheter. Patient is seeing Dr. Ngoc Lee who is recommending a TURP but it has not been scheduled yet and a family conference is being called. The family is concerned with the patient getting Covid-19. Patients weight is down 5 lbs from last week and the patients swelling is down as well. Patient was 179 lbs last weak and is now 174 lbs. Patient is eating well.

## 2020-11-24 ENCOUNTER — TELEPHONE (OUTPATIENT)
Dept: INTERNAL MEDICINE CLINIC | Age: 85
End: 2020-11-24

## 2020-11-24 NOTE — TELEPHONE ENCOUNTER
Minnie Choudhary from [address] calling again in regards to the DNR form, it is already scanned into media 10/20/2020, please print it off and have Dr. Melina Velazquez sign and refax to [phone]. She has been waiting on this form for weeks. Please Advise.

## 2021-01-05 ENCOUNTER — TELEPHONE (OUTPATIENT)
Dept: INTERNAL MEDICINE CLINIC | Age: 86
End: 2021-01-05

## 2021-01-05 NOTE — TELEPHONE ENCOUNTER
Tiffany Hui with [facility] called to let Dr. Curt Kenney know that due to covid, they are only able to visit the patient once a month for catheter changes. Today when Tiffany Hui went to visit patient, she seen a blood blister on the left right toe. the patients son said he hit on his walker. Tiffany Hui is marking it as a trauma. Tiffany Hui put skin prep on it daily and also gave them some education as well.        Phone Number [phone]    Please advise

## 2021-01-17 ENCOUNTER — HOSPITAL ENCOUNTER (EMERGENCY)
Age: 86
Discharge: HOME | End: 2021-01-17
Attending: STUDENT IN AN ORGANIZED HEALTH CARE EDUCATION/TRAINING PROGRAM
Payer: MEDICARE

## 2021-01-17 VITALS
SYSTOLIC BLOOD PRESSURE: 136 MMHG | OXYGEN SATURATION: 100 % | RESPIRATION RATE: 18 BRPM | HEART RATE: 84 BPM | DIASTOLIC BLOOD PRESSURE: 64 MMHG | TEMPERATURE: 98.3 F

## 2021-01-17 DIAGNOSIS — T83.9XXA FOLEY CATHETER PROBLEM, INITIAL ENCOUNTER (HCC): ICD-10-CM

## 2021-01-17 DIAGNOSIS — N30.00 ACUTE CYSTITIS WITHOUT HEMATURIA: Primary | ICD-10-CM

## 2021-01-17 LAB
BACTERIA: ABNORMAL /HPF
BILIRUBIN URINE: NEGATIVE
BLOOD, URINE: ABNORMAL
CLARITY: CLEAR
COLOR: YELLOW
EPITHELIAL CELLS, UA: ABNORMAL /HPF (ref 0–5)
GLUCOSE URINE: NEGATIVE MG/DL
KETONES, URINE: NEGATIVE MG/DL
LEUKOCYTE ESTERASE, URINE: ABNORMAL
MICROSCOPIC EXAMINATION: YES
NITRITE, URINE: NEGATIVE
PH UA: 6 (ref 5–8)
PROTEIN UA: NEGATIVE MG/DL
RBC UA: ABNORMAL /HPF (ref 0–4)
SPECIFIC GRAVITY UA: 1.01 (ref 1–1.03)
URINE TYPE: ABNORMAL
UROBILINOGEN, URINE: 0.2 E.U./DL
WBC UA: >100 /HPF (ref 0–5)

## 2021-01-17 PROCEDURE — 87077 CULTURE AEROBIC IDENTIFY: CPT

## 2021-01-17 PROCEDURE — 6370000000 HC RX 637 (ALT 250 FOR IP): Performed by: PHYSICIAN ASSISTANT

## 2021-01-17 PROCEDURE — 87186 SC STD MICRODIL/AGAR DIL: CPT

## 2021-01-17 PROCEDURE — 81001 URINALYSIS AUTO W/SCOPE: CPT

## 2021-01-17 PROCEDURE — 87086 URINE CULTURE/COLONY COUNT: CPT

## 2021-01-17 PROCEDURE — 99284 EMERGENCY DEPT VISIT MOD MDM: CPT

## 2021-01-17 RX ORDER — LIDOCAINE HYDROCHLORIDE 20 MG/ML
JELLY TOPICAL ONCE
Status: COMPLETED | OUTPATIENT
Start: 2021-01-17 | End: 2021-01-17

## 2021-01-17 RX ORDER — CEPHALEXIN 500 MG/1
500 CAPSULE ORAL 4 TIMES DAILY
Qty: 28 CAPSULE | Refills: 0 | Status: SHIPPED | OUTPATIENT
Start: 2021-01-17 | End: 2021-01-24

## 2021-01-17 RX ADMIN — LIDOCAINE HYDROCHLORIDE: 20 JELLY TOPICAL at 18:45

## 2021-01-17 ASSESSMENT — ENCOUNTER SYMPTOMS
DIARRHEA: 0
RESPIRATORY NEGATIVE: 1
ABDOMINAL PAIN: 0
BACK PAIN: 0
VOMITING: 0
GASTROINTESTINAL NEGATIVE: 1
SHORTNESS OF BREATH: 0
CONSTIPATION: 0
CHEST TIGHTNESS: 0
NAUSEA: 0

## 2021-01-17 NOTE — ED PROVIDER NOTES
[address] [facility] ENCOUNTER          PHYSICIAN ASSISTANT NOTE       Date of evaluation: 1/17/2021    Chief Complaint     Chambers catheter problem    History of Present Illness     Sundar Barillas is a 80 y.o. male who presents to the emergency department to have a Chambers catheter replaced. The patient's son states for a while now he has had difficulty urinating since he fell and broke his hip. He has been seen by urology and found to have an enlarged prostate. He is supposed to have surgery however him and his family are waiting until the spikes of the Covid 19 virus are better controlled. Until the surgery he will have a Chambers catheter in place. He had some leaking from the catheter so the home health nurse came to the house today. She attempted to replace the Chambers catheter but was unsuccessful. He was then sent here for Chambers catheter replacement. The patient denies fevers or chills. Denies chest pain, shortness of breath. Denies abdominal pain, nausea or vomiting. Denies rashes or lesions. Has no other complaints. Review of Systems     Review of Systems   Constitutional: Negative for chills and fever. Respiratory: Negative. Negative for chest tightness and shortness of breath. Cardiovascular: Negative. Negative for chest pain. Gastrointestinal: Negative. Negative for abdominal pain, constipation, diarrhea, nausea and vomiting. Genitourinary: Positive for difficulty urinating. Negative for discharge, dysuria, flank pain, frequency, hematuria and urgency. Musculoskeletal: Negative. Negative for back pain and neck pain. Skin: Negative. Negative for rash and wound. Neurological: Negative. Negative for dizziness, weakness, light-headedness and headaches. Psychiatric/Behavioral: Negative. All other systems reviewed and are negative.       Past Medical, Surgical, Family, and Social History     He has a past medical history of Achalasia, Arthritis, 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 symptoms, CAD (coronary artery disease), Cervical spondylosis, DVT (deep venous thrombosis) ([facility] [address] Hemorrhoids, Hyperlipidemia, Hypertension, PUD (peptic ulcer disease), Sciatica, and War injury due to shrapnel. He has a past surgical history that includes Cataract removal (Bilateral); Coronary angioplasty with stent; Hemorrhoid surgery; Appendectomy; hernia repair; repair retinal tear/hole (Left); hip surgery (Left, 8/22/2020); Upper gastrointestinal endoscopy (2018); Cystoscopy (N/A, 8/28/2020); Upper gastrointestinal endoscopy (N/A, 8/31/2020); and Upper gastrointestinal endoscopy (N/A, 8/31/2020). His family history is not on file. He reports that he has never smoked. He has never used smokeless tobacco. He reports that he does not drink alcohol or use drugs.     Medications     Discharge Medication List as of 1/17/2021  7:35 PM      CONTINUE these medications which have NOT CHANGED    Details   diclofenac sodium (VOLTAREN) 1 % GEL Apply 2 g topically 2 times daily To left knee, Topical, 2 TIMES DAILY Starting Fri 10/16/2020, Disp-1 Tube, R-1, Normal      torsemide (DEMADEX) 20 MG tablet Take 1 tablet by mouth daily, Disp-60 tablet, R-5Normal      levothyroxine (SYNTHROID) 50 MCG tablet Take 1 tablet by mouth Daily, Disp-30 tablet, R-5Normal      potassium chloride (KLOR-CON) 10 MEQ extended release tablet Take 1 tablet by mouth daily, Disp-30 tablet, R-5Normal      B Complex Vitamins (VITAMIN B COMPLEX) TABS Take 1 tablet by mouth dailyHistorical Med      acetaminophen (TYLENOL) 325 MG tablet Take 650 mg by mouth every 6 hours as needed for PainHistorical Med      vitamin D (CHOLECALCIFEROL) 25 MCG (1000 UT) TABS tablet Take 1,000 Units by mouth dailyHistorical Med      sennosides-docusate sodium (SENOKOT-S) 8.6-50 MG tablet Take 2 tablets by mouth dailyHistorical Med      atorvastatin (LIPITOR) 80 MG tablet Take 80 mg by mouth nightlyHistorical Med      tamsulosin (FLOMAX) 0.4 MG capsule Take 0.4 mg by mouth nightlyHistorical Med      lisinopril (PRINIVIL;ZESTRIL) 20 MG tablet Take 20 mg by mouth dailyHistorical Med      pantoprazole (PROTONIX) 40 MG tablet Take 40 mg by mouth dailyHistorical Med      ipratropium-albuterol (DUONEB) 0.5-2.5 (3) MG/3ML SOLN nebulizer solution Inhale 1 vial into the lungs every 6 hours as needed for Shortness of BreathHistorical Med      traMADol (ULTRAM) 50 MG tablet Take 50 mg by mouth every 6 hours as needed for Pain. Historical Med      ondansetron (ZOFRAN) 4 MG tablet Take 4 mg by mouth every 6 hours as needed for Nausea or VomitingHistorical Med      finasteride (PROSCAR) 5 MG tablet Take 5 mg by mouth dailyHistorical Med      clopidogrel (PLAVIX) 75 MG tablet Take 75 mg by mouth daily. aspirin 81 MG tablet Take 81 mg by mouth daily. Allergies     He has No Known Allergies. Physical Exam     INITIAL VITALS: BP: 136/64, Temp: 98.3 °F (36.8 °C), Pulse: 84, Resp: 18, SpO2: 100 %  Physical Exam  Vitals signs and nursing note reviewed. Constitutional:       General: He is not in acute distress. Appearance: Normal appearance. He is well-developed. HENT:      Head: Normocephalic and atraumatic. Right Ear: External ear normal.      Left Ear: External ear normal.      Nose: Nose normal.   Eyes:      General:         Right eye: No discharge. Left eye: No discharge. Neck:      Musculoskeletal: Normal range of motion and neck supple. Cardiovascular:      Rate and Rhythm: Normal rate and regular rhythm. Pulses: Normal pulses. Heart sounds: Normal heart sounds. No murmur. Pulmonary:      Effort: Pulmonary effort is normal.      Breath sounds: Normal breath sounds. No wheezing or rhonchi. Abdominal:      General: Bowel sounds are normal. There is no distension. Palpations: Abdomen is soft. Tenderness: There is no abdominal tenderness.  There is no right CVA tenderness, left CVA tenderness, guarding or rebound. Hernia: No hernia is present. Comments: No distention noted. Musculoskeletal: Normal range of motion. Skin:     General: Skin is warm and dry. Capillary Refill: Capillary refill takes less than 2 seconds. Neurological:      General: No focal deficit present. Mental Status: He is alert and oriented to person, place, and time. Psychiatric:         Mood and Affect: Mood normal.         Behavior: Behavior normal.         Thought Content: Thought content normal.         Judgment: Judgment normal.         Diagnostic Results       RADIOLOGY:  No orders to display       LABS:   Results for orders placed or performed during the hospital encounter of 01/17/21   Urinalysis   Result Value Ref Range    Color, UA Yellow Straw/Yellow    Clarity, UA Clear Clear    Glucose, Ur Negative Negative mg/dL    Bilirubin Urine Negative Negative    Ketones, Urine Negative Negative mg/dL    Specific Gravity, UA 1.010 1.005 - 1.030    Blood, Urine LARGE (A) Negative    pH, UA 6.0 5.0 - 8.0    Protein, UA Negative Negative mg/dL    Urobilinogen, Urine 0.2 <2.0 E.U./dL    Nitrite, Urine Negative Negative    Leukocyte Esterase, Urine LARGE (A) Negative    Microscopic Examination YES     Urine Type Other    Microscopic Urinalysis   Result Value Ref Range    WBC, UA >100 (A) 0 - 5 /HPF    RBC, UA  (A) 0 - 4 /HPF    Epithelial Cells, UA 0-1 0 - 5 /HPF    Bacteria, UA Rare (A) None Seen /HPF           RECENT VITALS:  BP: 136/64, Temp: 98.3 °F (36.8 °C), Pulse: 84, Resp: 18, SpO2: 100 %     Procedures         ED Course     Nursing Notes, Past Medical Hx,Past Surgical Hx, Social Hx, Allergies, and Family Hx were reviewed.     The patient was given the following medications:  Orders Placed This Encounter   Medications    lidocaine (XYLOCAINE) 2 % uro-jet    cephALEXin (KEFLEX) 500 MG capsule     Sig: Take 1 capsule by mouth 4 times daily for 7 days     Dispense:  28 capsule     Refill:  0 CONSULTS:  None    MEDICAL DECISION MAKING / ASSESSMENT / Delaney Spaulding is a 80 y.o. male who presented to the emergency department for a Chambers catheter replacement. We were able to place a 16 La[place] Chambers catheter without complication. Urinalysis does show evidence of a urinary tract infection with positive leukocytes and greater than 100 white blood cells. Urine culture was sent and the results are pending. He will be discharged on Keflex but is asked to follow-up with his primary care physician and urologist.  He is to return to the emergency department for worsening symptoms or concerns as discussed with the patient and his son. This patient was also evaluated by the attending physician. All care plans were discussed and agreed upon. Clinical Impression     1. Acute cystitis without hematuria    2.  Chambers catheter problem, initial encounter [facility])        Disposition     PATIENT REFERRED TO:  Michelle Granados MD  [address]  [phone]    Call   for follow up    your urologist    Call   for follow up and re-evaluation      DISCHARGE MEDICATIONS:  Discharge Medication List as of 1/17/2021  7:35 PM      START taking these medications    Details   cephALEXin (KEFLEX) 500 MG capsule Take 1 capsule by mouth 4 times daily for 7 days, Disp-28 capsule, R-0Print             [address] Zhu  01/17/21 2013

## 2021-01-17 NOTE — ED PROVIDER NOTES
ED Attending Attestation Note     Date of evaluation: 1/17/2021    This patient was seen by the advance practice provider. I have seen and examined the patient, agree with the workup, evaluation, management and diagnosis. The care plan has been discussed. My assessment reveals 31-year-old male with history of CAD, DVT currently on Plavix, hypertension hyperlipidemia as well as chronic indwelling Chambers for BPH who presents with difficulty urinating. Patient was having leakage around his indwelling Chambers catheter yesterday so it was removed by home health nurse, was unable to replace catheter prompting ED presentation. On arrival patient's vital signs are reassuring and he is in no acute distress, we will attempt to replace Chambers catheter and obtain urinalysis and hope for discharge home with outpatient urology follow-up. Nursing staff was able to replace patient's Chambers catheter, we will plan for urinalysis to assess for infection and then discharged home with outpatient urology follow-up.      Jasmin Kumar MD  01/17/21 0003

## 2021-01-18 NOTE — ED NOTES
Patient prepared for and ready to be discharged. Patient discharged at this time in no acute distress after verbalizing understanding of discharge instructions. Patient left after receiving After Visit Summary instructions.       Felicitas Urbano RN  01/17/21 [id number]

## 2021-01-19 LAB
ORGANISM: ABNORMAL
ORGANISM: ABNORMAL
URINE CULTURE, ROUTINE: ABNORMAL
URINE CULTURE, ROUTINE: ABNORMAL

## 2021-01-20 ENCOUNTER — TELEPHONE (OUTPATIENT)
Dept: INTERNAL MEDICINE CLINIC | Age: 86
End: 2021-01-20

## 2021-01-20 NOTE — TELEPHONE ENCOUNTER
Spoke to the son when they were at ER told him that urine had two different bacteria put him on an anti biotic. He is on cipro for 10 days and  Just started yesterday 1/20/21  Do you want him to make appt? Follow up urine test?  Blood work? He has had his first covid shot but not second so they are nervous to bring him out because they need to protect him.  Perfer not to have appt because they are trying to reduce risk

## 2021-01-20 NOTE — TELEPHONE ENCOUNTER
Pt currently has a catheter for prostate problems, he wants to wait till he has his covid shot and they had to return to ER to have his catheter replaced, he has a UTI and is put on cipro he wants to follow up .  Please advise please call son Marissa Marks [phone]

## 2021-01-27 ENCOUNTER — TELEPHONE (OUTPATIENT)
Dept: INTERNAL MEDICINE CLINIC | Age: 86
End: 2021-01-27

## 2021-01-27 NOTE — TELEPHONE ENCOUNTER
She just wanted to let you know she is putting him in for a nursing visit. For a urinary infection. [facility].  [phone]

## 2021-02-01 ENCOUNTER — VIRTUAL VISIT (OUTPATIENT)
Dept: INTERNAL MEDICINE CLINIC | Age: 86
End: 2021-02-01
Payer: MEDICARE

## 2021-02-01 DIAGNOSIS — R33.9 URINARY RETENTION: ICD-10-CM

## 2021-02-01 DIAGNOSIS — T83.511D URINARY TRACT INFECTION ASSOCIATED WITH INDWELLING URETHRAL CATHETER, SUBSEQUENT ENCOUNTER: ICD-10-CM

## 2021-02-01 DIAGNOSIS — N39.0 URINARY TRACT INFECTION ASSOCIATED WITH INDWELLING URETHRAL CATHETER, SUBSEQUENT ENCOUNTER: ICD-10-CM

## 2021-02-01 PROBLEM — J69.0 ASPIRATION PNEUMONITIS (HCC): Status: RESOLVED | Noted: 2020-08-26 | Resolved: 2021-02-01

## 2021-02-01 PROBLEM — K31.9 DYSPEPSIA AND DISORDER OF FUNCTION OF STOMACH: Status: RESOLVED | Noted: 2017-07-19 | Resolved: 2021-02-01

## 2021-02-01 PROBLEM — R29.6 MULTIPLE FALLS: Status: RESOLVED | Noted: 2020-08-26 | Resolved: 2021-02-01

## 2021-02-01 PROBLEM — J96.01 ACUTE RESPIRATORY FAILURE WITH HYPOXIA (HCC): Status: RESOLVED | Noted: 2020-08-26 | Resolved: 2021-02-01

## 2021-02-01 PROBLEM — T83.511A URINARY TRACT INFECTION ASSOCIATED WITH INDWELLING URETHRAL CATHETER (HCC): Status: ACTIVE | Noted: 2021-02-01

## 2021-02-01 PROBLEM — R10.13 DYSPEPSIA AND DISORDER OF FUNCTION OF STOMACH: Status: RESOLVED | Noted: 2017-07-19 | Resolved: 2021-02-01

## 2021-02-01 PROBLEM — R21 RASH AND NONSPECIFIC SKIN ERUPTION: Status: RESOLVED | Noted: 2018-07-10 | Resolved: 2021-02-01

## 2021-02-01 PROCEDURE — 4040F PNEUMOC VAC/ADMIN/RCVD: CPT | Performed by: INTERNAL MEDICINE

## 2021-02-01 PROCEDURE — G8510 SCR DEP NEG, NO PLAN REQD: HCPCS | Performed by: INTERNAL MEDICINE

## 2021-02-01 PROCEDURE — G8427 DOCREV CUR MEDS BY ELIG CLIN: HCPCS | Performed by: INTERNAL MEDICINE

## 2021-02-01 PROCEDURE — 1123F ACP DISCUSS/DSCN MKR DOCD: CPT | Performed by: INTERNAL MEDICINE

## 2021-02-01 PROCEDURE — 99214 OFFICE O/P EST MOD 30 MIN: CPT | Performed by: INTERNAL MEDICINE

## 2021-02-01 ASSESSMENT — ENCOUNTER SYMPTOMS: SHORTNESS OF BREATH: 0

## 2021-02-01 ASSESSMENT — PATIENT HEALTH QUESTIONNAIRE - PHQ9
SUM OF ALL RESPONSES TO PHQ QUESTIONS 1-9: 0
2. FEELING DOWN, DEPRESSED OR HOPELESS: 0
SUM OF ALL RESPONSES TO PHQ QUESTIONS 1-9: 0
1. LITTLE INTEREST OR PLEASURE IN DOING THINGS: 0
SUM OF ALL RESPONSES TO PHQ9 QUESTIONS 1 & 2: 0

## 2021-02-01 NOTE — ASSESSMENT & PLAN NOTE
UTI had Klebsiella and Pseudomonas. He received a dose of cefepime in the ER and Keflex as outpatient. His symptoms have cleared. He is at risk for recurrence until he has his prostate surgery and hopefully eventually the catheter is removed. Discussed with patient and his son, Disha Kilgore, they are to call ASAP for any symptoms such as fever, discomfort or change in mental status. Given sensitivities, would consider ciprofloxacin.

## 2021-02-01 NOTE — PROGRESS NOTES
Attention normal.         Behavior: Behavior is not agitated. Behavior is cooperative. Thought Content: Thought content is not delusional.         Cognition and Memory: Cognition is not impaired. ASSESSMENT:       Encounter Diagnoses   Name Primary?  Urinary retention     Urinary tract infection associated with indwelling urethral catheter, subsequent encounter        Urinary retention  Urinary retention after hip fracture surgery. Patient has had a indwelling Chambers since. In the near future he will proceed prostate surgery and have the Chambers removed. See urinary tract infection. Urinary tract infection associated with indwelling urethral catheter ([address]  UTI had Klebsiella and Pseudomonas. He received a dose of cefepime in the ER and Keflex as outpatient. His symptoms have cleared. He is at risk for recurrence until he has his prostate surgery and hopefully eventually the catheter is removed. Discussed with patient and his son, Home Ko, they are to call ASAP for any symptoms such as fever, discomfort or change in mental status. Given sensitivities, would consider ciprofloxacin. PLAN:See ASSESSMENT for evaluation & PLAN     No orders of the defined types were placed in this encounter.    , PMH, SH and FH reviewed and noted. Recent and past labs, tests and consultsalso reviewed. Recent or new meds also reviewed. The patient encounter today included at least 1 out of 3 of the following:   - review of external notes, tests and outside assessments, or   - independent interpretation of outside tests performed by other HCPs, or   - discussion of management of tests and interpretations with outside HCPs.

## 2021-02-01 NOTE — ASSESSMENT & PLAN NOTE
Urinary retention after hip fracture surgery. Patient has had a indwelling Chambers since. In the near future he will proceed prostate surgery and have the Chambers removed. See urinary tract infection.

## 2021-02-09 ENCOUNTER — TELEPHONE (OUTPATIENT)
Dept: INTERNAL MEDICINE CLINIC | Age: 86
End: 2021-02-09

## 2021-02-09 NOTE — TELEPHONE ENCOUNTER
I cannot find the DNR form. Okay to change the senna to as needed. Okay to DC the tramadol, Zofran and ipratropium inhaler.

## 2021-02-10 NOTE — TELEPHONE ENCOUNTER
Kp Barrera with [facility], called stating patient's feet are red/purple when dependent and dark spots on the heels, so she is wondering if they should discontinue the use of the compression stockings? Please call to advise.

## 2021-02-18 ENCOUNTER — HOSPITAL ENCOUNTER (OUTPATIENT)
Age: 86
Setting detail: SPECIMEN
Discharge: HOME | End: 2021-02-18
Payer: MEDICARE

## 2021-02-18 LAB
BACTERIA: ABNORMAL /HPF
BILIRUBIN URINE: NEGATIVE
BLOOD, URINE: ABNORMAL
CLARITY: ABNORMAL
COLOR: YELLOW
COMMENT UA: ABNORMAL
EPITHELIAL CELLS, UA: 1 /HPF (ref 0–5)
GLUCOSE URINE: NEGATIVE MG/DL
HYALINE CASTS: 6 /LPF (ref 0–8)
KETONES, URINE: NEGATIVE MG/DL
LEUKOCYTE ESTERASE, URINE: ABNORMAL
MICROSCOPIC EXAMINATION: YES
NITRITE, URINE: NEGATIVE
PH UA: 6 (ref 5–8)
PROTEIN UA: 100 MG/DL
RBC UA: ABNORMAL /HPF (ref 0–4)
SPECIFIC GRAVITY UA: 1.01 (ref 1–1.03)
URINE TYPE: ABNORMAL
UROBILINOGEN, URINE: 0.2 E.U./DL
WBC UA: >900 /HPF (ref 0–5)
YEAST: PRESENT /HPF

## 2021-02-18 PROCEDURE — 87077 CULTURE AEROBIC IDENTIFY: CPT

## 2021-02-18 PROCEDURE — 87186 SC STD MICRODIL/AGAR DIL: CPT

## 2021-02-18 PROCEDURE — 87086 URINE CULTURE/COLONY COUNT: CPT

## 2021-02-18 PROCEDURE — 81001 URINALYSIS AUTO W/SCOPE: CPT

## 2021-02-19 ENCOUNTER — TELEPHONE (OUTPATIENT)
Dept: INTERNAL MEDICINE CLINIC | Age: 86
End: 2021-02-19

## 2021-02-19 RX ORDER — CIPROFLOXACIN 500 MG/1
500 TABLET, FILM COATED ORAL 2 TIMES DAILY
Qty: 20 TABLET | Refills: 0 | Status: SHIPPED | OUTPATIENT
Start: 2021-02-19 | End: 2021-03-01

## 2021-02-19 NOTE — TELEPHONE ENCOUNTER
Romaine Donovan with [facility] called stating she saw the patient last night to change his gonzales cath and his urine was extremely cloudy, white sediment and mucus and a strong odor but no other signs and symptoms. She dropped off a urine speciman @ [id number] [facility] last night,, so you should be able to access the results today. Please call to advise.

## 2021-03-02 ENCOUNTER — TELEPHONE (OUTPATIENT)
Dept: INTERNAL MEDICINE CLINIC | Age: 86
End: 2021-03-02

## 2021-03-02 NOTE — LETTER
[facility] [address]  [address] [address]  Phone: [phone]  Fax: [phone]    Cheikh Alcala MD        March 3, 2021     Patient: Kwadwo Elias   YOB: 1925   Date of Visit: 3/2/2021       To Whom It May Concern:    Richard Smyth is the primary caregiver for Lakesha Orozco which requires constant care giving assistance. Please excuse Richard Smyth from jury duty  If you have any questions or concerns, please don't hesitate to call.   Vivienne Krishna # [id number]    Sincerely,        Cheikh Alcala MD

## 2021-03-02 NOTE — TELEPHONE ENCOUNTER
Patient son called stating that he is his father's primary caregiver and he received a notice from [facility] to show up for jury duty on March 8, 2021. He wants to know if you could please write a letter, that he can submit to the court so he does not have to go? The letter has to say, he is the primary caregiver, and he can not leave this patient alone. It have the jury # [id number] listed on the letter. Can you please e-mail letter to him ASAP @ [email or web address]? Please call to advise.

## 2021-03-03 NOTE — TELEPHONE ENCOUNTER
Okay to give son a note stating he is the primary caregiver for patient that requires constant caregiving assistance. Yanet Russell #[id number].     email to son

## 2021-05-04 RX ORDER — POTASSIUM CHLORIDE 750 MG/1
TABLET, FILM COATED, EXTENDED RELEASE ORAL
Qty: 30 TABLET | Refills: 4 | Status: SHIPPED | OUTPATIENT
Start: 2021-05-04 | End: 2021-07-08 | Stop reason: SDUPTHER

## 2021-07-08 RX ORDER — FINASTERIDE 5 MG/1
5 TABLET, FILM COATED ORAL DAILY
Qty: 30 TABLET | Refills: 4 | Status: SHIPPED | OUTPATIENT
Start: 2021-07-08

## 2021-07-08 RX ORDER — POTASSIUM CHLORIDE 750 MG/1
TABLET, FILM COATED, EXTENDED RELEASE ORAL
Qty: 30 TABLET | Refills: 4 | Status: SHIPPED | OUTPATIENT
Start: 2021-07-08

## 2021-07-08 RX ORDER — TORSEMIDE 20 MG/1
20 TABLET ORAL DAILY
Qty: 60 TABLET | Refills: 4 | Status: SHIPPED | OUTPATIENT
Start: 2021-07-08

## 2021-11-11 ENCOUNTER — ANESTHESIA EVENT (OUTPATIENT)
Dept: ENDOSCOPY | Age: 86
End: 2021-11-11
Payer: MEDICARE

## 2021-11-11 ENCOUNTER — TELEPHONE (OUTPATIENT)
Dept: INTERNAL MEDICINE CLINIC | Age: 86
End: 2021-11-11

## 2021-11-12 ENCOUNTER — HOSPITAL ENCOUNTER (OUTPATIENT)
Age: 86
Setting detail: OUTPATIENT SURGERY
Discharge: HOME | End: 2021-11-12
Attending: INTERNAL MEDICINE | Admitting: INTERNAL MEDICINE
Payer: MEDICARE

## 2021-11-12 ENCOUNTER — ANESTHESIA (OUTPATIENT)
Dept: ENDOSCOPY | Age: 86
End: 2021-11-12
Payer: MEDICARE

## 2021-11-12 VITALS
RESPIRATION RATE: 16 BRPM | HEIGHT: 74 IN | TEMPERATURE: 97.5 F | OXYGEN SATURATION: 100 % | DIASTOLIC BLOOD PRESSURE: 79 MMHG | HEART RATE: 50 BPM | BODY MASS INDEX: 23.1 KG/M2 | WEIGHT: 180 LBS | SYSTOLIC BLOOD PRESSURE: 134 MMHG

## 2021-11-12 VITALS
DIASTOLIC BLOOD PRESSURE: 53 MMHG | RESPIRATION RATE: 13 BRPM | OXYGEN SATURATION: 99 % | SYSTOLIC BLOOD PRESSURE: 114 MMHG

## 2021-11-12 PROCEDURE — 3700000001 HC ADD 15 MINUTES (ANESTHESIA): Performed by: INTERNAL MEDICINE

## 2021-11-12 PROCEDURE — 2580000003 HC RX 258: Performed by: NURSE ANESTHETIST, CERTIFIED REGISTERED

## 2021-11-12 PROCEDURE — 3700000000 HC ANESTHESIA ATTENDED CARE: Performed by: INTERNAL MEDICINE

## 2021-11-12 PROCEDURE — 3609013800 HC EGD SUBMUCOSAL/BOTOX INJECTION: Performed by: INTERNAL MEDICINE

## 2021-11-12 PROCEDURE — 6360000002 HC RX W HCPCS: Performed by: NURSE ANESTHETIST, CERTIFIED REGISTERED

## 2021-11-12 PROCEDURE — 2500000003 HC RX 250 WO HCPCS: Performed by: NURSE ANESTHETIST, CERTIFIED REGISTERED

## 2021-11-12 PROCEDURE — 7100000011 HC PHASE II RECOVERY - ADDTL 15 MIN: Performed by: INTERNAL MEDICINE

## 2021-11-12 PROCEDURE — 6360000002 HC RX W HCPCS: Performed by: INTERNAL MEDICINE

## 2021-11-12 PROCEDURE — 2709999900 HC NON-CHARGEABLE SUPPLY: Performed by: INTERNAL MEDICINE

## 2021-11-12 PROCEDURE — 7100000010 HC PHASE II RECOVERY - FIRST 15 MIN: Performed by: INTERNAL MEDICINE

## 2021-11-12 RX ORDER — LIDOCAINE HYDROCHLORIDE 20 MG/ML
INJECTION, SOLUTION INFILTRATION; PERINEURAL PRN
Status: DISCONTINUED | OUTPATIENT
Start: 2021-11-12 | End: 2021-11-12 | Stop reason: SDUPTHER

## 2021-11-12 RX ORDER — PROPOFOL 10 MG/ML
INJECTION, EMULSION INTRAVENOUS PRN
Status: DISCONTINUED | OUTPATIENT
Start: 2021-11-12 | End: 2021-11-12 | Stop reason: SDUPTHER

## 2021-11-12 RX ORDER — SODIUM CHLORIDE, SODIUM LACTATE, POTASSIUM CHLORIDE, CALCIUM CHLORIDE 600; 310; 30; 20 MG/100ML; MG/100ML; MG/100ML; MG/100ML
INJECTION, SOLUTION INTRAVENOUS CONTINUOUS PRN
Status: DISCONTINUED | OUTPATIENT
Start: 2021-11-12 | End: 2021-11-12 | Stop reason: SDUPTHER

## 2021-11-12 RX ORDER — SODIUM CHLORIDE, SODIUM LACTATE, POTASSIUM CHLORIDE, CALCIUM CHLORIDE 600; 310; 30; 20 MG/100ML; MG/100ML; MG/100ML; MG/100ML
INJECTION, SOLUTION INTRAVENOUS CONTINUOUS
Status: DISCONTINUED | OUTPATIENT
Start: 2021-11-12 | End: 2021-11-12 | Stop reason: HOSPADM

## 2021-11-12 RX ADMIN — PROPOFOL 50 MG: 10 INJECTION, EMULSION INTRAVENOUS at 13:38

## 2021-11-12 RX ADMIN — PROPOFOL 50 MG: 10 INJECTION, EMULSION INTRAVENOUS at 13:44

## 2021-11-12 RX ADMIN — PROPOFOL 50 MG: 10 INJECTION, EMULSION INTRAVENOUS at 13:42

## 2021-11-12 RX ADMIN — PROPOFOL 100 MG: 10 INJECTION, EMULSION INTRAVENOUS at 13:33

## 2021-11-12 RX ADMIN — SODIUM CHLORIDE, SODIUM LACTATE, POTASSIUM CHLORIDE, AND CALCIUM CHLORIDE: .6; .31; .03; .02 INJECTION, SOLUTION INTRAVENOUS at 13:27

## 2021-11-12 RX ADMIN — LIDOCAINE HYDROCHLORIDE 100 MG: 20 INJECTION, SOLUTION INFILTRATION; PERINEURAL at 13:33

## 2021-11-12 RX ADMIN — PROPOFOL 50 MG: 10 INJECTION, EMULSION INTRAVENOUS at 13:35

## 2021-11-12 ASSESSMENT — PULMONARY FUNCTION TESTS
PIF_VALUE: 1

## 2021-11-12 ASSESSMENT — PAIN - FUNCTIONAL ASSESSMENT
PAIN_FUNCTIONAL_ASSESSMENT: 0-10
PAIN_FUNCTIONAL_ASSESSMENT: FACES

## 2021-11-12 ASSESSMENT — PAIN SCALES - GENERAL: PAINLEVEL_OUTOF10: 0
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
## 2021-11-12 NOTE — PROCEDURES
[address] [place] [address]  Endoscopy Note    Patient: Lyndsay Mendoza  : 1925  Acct#:     Procedure: Esophagogastroduodenoscopy with Botox injection     Date:  2021     Surgeon:  Chaz Velazquez MD      Anesthesia:    IV propofol, per anesthesia       EBL: <50 mL    Indications:  Achalasia, dysphagia     Description of Procedure:    Informed consent was obtained from the patient after explanation of indications, benefits and possible risks and complications of the procedure. The patient was then taken to the endoscopy suite, placed in the left lateral decubitus position and the above IV sedation was administrered. The Olympus videoendoscope (GIF-H190) was placed in the patient's mouth and under direct visualization passed into the esophagus. The scope was then advanced into the stomach and to the first portion of the duodenum. A retroflexed exam of the gastric cardia and fundus was performed. The scope was then withdrawn back into the stomach, it was decompressed, and the scope was completely withdrawn. Findings:  Normal first portion of the duodenum. Normal stomach. A dilated esophagus full of debris was found. Failure of the LES to relax consistent with achalasia. 100 Units of Botox was injec michael in four-quadrant fashion 1 cm above the gastroesophageal junction. The patient tolerated the procedure well and was taken to the post anesthesia care unit in good condition. Biopsies: no     Impression:    1. Normal first portion of the duodenum. 2.  Normal stomach. 3. A dilated esophagus full of debris was found. Failure of the LES to relax consistent with achalasia. 100 Units of Botox was injec michael in four-quadrant fashion 1 cm above the gastroesophageal junction. Recommendations: Follow up as needed.        Chaz Velazquez MD  [facility]/Located within [facility]
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
## 2021-11-12 NOTE — ANESTHESIA POSTPROCEDURE EVALUATION
Department of Anesthesiology  Postprocedure Note    Patient: Melanie Wooten  MRN: [id number]  YOB: 1925  Date of evaluation: 11/12/2021  Time:  1:57 PM     Procedure Summary     Date: 11/12/21 Room / Location: [facility]    Anesthesia Start: 8560 Anesthesia Stop: 6946    Procedure: EGD SUBMUCOSAL/BOTOX INJECTION (N/A ) Diagnosis:       Achalasia      (Achalasia [K22.0])    Surgeons: Matthias Sr MD Responsible Provider: James Matthew MD    Anesthesia Type: MAC ASA Status: 3          Anesthesia Type: MAC    Maria Esther Phase I: Maria Esther Score: 10    Maria Esther Phase II:      Last vitals: Reviewed and per EMR flowsheets.        Anesthesia Post Evaluation    Patient location during evaluation: PACU  Patient participation: complete - patient participated  Level of consciousness: awake and lethargic  Airway patency: patent  Nausea & Vomiting: no nausea and no vomiting  Complications: no  Cardiovascular status: hemodynamically stable and blood pressure returned to baseline  Respiratory status: spontaneous ventilation and nasal cannula  Hydration status: stable

## 2021-11-12 NOTE — PROGRESS NOTES
Ambulatory Surgery/Procedure Discharge Note    Patient tolerated procedure well. Patient denies nausea or pain post procedure. Discharge instructions and education reviewed with patient and Son. Written instructions provided at discharge. Patient discharged ambulatory in wheelchair to car. Son to drive pt home. Vitals:    11/12/21 1413   BP: 134/79   Pulse: 50   Resp: 16   Temp:    SpO2: 100%       In: 600 [I.V.:600]  Out: -     Restroom use offered before discharge. Yes    Pain assessment:  none  Pain Level: 0        Patient discharged to home/self care.  Patient discharged via wheel chair by transporter to waiting family/S.O.       11/12/2021 1445 PM

## 2021-11-17 ENCOUNTER — TELEPHONE (OUTPATIENT)
Dept: INTERNAL MEDICINE CLINIC | Age: 86
End: 2021-11-17

## 2021-11-17 NOTE — TELEPHONE ENCOUNTER
[address] [facility] called into the office for a verbal for  PT and OT for 1 time a  week for 4 weeks. Pt has been receiving physical therapy. Please advise and call.

## 2021-12-09 ENCOUNTER — TELEPHONE (OUTPATIENT)
Dept: INTERNAL MEDICINE CLINIC | Age: 86
End: 2021-12-09

## 2021-12-09 NOTE — TELEPHONE ENCOUNTER
Luz Valencia called and asked if someone can sign off on his home health papers or if not they may have to discharge him       Please advise and call

## 2022-01-05 ENCOUNTER — OFFICE VISIT (OUTPATIENT)
Dept: INTERNAL MEDICINE CLINIC | Age: 87
End: 2022-01-05
Payer: MEDICARE

## 2022-01-05 VITALS
TEMPERATURE: 97.9 F | BODY MASS INDEX: 23.1 KG/M2 | OXYGEN SATURATION: 100 % | DIASTOLIC BLOOD PRESSURE: 72 MMHG | SYSTOLIC BLOOD PRESSURE: 124 MMHG | HEIGHT: 74 IN | HEART RATE: 51 BPM | WEIGHT: 180 LBS

## 2022-01-05 DIAGNOSIS — N13.8 BPH WITH OBSTRUCTION/LOWER URINARY TRACT SYMPTOMS: ICD-10-CM

## 2022-01-05 DIAGNOSIS — E55.9 VITAMIN D DEFICIENCY: ICD-10-CM

## 2022-01-05 DIAGNOSIS — I10 BENIGN ESSENTIAL HTN: Primary | ICD-10-CM

## 2022-01-05 DIAGNOSIS — E78.2 MIXED HYPERLIPIDEMIA: ICD-10-CM

## 2022-01-05 DIAGNOSIS — I50.32 CHRONIC DIASTOLIC CHF (CONGESTIVE HEART FAILURE) (HCC): ICD-10-CM

## 2022-01-05 DIAGNOSIS — E03.9 ACQUIRED HYPOTHYROIDISM: ICD-10-CM

## 2022-01-05 DIAGNOSIS — M62.81 ABNORMAL GAIT DUE TO MUSCLE WEAKNESS: ICD-10-CM

## 2022-01-05 DIAGNOSIS — R26.9 ABNORMAL GAIT DUE TO MUSCLE WEAKNESS: ICD-10-CM

## 2022-01-05 DIAGNOSIS — R73.9 HYPERGLYCEMIA: ICD-10-CM

## 2022-01-05 DIAGNOSIS — D64.9 ANEMIA, UNSPECIFIED TYPE: ICD-10-CM

## 2022-01-05 DIAGNOSIS — N40.1 BPH WITH OBSTRUCTION/LOWER URINARY TRACT SYMPTOMS: ICD-10-CM

## 2022-01-05 PROCEDURE — G8484 FLU IMMUNIZE NO ADMIN: HCPCS | Performed by: INTERNAL MEDICINE

## 2022-01-05 PROCEDURE — G8427 DOCREV CUR MEDS BY ELIG CLIN: HCPCS | Performed by: INTERNAL MEDICINE

## 2022-01-05 PROCEDURE — G8420 CALC BMI NORM PARAMETERS: HCPCS | Performed by: INTERNAL MEDICINE

## 2022-01-05 PROCEDURE — 4040F PNEUMOC VAC/ADMIN/RCVD: CPT | Performed by: INTERNAL MEDICINE

## 2022-01-05 PROCEDURE — 99214 OFFICE O/P EST MOD 30 MIN: CPT | Performed by: INTERNAL MEDICINE

## 2022-01-05 PROCEDURE — 1123F ACP DISCUSS/DSCN MKR DOCD: CPT | Performed by: INTERNAL MEDICINE

## 2022-01-05 PROCEDURE — 1036F TOBACCO NON-USER: CPT | Performed by: INTERNAL MEDICINE

## 2022-01-05 RX ORDER — DOCUSATE SODIUM 100 MG/1
100 CAPSULE, LIQUID FILLED ORAL DAILY
COMMUNITY

## 2022-01-05 SDOH — ECONOMIC STABILITY: FOOD INSECURITY: WITHIN THE PAST 12 MONTHS, YOU WORRIED THAT YOUR FOOD WOULD RUN OUT BEFORE YOU GOT MONEY TO BUY MORE.: NEVER TRUE

## 2022-01-05 SDOH — ECONOMIC STABILITY: FOOD INSECURITY: WITHIN THE PAST 12 MONTHS, THE FOOD YOU BOUGHT JUST DIDN'T LAST AND YOU DIDN'T HAVE MONEY TO GET MORE.: NEVER TRUE

## 2022-01-05 ASSESSMENT — ENCOUNTER SYMPTOMS
COUGH: 0
SHORTNESS OF BREATH: 0
VOMITING: 0
RHINORRHEA: 0
SORE THROAT: 0
TROUBLE SWALLOWING: 0
EYE DISCHARGE: 0
ABDOMINAL PAIN: 0
WHEEZING: 0
BLOOD IN STOOL: 0
CHEST TIGHTNESS: 0
NAUSEA: 0
SINUS PAIN: 0
SINUS PRESSURE: 0
EYE PAIN: 0

## 2022-01-05 ASSESSMENT — SOCIAL DETERMINANTS OF HEALTH (SDOH): HOW HARD IS IT FOR YOU TO PAY FOR THE VERY BASICS LIKE FOOD, HOUSING, MEDICAL CARE, AND HEATING?: NOT HARD AT ALL

## 2022-01-05 NOTE — PROGRESS NOTES
2022    Dipti Tejeda (: 1925) is a 80 y.o. male, here for evaluation of the following medical concerns:    Chief Complaint   Patient presents with    Hypertension     to become established     Hyperlipidemia    Hypothyroidism       He has been seen here off-and-on and he has been needing home health care with abnormal gait with weakness in the legs and now therapy is finishing with OT and PT at home and he is able to do more and explained to him to do more exercises with walking and see how he does with that and son taking care of him at home otherwise another son is a doctor and deciding on different treatments and he does go to [facility] 2-3 times a year other than with COVID-19 he has not been able to go and usually has been getting all medications through them all the home health and therapy and other orders we're taking care of that and I do not see any blood work lately and explained to him that he was anemic and he denies any blood loss in the urine or bowels and he does need blood work done and he does not want to go to [facility] for now. He does need grab bars and shower chair and bedside commode as he has been getting weak and not able to do any of those activities without help. History UTI extensive counseling done on fluids. Anemia we'll do the work-up and see what that shows. Hypertension    Watching low-sodium diet. Taking blood pressure medications regularly. Blood pressure checked off and on and trying to keep a goal of blood pressure less than 130/85 most of the time. Denies any chest pain / palpitation / shortness of breath / lightheadedness etc.   The available labs reviewed and analyzed and independent interpretation of the results explained at length.   Lab Results       Component                Value               Date                       NA                       135                 2020                 K                        3.4                 2020 K                        3.9                 08/28/2020                 CL                       104                 09/02/2020                 CO2                      25                  09/02/2020                 BUN                      23                  09/02/2020                 CREATININE               1.1                 09/02/2020                 GLUCOSE                  123                 09/02/2020                 CALCIUM                  7.8                 09/02/2020                Hyperlipidemia    he has been watching low fat diet. Reminded to keep doing regular exercise 3 to 4 days a week. Must keep trying to lose weight.    he has been tolerating cholesterol medications without any side effects. The available labs reviewed and analyzed and independent interpretation of the results explained at length. Lab Results       Component                Value               Date                       CHOL                     148                 07/19/2017                 TRIG                     59                  07/19/2017                 HDL                      65 (H)              07/19/2017                 ALT                      165 (H)             07/19/2017                 AST                      97 (H)              07/19/2017              Hypothyroidism    Taking thyroid medication properly and regularly. Denies any side effects from medication. The available labs reviewed and analyzed and independent interpretation of the results explained at length. Lab Results       Component                Value               Date                       T4FREE                   1.3                 07/19/2017                 TSH                      7.90                07/19/2017                 TSHREFLEX                7.06                10/14/2013              Gastroesophageal Reflux Disease:    Watching gastroesophageal reflux diet.   Taking medications as prescribed without any side effects. Working on trying to lose weight. Working on trying not to eat for 3 to 4 hours before lying down. Chr gonzales--[place]--risk of holding penis---falling and fracture--recurrent UTI---urologist changing catheter q 30 days      Review of Systems   Constitutional: Negative for appetite change, chills, fever and unexpected weight change. HENT: Negative for congestion, ear discharge, ear pain, nosebleeds, rhinorrhea, sinus pressure, sinus pain, sore throat and trouble swallowing. Eyes: Negative for pain and discharge. Respiratory: Negative for cough, chest tightness, shortness of breath and wheezing. Cardiovascular: Negative for chest pain, palpitations and leg swelling. Gastrointestinal: Negative for abdominal pain, blood in stool, nausea and vomiting. Endocrine: Negative for polydipsia and polyphagia. Genitourinary: Negative for difficulty urinating, enuresis, flank pain and hematuria. Chronic Gonzales catheter with combined decision with the urologist and family. Musculoskeletal: Positive for gait problem. Negative for myalgias. Abnormal gait with muscle weakness   Skin: Negative for rash. Neurological: Negative for facial asymmetry, weakness, light-headedness, numbness and headaches. Psychiatric/Behavioral: Negative for confusion.        Current Outpatient Medications on File Prior to Visit   Medication Sig Dispense Refill    docusate sodium (COLACE) 100 MG capsule Take 100 mg by mouth daily      potassium chloride (KLOR-CON) 10 MEQ extended release tablet TAKE ONE TABLET BY MOUTH DAILY 30 tablet 4    finasteride (PROSCAR) 5 MG tablet Take 1 tablet by mouth daily 30 tablet 4    torsemide (DEMADEX) 20 MG tablet Take 1 tablet by mouth daily 60 tablet 4    diclofenac sodium (VOLTAREN) 1 % GEL APPLY TWO GRAMS TO THE AFFECTED AREA OF LEFT KNEE TWO TIMES DAILY 100 g 0    levothyroxine (SYNTHROID) 50 MCG tablet Take 1 tablet by mouth Daily 30 tablet 5    B Complex Vitamins (VITAMIN B COMPLEX) TABS Take 1 tablet by mouth daily      acetaminophen (TYLENOL) 325 MG tablet Take 650 mg by mouth every 6 hours as needed for Pain      vitamin D (CHOLECALCIFEROL) 25 MCG (1000 UT) TABS tablet Take 1,000 Units by mouth daily      atorvastatin (LIPITOR) 80 MG tablet Take 80 mg by mouth nightly      lisinopril (PRINIVIL;ZESTRIL) 20 MG tablet Take 20 mg by mouth daily      pantoprazole (PROTONIX) 40 MG tablet Take 40 mg by mouth 2 times daily       aspirin 81 MG tablet Take 81 mg by mouth daily. No current facility-administered medications on file prior to visit.       No Known Allergies  Past Medical History:   Diagnosis Date    Achalasia     Arthritis     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     [facility]    CAD (coronary artery disease)     stents x 3 - dr guille [facility] Cervical spondylosis     DVT (deep venous thrombosis) ([facility] [address] 2003    right leg    Hemorrhoids     Hyperlipidemia     Hypertension     PUD (peptic ulcer disease)     Sciatica     War injury due to shrapnel     leg     Past Surgical History:   Procedure Laterality Date    APPENDECTOMY      [facility]    CORONARY ANGIOPLASTY WITH STENT PLACEMENT      CYSTOSCOPY N/A 8/28/2020    CYSTOSCOPY AND EVACUATION OF CLOTS performed by Erick Shetty MD at [facility]      inguinal    HIP SURGERY Left 8/22/2020    LEFT HIP PERCUTANUOUS PINNING performed by Ishan Gimenez DO at [address] Left     laser repair   [place] Marks  2018    Dr. Milena Jolley, Botox   [place] N/A 8/31/2020    EGD SUBMUCOSAL/BOTOX INJECTION performed by Allie An MD at [address] [facility] 8/31/2020    EGD DILATION BALLOON performed by Allie An MD at [address] [facility] 11/12/2021    EGD SUBMUCOSAL/BOTOX INJECTION performed by David Celestin MD at [address] History     Tobacco Use    Smoking status: Never Smoker    Smokeless tobacco: Never Used   Substance Use Topics    Alcohol use: No      History reviewed. No pertinent family history. Vitals:    01/05/22 1533   BP: 124/72   Site: Left Upper Arm   Position: Sitting   Cuff Size: Medium Adult   Pulse: 51   Temp: 97.9 °F (36.6 °C)   TempSrc: Temporal   SpO2: 100%   Weight: 180 lb (81.6 kg)   Height: 6' 2\" (1.88 m)     Estimated body mass index is 23.11 kg/m² as calculated from the following:    Height as of this encounter: 6' 2\" (1.88 m). Weight as of this encounter: 180 lb (81.6 kg). Physical Exam  Vitals and nursing note reviewed. Constitutional:       General: He is not in acute distress. HENT:      Head: Normocephalic and atraumatic. Right Ear: Tympanic membrane, ear canal and external ear normal.      Left Ear: Tympanic membrane, ear canal and external ear normal.      Nose: Nose normal.      Mouth/Throat:      Mouth: Mucous membranes are moist.      Pharynx: No posterior oropharyngeal erythema. Eyes:      General: Lids are normal.      Conjunctiva/sclera: Conjunctivae normal.      Pupils: Pupils are equal, round, and reactive to light. Neck:      Thyroid: No thyromegaly. Vascular: No JVD. Trachea: No tracheal deviation. Cardiovascular:      Rate and Rhythm: Normal rate and regular rhythm. Heart sounds: Normal heart sounds. No gallop. Pulmonary:      Effort: Pulmonary effort is normal. No respiratory distress. Breath sounds: Normal breath sounds. No wheezing or rales. Abdominal:      General: Bowel sounds are normal.      Palpations: Abdomen is soft. There is no mass. Tenderness: There is no abdominal tenderness. Genitourinary:     Comments: Chronic Chambers catheter urine is clear right now. Musculoskeletal:         General: No tenderness. Cervical back: Neck supple.       Comments: No leg edema or calf tenderness   Lymphadenopathy:      Cervical: No cervical adenopathy. Skin:     General: Skin is warm and dry. Findings: No rash. Neurological:      Mental Status: He is alert and oriented to person, place, and time. Cranial Nerves: No cranial nerve deficit. Sensory: No sensory deficit. Motor: Weakness present. Gait: Gait abnormal.   Psychiatric:         Behavior: Behavior normal.         Thought Content: Thought content normal.         ASSESSMENT/PLAN:  1. Benign essential HTN    - Comprehensive Metabolic Panel; Future    2. Mixed hyperlipidemia    - Lipid Panel; Future    3. Vitamin D deficiency    - Vitamin D 25 Hydroxy; Future    4. Acquired hypothyroidism    - T4, Free; Future  - TSH with Reflex; Future    5. 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 symptoms    - PSA, Prostatic Specific Antigen; Future    6. Chronic diastolic CHF (congestive heart failure) (HCC)  Diuretics    7. Hyperglycemia    - Hemoglobin A1C; Future    8. Anemia, unspecified type    - CBC Auto Differential; Future  - IRON SATURATION; Future  - Iron and TIBC; Future  - Methylmalonic Acid, Serum; Future  - Folate; Future  - Ferritin; Future    9. Abnormal gait due to muscle weakness    - MD BATH/SHOWER CHAIR  - DME Order for Bedside Commode as OP  - Misc. Devices (WALL GRAB BAR) MISC; Need to grab bars to transfer in and out of bathtub  Dispense: 2 each; Refill: 0      Return in about 6 months (around 7/5/2022) for AWV. Patient Instructions   Sign up for my chart. Grab bars and shower chair and bedside commode. Fasting blood work. At least 64 ounce water a day. 20 to 30minutes walking after each meal 3 times a day. Cranberry's or cranberry juice every day. Hyperlipidemia    Explained -- to keep least cheese butter or fried food. Grilled baked or broiled meats. No breaded meats. Keep trying to get to a healthy weight.   Keep regular exercise program.  Keep taking

## 2022-01-05 NOTE — PATIENT INSTRUCTIONS
Sign up for my chart. Grab bars and shower chair and bedside commode. Fasting blood work. At least 64 ounce water a day. 20 to 30minutes walking after each meal 3 times a day. Cranberry's or cranberry juice every day. Hyperlipidemia    Explained -- to keep least cheese butter or fried food. Grilled baked or broiled meats. No breaded meats. Keep trying to get to a healthy weight. Keep regular exercise program.  Keep taking cholesterol lowering medication regularly. Keep cardiologist follow-up.     gerd diet. [facility]    Discussed use, benefit, and side effects of prescribed medications. Barriers to compliance discussed. All patient questions answered. Pt voiced understanding. IF YOU NEED A PRESCRIPTION REFILL, THEN PLEASE GIVE US THREE WORKING DAYS TO REFILL A PRESCRIPTION. Office Hours to Answer Questions--Tuesday thru Friday --9.00 AM to 4.00 PM    Please get all OVER DUE VACCINATIONS done at the pharmacy or health department as soon as possible.

## 2022-01-06 ENCOUNTER — TELEPHONE (OUTPATIENT)
Dept: INTERNAL MEDICINE CLINIC | Age: 87
End: 2022-01-06

## 2022-04-11 NOTE — PLAN OF CARE
Problem: Coping:  Goal: Expressions of feelings of enhanced comfort will increase  Description: Expressions of feelings of enhanced comfort will increase  Outcome: Ongoing  Note: Pt does have gonzales in, voiding tea colored urine. Will continue to monitor. Problem: Skin Integrity:  Goal: Will show no infection signs and symptoms  Description: Will show no infection signs and symptoms  Outcome: Ongoing  Note: Will continue monitor pt vs, labs, surgical sites, assess skin, and other indictors of infection. normal... Well appearing, awake, alert, oriented to person, place, time/situation and in no apparent distress.

## 2022-05-12 ENCOUNTER — OFFICE VISIT (OUTPATIENT)
Dept: INTERNAL MEDICINE CLINIC | Age: 87
End: 2022-05-12
Payer: MEDICARE

## 2022-05-12 VITALS
DIASTOLIC BLOOD PRESSURE: 60 MMHG | HEART RATE: 92 BPM | BODY MASS INDEX: 22.23 KG/M2 | HEIGHT: 74 IN | OXYGEN SATURATION: 98 % | WEIGHT: 173.2 LBS | TEMPERATURE: 98.6 F | SYSTOLIC BLOOD PRESSURE: 110 MMHG

## 2022-05-12 DIAGNOSIS — Z00.00 INITIAL MEDICARE ANNUAL WELLNESS VISIT: Primary | ICD-10-CM

## 2022-05-12 DIAGNOSIS — E87.6 HYPOKALEMIA: ICD-10-CM

## 2022-05-12 DIAGNOSIS — E03.9 ACQUIRED HYPOTHYROIDISM: ICD-10-CM

## 2022-05-12 DIAGNOSIS — Z97.8 INDWELLING FOLEY CATHETER PRESENT: ICD-10-CM

## 2022-05-12 DIAGNOSIS — E78.2 MIXED HYPERLIPIDEMIA: ICD-10-CM

## 2022-05-12 DIAGNOSIS — E55.9 VITAMIN D DEFICIENCY: ICD-10-CM

## 2022-05-12 DIAGNOSIS — K21.00 GASTROESOPHAGEAL REFLUX DISEASE WITH ESOPHAGITIS WITHOUT HEMORRHAGE: ICD-10-CM

## 2022-05-12 DIAGNOSIS — I10 PRIMARY HYPERTENSION: ICD-10-CM

## 2022-05-12 PROCEDURE — 1123F ACP DISCUSS/DSCN MKR DOCD: CPT | Performed by: INTERNAL MEDICINE

## 2022-05-12 PROCEDURE — 99213 OFFICE O/P EST LOW 20 MIN: CPT | Performed by: INTERNAL MEDICINE

## 2022-05-12 PROCEDURE — 4040F PNEUMOC VAC/ADMIN/RCVD: CPT | Performed by: INTERNAL MEDICINE

## 2022-05-12 PROCEDURE — G8420 CALC BMI NORM PARAMETERS: HCPCS | Performed by: INTERNAL MEDICINE

## 2022-05-12 PROCEDURE — G0438 PPPS, INITIAL VISIT: HCPCS | Performed by: INTERNAL MEDICINE

## 2022-05-12 PROCEDURE — G8427 DOCREV CUR MEDS BY ELIG CLIN: HCPCS | Performed by: INTERNAL MEDICINE

## 2022-05-12 PROCEDURE — 1036F TOBACCO NON-USER: CPT | Performed by: INTERNAL MEDICINE

## 2022-05-12 ASSESSMENT — LIFESTYLE VARIABLES: HOW OFTEN DO YOU HAVE A DRINK CONTAINING ALCOHOL: NEVER

## 2022-05-12 ASSESSMENT — ENCOUNTER SYMPTOMS
SORE THROAT: 0
SINUS PRESSURE: 0
VOMITING: 0
NAUSEA: 0
EYE DISCHARGE: 0
SINUS PAIN: 0
CHEST TIGHTNESS: 0
WHEEZING: 0
TROUBLE SWALLOWING: 0
SHORTNESS OF BREATH: 0
RHINORRHEA: 0
ABDOMINAL PAIN: 0
EYE PAIN: 0
COUGH: 0
BLOOD IN STOOL: 0

## 2022-05-12 ASSESSMENT — PATIENT HEALTH QUESTIONNAIRE - PHQ9
1. LITTLE INTEREST OR PLEASURE IN DOING THINGS: 0
SUM OF ALL RESPONSES TO PHQ9 QUESTIONS 1 & 2: 0
SUM OF ALL RESPONSES TO PHQ QUESTIONS 1-9: 0
2. FEELING DOWN, DEPRESSED OR HOPELESS: 0
SUM OF ALL RESPONSES TO PHQ QUESTIONS 1-9: 0

## 2022-05-12 NOTE — PROGRESS NOTES
Medicare Annual Wellness Visit    Kamilah Khoury is here for Medicare AWV    Assessment & Plan   Initial Medicare annual wellness visit  Mixed hyperlipidemia  Vitamin D deficiency  Indwelling Chambers catheter present  Hypokalemia  Primary hypertension  Gastroesophageal reflux disease with esophagitis without hemorrhage  Acquired hypothyroidism      Recommendations for Preventive Services Due: see orders and patient instructions/AVS.  Recommended screening schedule for the next 5-10 years is provided to the patient in written form: see Patient Instructions/AVS.     Return in 4 months (on 9/12/2022) for blood pressure, high cholesterol. Subjective   The following acute and/or chronic problems were also addressed today:    Patient's complete Health Risk Assessment and screening values have been reviewed and are found in Flowsheets. The following problems were reviewed today and where indicated follow up appointments were made and/or referrals ordered.     Positive Risk Factor Screenings with Interventions:    Fall Risk:  Do you feel unsteady or are you worried about falling? : (!) yes  2 or more falls in past year?: no  Fall with injury in past year?: no     Fall Risk Interventions:    · Home safety tips provided             Health Habits/Nutrition:     Physical Activity: Inactive    Days of Exercise per Week: 0 days    Minutes of Exercise per Session: 0 min     Have you lost any weight without trying in the past 3 months?: No  Body mass index: 22.24  Have you seen the dentist within the past year?: (!) No    Hearing/Vision:  Do you or your family notice any trouble with your hearing that hasn't been managed with hearing aids?: No  Do you have difficulty driving, watching TV, or doing any of your daily activities because of your eyesight?: No  Have you had an eye exam within the past year?: (!) No  No exam data present     ADLs:  In the past 7 days, did you need help from others to perform any of the following everyday activities: Eating, dressing, grooming, bathing, toileting, or walking/balance?: (!) Yes  Select all that apply: (!) Dressing,Bathing,Toileting,Walking/Balance  In the past 7 days, did you need help from others to take care of any of the following: Laundry, housekeeping, banking/finances, shopping, telephone use, food preparation, transportation, or taking medications?: (!) Yes  Select all that apply: (!) [place] Preparation,Banking/Finances,Transportation,Shopping  He is in wheelchair but he has 2 brothers at home who help in dressing bathing toileting walking balance part with waist belt and he has a doctor son and who helps with medical management part. Objective   Vitals:    22 1612   BP: 110/60   Site: Left Upper Arm   Position: Sitting   Cuff Size: Medium Adult   Pulse: 92   Temp: 98.6 °F (37 °C)   TempSrc: Temporal   SpO2: 98%   Weight: 173 lb 3.2 oz (78.6 kg)   Height: 6' 2\" (1.88 m)      Body mass index is 22.24 kg/m². 2022     Jessica Calderon (: 1925) is a 80 y.o. male, here for evaluation of the following medical concerns:    Chief Complaint   Patient presents with   [facility]        He has not complied with the blood work yet and we do not know how the potassium or other blood tests are Synthroid and he did follow-up with a cardiologist and he is doing fine with that and he did see the eye doctor for prescription refill and he has to see [place] doctor once a year. He also saw urologist for the catheter problem and he needs to keep follow-up with the urologist and also he is needing finasteride because of enlarged prostate. Hypertension    Watching low-sodium diet. Taking blood pressure medications regularly. Blood pressure checked off and on and trying to keep a goal of blood pressure less than 130/85 most of the time.    Denies any chest pain / palpitation / shortness of breath / lightheadedness etc.   The available labs reviewed and analyzed and independent interpretation of the results explained at length. Lab Results       Component                Value               Date                       NA                       135                 09/02/2020                 K                        3.4                 09/02/2020                 K                        3.9                 08/28/2020                 CL                       104                 09/02/2020                 CO2                      25                  09/02/2020                 BUN                      23                  09/02/2020                 CREATININE               1.1                 09/02/2020                 GLUCOSE                  123                 09/02/2020                 CALCIUM                  7.8                 09/02/2020                Hyperlipidemia    he has been watching low fat diet. Reminded to keep doing regular exercise 3 to 4 days a week. Must keep trying to lose weight.    he has been tolerating cholesterol medications without any side effects. The available labs reviewed and analyzed and independent interpretation of the results explained at length. Lab Results       Component                Value               Date                       CHOL                     148                 07/19/2017                 TRIG                     59                  07/19/2017                 HDL                      65 (H)              07/19/2017                 ALT                      165 (H)             07/19/2017                 AST                      97 (H)              07/19/2017              Gastroesophageal Reflux Disease:    Watching gastroesophageal reflux diet. Had upper scope done and Botox injection in the upper esophagus and he has been using pantoprazole twice a day and still needing Tums sometimes and  Taking medications as prescribed without any side effects. Working on trying to lose weight.   Working on trying not to eat for 3 to 4 hours before lying down. Hypothyroidism    Taking thyroid medication properly and regularly. Denies any side effects from medication. The available labs reviewed and analyzed and independent interpretation of the results explained at length. Lab Results       Component                Value               Date                       T4FREE                   1.3                 07/19/2017                 TSH                      7.90                07/19/2017                 TSHREFLEX                7.06                10/14/2013              [facility]r christian--nurse q month--urologist--1/2022--enlarged  prostate    Cardiologist --dr Hernandez [facility]--retired--see another cardiologist      Review of Systems   Constitutional: Negative for appetite change, chills, fever and unexpected weight change. HENT: Negative for congestion, ear discharge, ear pain, nosebleeds, rhinorrhea, sinus pressure, sinus pain, sore throat and trouble swallowing. Eyes: Negative for pain and discharge. Respiratory: Negative for cough, chest tightness, shortness of breath and wheezing. Cardiovascular: Negative for chest pain, palpitations and leg swelling. Gastrointestinal: Negative for abdominal pain, blood in stool, nausea and vomiting. Indigestion   Endocrine: Negative for polydipsia and polyphagia. Genitourinary: Negative for difficulty urinating, enuresis, flank pain and hematuria. Musculoskeletal: Negative for myalgias. Skin: Negative for rash. Neurological: Negative for facial asymmetry, weakness, light-headedness, numbness and headaches. Psychiatric/Behavioral: Negative for confusion.        Current Outpatient Medications on File Prior to Visit   Medication Sig Dispense Refill    docusate sodium (COLACE) 100 MG capsule Take 100 mg by mouth daily      potassium chloride (KLOR-CON) 10 MEQ extended release tablet TAKE ONE TABLET BY MOUTH DAILY 30 tablet 4    finasteride (PROSCAR) 5 MG tablet Take 1 tablet by mouth daily 30 tablet 4    torsemide (DEMADEX) 20 MG tablet Take 1 tablet by mouth daily 60 tablet 4    diclofenac sodium (VOLTAREN) 1 % GEL APPLY TWO GRAMS TO THE AFFECTED AREA OF LEFT KNEE TWO TIMES DAILY 100 g 0    levothyroxine (SYNTHROID) 50 MCG tablet Take 1 tablet by mouth Daily 30 tablet 5    B Complex Vitamins (VITAMIN B COMPLEX) TABS Take 1 tablet by mouth daily      acetaminophen (TYLENOL) 325 MG tablet Take 650 mg by mouth every 6 hours as needed for Pain      vitamin D (CHOLECALCIFEROL) 25 MCG (1000 UT) TABS tablet Take 1,000 Units by mouth daily      atorvastatin (LIPITOR) 80 MG tablet Take 80 mg by mouth nightly      lisinopril (PRINIVIL;ZESTRIL) 20 MG tablet Take 20 mg by mouth daily      pantoprazole (PROTONIX) 40 MG tablet Take 40 mg by mouth 2 times daily       aspirin 81 MG tablet Take 81 mg by mouth daily. No current facility-administered medications on file prior to visit. Past Medical History:   Diagnosis Date    Achalasia     Arthritis     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 [facility]     [facility]    CAD (coronary artery disease)     stents x 3 - dr guille Amin Cervical spondylosis     DVT (deep venous thrombosis) ([facility] [address] 2003    right leg    Hemorrhoids     Hyperlipidemia     Hypertension     PUD (peptic ulcer disease)     Sciatica     War injury due to shrapnel     leg      Social History     Tobacco Use    Smoking status: Never Smoker    Smokeless tobacco: Never Used   Substance Use Topics    Alcohol use: No      History reviewed. No pertinent family history. Vitals:    05/12/22 1612   BP: 110/60   Site: Left Upper Arm   Position: Sitting   Cuff Size: Medium Adult   Pulse: 92   Temp: 98.6 °F (37 °C)   TempSrc: Temporal   SpO2: 98%   Weight: 173 lb 3.2 oz (78.6 kg)   Height: 6' 2\" (1.88 m)     Estimated body mass index is 22.24 kg/m² as calculated from the following:    Height as of this encounter: 6' 2\" (1.88 m).     Weight as of this encounter: 173 lb 3.2 oz (78.6 kg). Physical Exam  Vitals and nursing note reviewed. Constitutional:       General: He is not in acute distress. HENT:      Head: Normocephalic and atraumatic. Right Ear: External ear normal.      Left Ear: External ear normal.   Eyes:      General: Lids are normal.      Conjunctiva/sclera: Conjunctivae normal.      Pupils: Pupils are equal, round, and reactive to light. Neck:      Thyroid: No thyromegaly. Vascular: No JVD. Trachea: No tracheal deviation. Cardiovascular:      Rate and Rhythm: Normal rate and regular rhythm. Heart sounds: Normal heart sounds. No gallop. Pulmonary:      Effort: Pulmonary effort is normal. No respiratory distress. Breath sounds: Normal breath sounds. No wheezing or rales. Abdominal:      General: Bowel sounds are normal.      Palpations: Abdomen is soft. There is no mass. Tenderness: There is no abdominal tenderness. Musculoskeletal:         General: No tenderness. Cervical back: Neck supple. Comments: No leg edema or calf tenderness   Lymphadenopathy:      Cervical: No cervical adenopathy. Skin:     General: Skin is warm and dry. Findings: No rash. Neurological:      Mental Status: He is alert and oriented to person, place, and time. Cranial Nerves: No cranial nerve deficit. Sensory: No sensory deficit. Motor: Weakness present. Gait: Gait abnormal.   Psychiatric:         Behavior: Behavior normal.         Thought Content: Thought content normal.         ASSESSMENT/PLAN:  1. Initial Medicare annual wellness visit  Yearly checkup    2. Mixed hyperlipidemia  Statin    3. Vitamin D deficiency  Vitamin D    4. Indwelling Chambers catheter present  Urologist follow-up    5. Hypokalemia  Potassium    6. Primary hypertension  Torsemide    7. Gastroesophageal reflux disease with esophagitis without hemorrhage  Strict GERD diet teaching and pantoprazole twice a day    8. Acquired hypothyroidism  Synthroid      Return in 4 months (on 9/12/2022) for blood pressure, high cholesterol. Patient Instructions   Blood test tomorrow    Extensive counseling done to keep avoiding spicy, acidic tomato based foods or fatty foods like chocolate, citrus fruits (oranges, grapefruit etc.) and fruit juices. Limit intake of coffee, tea, alcohol and Judy to one a day after food only. Watch your weight. Being overweight increases intra-abdominal pressure - which can aggravate heartburn or reflux. Don't gorge yourself at meal time. Eat smaller meals. Wait for 2 hours for exercise after eating. No eating or drinking (not even water) for 3-4 hours before lying down. May elevate head of bed with blocks , if needed. Keep urologist and cardiologist follow-up. Keep Synthroid same. Hypertension    Extensive counseling done to keep low sodium diet and  Avoid potato chips, pretzels, sauerkraut , ham , sausage, chery , salty crackers , salty french fries, salty nuts, salty popcorn etc.  Use only low sodium soups. No salted canned vegetables. Use fresh or frozen vegetables. No salt shaker use. May use Mrs. Mcfarland as a salt substitute. Avoid weight gain. Regular exercise program.  Keep taking blood pressure medications regularly. If blood pressure staying above 130/85 or having side effects with blood pressure medications, then bring the blood pressure and pulse recorded twice a day to an appointment sooner than scheduled one. Hyperlipidemia    Explained -- to keep least cheese butter or fried food. Grilled baked or broiled meats. No breaded meats. Keep trying to get to a healthy weight. Keep regular exercise program.  Keep taking cholesterol lowering medication regularly. Personalized Preventive Plan for Doc Bees - 5/12/2022  Medicare offers a range of preventive health benefits.  Some of the tests and screenings are paid in full while other may be subject to a deductible, co-insurance, and/or copay. Some of these benefits include a comprehensive review of your medical history including lifestyle, illnesses that may run in your family, and various assessments and screenings as appropriate. After reviewing your medical record and screening and assessments performed today your provider may have ordered immunizations, labs, imaging, and/or referrals for you. A list of these orders (if applicable) as well as your Preventive Care list are included within your After Visit Summary for your review. Other Preventive Recommendations:    · A preventive eye exam performed by an eye specialist is recommended every 1-2 years to screen for glaucoma; cataracts, macular degeneration, and other eye disorders. · A preventive dental visit is recommended every 6 months. · Try to get at least 150 minutes of exercise per week or 10,000 steps per day on a pedometer . · Order or download the FREE \"Exercise & Physical Activity: Your Everyday Guide\" from The [employer]. Call 1-210.832.7140 or search The [employer] online. · You need 2997-4552 mg of calcium and 7012-0168 IU of vitamin D per day. It is possible to meet your calcium requirement with diet alone, but a vitamin D supplement is usually necessary to meet this goal.  · When exposed to the sun, use a sunscreen that protects against both UVA and UVB radiation with an SPF of 30 or greater. Reapply every 2 to 3 hours or after sweating, drying off with a towel, or swimming. · Always wear a seat belt when traveling in a car. Always wear a helmet when riding a bicycle or motorcycle. Electronically signed by Hoda Dumont MD on 5/12/2022 at 5:03 PM     This dictation was generated by voice recognition computer software. Although all attempts are made to edit the dictation for accuracy, there may be errors in the transcription that are not intended.          No Known Allergies  Prior to Visit Medications Medication Sig Taking? Authorizing Provider   docusate sodium (COLACE) 100 MG capsule Take 100 mg by mouth daily Yes Historical Provider, MD   potassium chloride (KLOR-CON) 10 MEQ extended release tablet TAKE ONE TABLET BY MOUTH DAILY Yes Rhonda Rogers MD   finasteride (PROSCAR) 5 MG tablet Take 1 tablet by mouth daily Yes Rhonda Rogers MD   torsemide (DEMADEX) 20 MG tablet Take 1 tablet by mouth daily Yes Rhonda Rogers MD   diclofenac sodium (VOLTAREN) 1 % GEL APPLY TWO GRAMS TO THE AFFECTED AREA OF LEFT KNEE TWO TIMES DAILY Yes Rhonda Rogers MD   levothyroxine (SYNTHROID) 50 MCG tablet Take 1 tablet by mouth Daily Yes Rhonda Rogers MD   B Complex Vitamins (VITAMIN B COMPLEX) TABS Take 1 tablet by mouth daily Yes Historical Provider, MD   acetaminophen (TYLENOL) 325 MG tablet Take 650 mg by mouth every 6 hours as needed for Pain Yes Historical Provider, MD   vitamin D (CHOLECALCIFEROL) 25 MCG (1000 UT) TABS tablet Take 1,000 Units by mouth daily Yes Historical Provider, MD   atorvastatin (LIPITOR) 80 MG tablet Take 80 mg by mouth nightly Yes Historical Provider, MD   lisinopril (PRINIVIL;ZESTRIL) 20 MG tablet Take 20 mg by mouth daily Yes Historical Provider, MD   pantoprazole (PROTONIX) 40 MG tablet Take 40 mg by mouth 2 times daily  Yes Historical Provider, MD   aspirin 81 MG tablet Take 81 mg by mouth daily.  Yes Historical Provider, MD [place] (Including outside providers/suppliers regularly involved in providing care):   Patient Care Team:  Gianni Manuel MD as PCP - General (Internal Medicine)  Gianni Manuel MD as PCP - [facility] Empaneled Provider  Nereida Escalera MD as Consulting Physician (Cardiology)  Karen Black MD as Consulting Physician (Urology)    Reviewed and updated this visit:  Tobacco  Allergies  Meds  Problems  Med Hx  Surg Hx  Soc Hx  Fam Hx

## 2022-05-12 NOTE — PATIENT INSTRUCTIONS
Blood test tomorrow    Extensive counseling done to keep avoiding spicy, acidic tomato based foods or fatty foods like chocolate, citrus fruits (oranges, grapefruit etc.) and fruit juices. Limit intake of coffee, tea, alcohol and Judy to one a day after food only. Watch your weight. Being overweight increases intra-abdominal pressure - which can aggravate heartburn or reflux. Don't gorge yourself at meal time. Eat smaller meals. Wait for 2 hours for exercise after eating. No eating or drinking (not even water) for 3-4 hours before lying down. May elevate head of bed with blocks , if needed. Keep urologist and cardiologist follow-up. Keep Synthroid same. Hypertension    Extensive counseling done to keep low sodium diet and  Avoid potato chips, pretzels, sauerkraut , ham , sausage, chery , salty crackers , salty french fries, salty nuts, salty popcorn etc.  Use only low sodium soups. No salted canned vegetables. Use fresh or frozen vegetables. No salt shaker use. May use Mrs. Mcfarland as a salt substitute. Avoid weight gain. Regular exercise program.  Keep taking blood pressure medications regularly. If blood pressure staying above 130/85 or having side effects with blood pressure medications, then bring the blood pressure and pulse recorded twice a day to an appointment sooner than scheduled one. Hyperlipidemia    Explained -- to keep least cheese butter or fried food. Grilled baked or broiled meats. No breaded meats. Keep trying to get to a healthy weight. Keep regular exercise program.  Keep taking cholesterol lowering medication regularly. Personalized Preventive Plan for Kamilah Khoury - 5/12/2022  Medicare offers a range of preventive health benefits. Some of the tests and screenings are paid in full while other may be subject to a deductible, co-insurance, and/or copay.     Some of these benefits include a comprehensive review of your medical history including lifestyle, illnesses that may run in your family, and various assessments and screenings as appropriate. After reviewing your medical record and screening and assessments performed today your provider may have ordered immunizations, labs, imaging, and/or referrals for you. A list of these orders (if applicable) as well as your Preventive Care list are included within your After Visit Summary for your review. Other Preventive Recommendations:    · A preventive eye exam performed by an eye specialist is recommended every 1-2 years to screen for glaucoma; cataracts, macular degeneration, and other eye disorders. · A preventive dental visit is recommended every 6 months. · Try to get at least 150 minutes of exercise per week or 10,000 steps per day on a pedometer . · Order or download the FREE \"Exercise & Physical Activity: Your Everyday Guide\" from [employer]. Call 7-785.288.9054 or search [employer] online. · You need 3035-8927 mg of calcium and 4170-7973 IU of vitamin D per day. It is possible to meet your calcium requirement with diet alone, but a vitamin D supplement is usually necessary to meet this goal.  · When exposed to the sun, use a sunscreen that protects against both UVA and UVB radiation with an SPF of 30 or greater. Reapply every 2 to 3 hours or after sweating, drying off with a towel, or swimming. · Always wear a seat belt when traveling in a car. Always wear a helmet when riding a bicycle or motorcycle.

## 2022-05-13 DIAGNOSIS — D64.9 ANEMIA, UNSPECIFIED TYPE: ICD-10-CM

## 2022-05-13 DIAGNOSIS — N40.1 BPH WITH OBSTRUCTION/LOWER URINARY TRACT SYMPTOMS: ICD-10-CM

## 2022-05-13 DIAGNOSIS — R73.9 HYPERGLYCEMIA: ICD-10-CM

## 2022-05-13 DIAGNOSIS — E78.2 MIXED HYPERLIPIDEMIA: ICD-10-CM

## 2022-05-13 DIAGNOSIS — E03.9 ACQUIRED HYPOTHYROIDISM: ICD-10-CM

## 2022-05-13 DIAGNOSIS — E55.9 VITAMIN D DEFICIENCY: ICD-10-CM

## 2022-05-13 DIAGNOSIS — I10 BENIGN ESSENTIAL HTN: ICD-10-CM

## 2022-05-13 DIAGNOSIS — N13.8 BPH WITH OBSTRUCTION/LOWER URINARY TRACT SYMPTOMS: ICD-10-CM

## 2022-05-13 LAB
A/G RATIO: 1.7 (ref 1.1–2.2)
ALBUMIN SERPL-MCNC: 4 G/DL (ref 3.4–5)
ALP BLD-CCNC: 113 U/L (ref 40–129)
ALT SERPL-CCNC: 11 U/L (ref 10–40)
ANION GAP SERPL CALCULATED.3IONS-SCNC: 13 MMOL/L (ref 3–16)
ANISOCYTOSIS: ABNORMAL
AST SERPL-CCNC: 21 U/L (ref 15–37)
ATYPICAL LYMPHOCYTE RELATIVE PERCENT: 2 % (ref 0–6)
BASOPHILS ABSOLUTE: 0.2 K/UL (ref 0–0.2)
BASOPHILS RELATIVE PERCENT: 1 %
BILIRUB SERPL-MCNC: 0.5 MG/DL (ref 0–1)
BUN BLDV-MCNC: 41 MG/DL (ref 7–20)
CALCIUM SERPL-MCNC: 9.3 MG/DL (ref 8.3–10.6)
CHLORIDE BLD-SCNC: 100 MMOL/L (ref 99–110)
CHOLESTEROL, TOTAL: 159 MG/DL (ref 0–199)
CO2: 26 MMOL/L (ref 21–32)
CREAT SERPL-MCNC: 1.5 MG/DL (ref 0.8–1.3)
CRENATED RBC'S: ABNORMAL
EOSINOPHILS ABSOLUTE: 0.3 K/UL (ref 0–0.6)
EOSINOPHILS RELATIVE PERCENT: 2 %
FERRITIN: 45.2 NG/ML (ref 30–400)
FOLATE: >20 NG/ML (ref 4.78–24.2)
GFR AFRICAN AMERICAN: 52
GFR NON-AFRICAN AMERICAN: 43
GLUCOSE BLD-MCNC: 119 MG/DL (ref 70–99)
HCT VFR BLD CALC: 35 % (ref 40.5–52.5)
HDLC SERPL-MCNC: 58 MG/DL (ref 40–60)
HEMOGLOBIN: 11.7 G/DL (ref 13.5–17.5)
IRON SATURATION: 39 % (ref 20–50)
IRON: 114 UG/DL (ref 59–158)
LDL CHOLESTEROL CALCULATED: 86 MG/DL
LYMPHOCYTES ABSOLUTE: 11.7 K/UL (ref 1–5.1)
LYMPHOCYTES RELATIVE PERCENT: 72 %
MCH RBC QN AUTO: 31.6 PG (ref 26–34)
MCHC RBC AUTO-ENTMCNC: 33.4 G/DL (ref 31–36)
MCV RBC AUTO: 94.6 FL (ref 80–100)
MONOCYTES ABSOLUTE: 0.5 K/UL (ref 0–1.3)
MONOCYTES RELATIVE PERCENT: 3 %
NEUTROPHILS ABSOLUTE: 3.2 K/UL (ref 1.7–7.7)
NEUTROPHILS RELATIVE PERCENT: 20 %
OVALOCYTES: ABNORMAL
PDW BLD-RTO: 13.7 % (ref 12.4–15.4)
PLATELET # BLD: 153 K/UL (ref 135–450)
PMV BLD AUTO: 9.4 FL (ref 5–10.5)
POIKILOCYTES: ABNORMAL
POTASSIUM SERPL-SCNC: 4.4 MMOL/L (ref 3.5–5.1)
PROSTATE SPECIFIC ANTIGEN: 0.6 NG/ML (ref 0–4)
RBC # BLD: 3.7 M/UL (ref 4.2–5.9)
SODIUM BLD-SCNC: 139 MMOL/L (ref 136–145)
T4 FREE: 1.4 NG/DL (ref 0.9–1.8)
TOTAL IRON BINDING CAPACITY: 295 UG/DL (ref 260–445)
TOTAL PROTEIN: 6.3 G/DL (ref 6.4–8.2)
TRIGL SERPL-MCNC: 77 MG/DL (ref 0–150)
TSH REFLEX: 7.2 UIU/ML (ref 0.27–4.2)
VITAMIN D 25-HYDROXY: 49.3 NG/ML
VLDLC SERPL CALC-MCNC: 15 MG/DL
WBC # BLD: 15.8 K/UL (ref 4–11)

## 2022-05-16 LAB — HEMATOLOGY PATH CONSULT: NORMAL

## 2022-05-17 LAB
ESTIMATED AVERAGE GLUCOSE: 137 MG/DL
HBA1C MFR BLD: 6.4 %

## 2022-05-18 LAB — METHYLMALONIC ACID: 0.43 UMOL/L (ref 0–0.4)

## 2022-09-07 ENCOUNTER — TELEPHONE (OUTPATIENT)
Dept: INTERNAL MEDICINE CLINIC | Age: 87
End: 2022-09-07

## 2022-09-07 NOTE — TELEPHONE ENCOUNTER
Nurse said she went to check on patient and noticed he had a lot of wheezing in his bronchi and wanted to know if Dr Lauro Jansen wanted wanted to do a chest x ray     Also if he wanted to order PT to help with mobility       Please advise

## 2022-09-07 NOTE — TELEPHONE ENCOUNTER
He needs to go to the emergency room now to see if he is getting pneumonia or some other infection or congestive heart failure .

## 2023-06-03 ENCOUNTER — APPOINTMENT (OUTPATIENT)
Dept: GENERAL RADIOLOGY | Age: 88
DRG: 698 | End: 2023-06-03
Payer: MEDICARE

## 2023-06-03 ENCOUNTER — HOSPITAL ENCOUNTER (INPATIENT)
Age: 88
LOS: 4 days | Discharge: NURSING, REHAB OR LONG-TERM CARE | DRG: 698 | End: 2023-06-08
Attending: STUDENT IN AN ORGANIZED HEALTH CARE EDUCATION/TRAINING PROGRAM | Admitting: INTERNAL MEDICINE
Payer: MEDICARE

## 2023-06-03 DIAGNOSIS — Z97.8 FOLEY CATHETER IN PLACE: Primary | ICD-10-CM

## 2023-06-03 DIAGNOSIS — N17.9 AKI (ACUTE KIDNEY INJURY) (HCC): ICD-10-CM

## 2023-06-03 LAB
ANION GAP SERPL CALCULATED.3IONS-SCNC: 12 MMOL/L (ref 3–16)
BACTERIA URNS QL MICRO: ABNORMAL /HPF
BASE EXCESS BLDV CALC-SCNC: 2.3 MMOL/L (ref -2–3)
BASOPHILS # BLD: 0 K/UL (ref 0–0.2)
BASOPHILS NFR BLD: 0 %
BILIRUB UR QL STRIP.AUTO: ABNORMAL
BUN SERPL-MCNC: 46 MG/DL (ref 7–20)
CALCIUM SERPL-MCNC: 9.1 MG/DL (ref 8.3–10.6)
CHLORIDE SERPL-SCNC: 96 MMOL/L (ref 99–110)
CLARITY UR: CLEAR
CO2 BLDV-SCNC: 28 MMOL/L
CO2 SERPL-SCNC: 26 MMOL/L (ref 21–32)
COHGB MFR BLDV: 1.2 % (ref 0–1.5)
COLOR UR: YELLOW
CREAT SERPL-MCNC: 2.2 MG/DL (ref 0.8–1.3)
DEPRECATED RDW RBC AUTO: 13.8 % (ref 12.4–15.4)
DO-HGB MFR BLDV: 12.3 %
EOSINOPHIL # BLD: 0 K/UL (ref 0–0.6)
EOSINOPHIL NFR BLD: 0 %
EPI CELLS #/AREA URNS HPF: ABNORMAL /HPF (ref 0–5)
FLUAV RNA UPPER RESP QL NAA+PROBE: NEGATIVE
FLUBV AG NPH QL: NEGATIVE
GFR SERPLBLD CREATININE-BSD FMLA CKD-EPI: 26 ML/MIN/{1.73_M2}
GLUCOSE SERPL-MCNC: 165 MG/DL (ref 70–99)
GLUCOSE UR STRIP.AUTO-MCNC: 100 MG/DL
HCO3 BLDV-SCNC: 27 MMOL/L (ref 24–28)
HCT VFR BLD AUTO: 32.2 % (ref 40.5–52.5)
HGB BLD-MCNC: 10.5 G/DL (ref 13.5–17.5)
HGB UR QL STRIP.AUTO: ABNORMAL
HYALINE CASTS #/AREA URNS LPF: ABNORMAL /LPF (ref 0–2)
INR PPP: 1.11 (ref 0.84–1.16)
KETONES UR STRIP.AUTO-MCNC: 15 MG/DL
LACTATE BLDV-SCNC: 2.6 MMOL/L (ref 0.4–1.9)
LACTATE BLDV-SCNC: 3.2 MMOL/L (ref 0.4–1.9)
LEUKOCYTE ESTERASE UR QL STRIP.AUTO: ABNORMAL
LYMPHOCYTES # BLD: 12.2 K/UL (ref 1–5.1)
LYMPHOCYTES NFR BLD: 13 %
MCH RBC QN AUTO: 31 PG (ref 26–34)
MCHC RBC AUTO-ENTMCNC: 32.6 G/DL (ref 31–36)
MCV RBC AUTO: 95.1 FL (ref 80–100)
METHGB MFR BLDV: <0 % (ref 0–1.5)
MONOCYTES # BLD: 0 K/UL (ref 0–1.3)
MONOCYTES NFR BLD: 0 %
MUCOUS THREADS #/AREA URNS LPF: ABNORMAL /LPF
NEUTROPHILS # BLD: 11.2 K/UL (ref 1.7–7.7)
NEUTROPHILS NFR BLD: 48 %
NITRITE UR QL STRIP.AUTO: POSITIVE
NT-PROBNP SERPL-MCNC: 9359 PG/ML (ref 0–449)
PATH INTERP BLD-IMP: YES
PCO2 BLDV: 41.3 MMHG (ref 41–51)
PH BLDV: 7.42 [PH] (ref 7.35–7.45)
PH UR STRIP.AUTO: 6.5 [PH] (ref 5–8)
PLATELET # BLD AUTO: 134 K/UL (ref 135–450)
PMV BLD AUTO: 9.4 FL (ref 5–10.5)
PO2 BLDV: 53 MMHG (ref 25–40)
POTASSIUM SERPL-SCNC: 4 MMOL/L (ref 3.5–5.1)
PROT UR STRIP.AUTO-MCNC: >=300 MG/DL
PROTHROMBIN TIME: 14.3 SEC (ref 11.5–14.8)
RBC # BLD AUTO: 3.39 M/UL (ref 4.2–5.9)
RBC #/AREA URNS HPF: ABNORMAL /HPF (ref 0–4)
SAO2 % BLDV: 88 %
SARS-COV-2 RDRP RESP QL NAA+PROBE: NOT DETECTED
SODIUM SERPL-SCNC: 134 MMOL/L (ref 136–145)
SP GR UR STRIP.AUTO: 1.01 (ref 1–1.03)
TROPONIN, HIGH SENSITIVITY: 108 NG/L (ref 0–22)
TROPONIN, HIGH SENSITIVITY: 96 NG/L (ref 0–22)
UA COMPLETE W REFLEX CULTURE PNL UR: YES
UA DIPSTICK W REFLEX MICRO PNL UR: YES
URN SPEC COLLECT METH UR: ABNORMAL
UROBILINOGEN UR STRIP-ACNC: 4 E.U./DL
VARIANT LYMPHS NFR BLD MANUAL: 39 % (ref 0–6)
WBC # BLD AUTO: 23.4 K/UL (ref 4–11)
WBC #/AREA URNS HPF: ABNORMAL /HPF (ref 0–5)

## 2023-06-03 PROCEDURE — 84484 ASSAY OF TROPONIN QUANT: CPT

## 2023-06-03 PROCEDURE — 82803 BLOOD GASES ANY COMBINATION: CPT

## 2023-06-03 PROCEDURE — 51701 INSERT BLADDER CATHETER: CPT

## 2023-06-03 PROCEDURE — 87040 BLOOD CULTURE FOR BACTERIA: CPT

## 2023-06-03 PROCEDURE — 87635 SARS-COV-2 COVID-19 AMP PRB: CPT

## 2023-06-03 PROCEDURE — 80048 BASIC METABOLIC PNL TOTAL CA: CPT

## 2023-06-03 PROCEDURE — 87086 URINE CULTURE/COLONY COUNT: CPT

## 2023-06-03 PROCEDURE — 85025 COMPLETE CBC W/AUTO DIFF WBC: CPT

## 2023-06-03 PROCEDURE — 85610 PROTHROMBIN TIME: CPT

## 2023-06-03 PROCEDURE — 36415 COLL VENOUS BLD VENIPUNCTURE: CPT

## 2023-06-03 PROCEDURE — 93005 ELECTROCARDIOGRAM TRACING: CPT | Performed by: STUDENT IN AN ORGANIZED HEALTH CARE EDUCATION/TRAINING PROGRAM

## 2023-06-03 PROCEDURE — 2580000003 HC RX 258: Performed by: STUDENT IN AN ORGANIZED HEALTH CARE EDUCATION/TRAINING PROGRAM

## 2023-06-03 PROCEDURE — 96367 TX/PROPH/DG ADDL SEQ IV INF: CPT

## 2023-06-03 PROCEDURE — 83880 ASSAY OF NATRIURETIC PEPTIDE: CPT

## 2023-06-03 PROCEDURE — 87804 INFLUENZA ASSAY W/OPTIC: CPT

## 2023-06-03 PROCEDURE — 99285 EMERGENCY DEPT VISIT HI MDM: CPT

## 2023-06-03 PROCEDURE — 96365 THER/PROPH/DIAG IV INF INIT: CPT

## 2023-06-03 PROCEDURE — 81001 URINALYSIS AUTO W/SCOPE: CPT

## 2023-06-03 PROCEDURE — 83605 ASSAY OF LACTIC ACID: CPT

## 2023-06-03 PROCEDURE — 6360000002 HC RX W HCPCS: Performed by: STUDENT IN AN ORGANIZED HEALTH CARE EDUCATION/TRAINING PROGRAM

## 2023-06-03 PROCEDURE — 71046 X-RAY EXAM CHEST 2 VIEWS: CPT

## 2023-06-03 RX ADMIN — VANCOMYCIN HYDROCHLORIDE 1250 MG: 10 INJECTION, POWDER, LYOPHILIZED, FOR SOLUTION INTRAVENOUS at 23:07

## 2023-06-03 RX ADMIN — MEROPENEM 1000 MG: 1 INJECTION, POWDER, FOR SOLUTION INTRAVENOUS at 21:42

## 2023-06-03 ASSESSMENT — PAIN - FUNCTIONAL ASSESSMENT: PAIN_FUNCTIONAL_ASSESSMENT: NONE - DENIES PAIN

## 2023-06-04 PROBLEM — R65.20 SEVERE SEPSIS (HCC): Status: ACTIVE | Noted: 2023-06-04

## 2023-06-04 PROBLEM — A41.9 SEPSIS (HCC): Status: ACTIVE | Noted: 2023-06-04

## 2023-06-04 PROBLEM — A41.9 SEPSIS (HCC): Status: RESOLVED | Noted: 2023-06-04 | Resolved: 2023-06-04

## 2023-06-04 LAB
ALBUMIN SERPL-MCNC: 2.9 G/DL (ref 3.4–5)
AMORPH SED URNS QL MICRO: ABNORMAL /HPF
ANION GAP SERPL CALCULATED.3IONS-SCNC: 10 MMOL/L (ref 3–16)
BACTERIA URNS QL MICRO: ABNORMAL /HPF
BASOPHILS # BLD: 0 K/UL (ref 0–0.2)
BASOPHILS NFR BLD: 0 %
BILIRUB UR QL STRIP.AUTO: NEGATIVE
BUN SERPL-MCNC: 44 MG/DL (ref 7–20)
CALCIUM SERPL-MCNC: 8.7 MG/DL (ref 8.3–10.6)
CHLORIDE SERPL-SCNC: 99 MMOL/L (ref 99–110)
CLARITY UR: CLEAR
CO2 SERPL-SCNC: 24 MMOL/L (ref 21–32)
COLOR UR: YELLOW
CREAT SERPL-MCNC: 2 MG/DL (ref 0.8–1.3)
CREAT UR-MCNC: 104.7 MG/DL (ref 39–259)
DEPRECATED RDW RBC AUTO: 14.4 % (ref 12.4–15.4)
EKG ATRIAL RATE: 83 BPM
EKG DIAGNOSIS: NORMAL
EKG P AXIS: 30 DEGREES
EKG P-R INTERVAL: 288 MS
EKG Q-T INTERVAL: 372 MS
EKG QRS DURATION: 144 MS
EKG QTC CALCULATION (BAZETT): 437 MS
EKG R AXIS: -25 DEGREES
EKG T AXIS: 127 DEGREES
EKG VENTRICULAR RATE: 83 BPM
EOSINOPHIL # BLD: 0 K/UL (ref 0–0.6)
EOSINOPHIL NFR BLD: 0 %
EPI CELLS #/AREA URNS HPF: ABNORMAL /HPF (ref 0–5)
GFR SERPLBLD CREATININE-BSD FMLA CKD-EPI: 30 ML/MIN/{1.73_M2}
GLUCOSE SERPL-MCNC: 127 MG/DL (ref 70–99)
GLUCOSE UR STRIP.AUTO-MCNC: NEGATIVE MG/DL
HCT VFR BLD AUTO: 28 % (ref 40.5–52.5)
HGB BLD-MCNC: 9.2 G/DL (ref 13.5–17.5)
HGB UR QL STRIP.AUTO: ABNORMAL
KETONES UR STRIP.AUTO-MCNC: NEGATIVE MG/DL
LACTATE BLDV-SCNC: 2.1 MMOL/L (ref 0.4–2)
LEUKOCYTE ESTERASE UR QL STRIP.AUTO: ABNORMAL
LYMPHOCYTES # BLD: 13 K/UL (ref 1–5.1)
LYMPHOCYTES NFR BLD: 44 %
MAGNESIUM SERPL-MCNC: 1.4 MG/DL (ref 1.8–2.4)
MCH RBC QN AUTO: 31.3 PG (ref 26–34)
MCHC RBC AUTO-ENTMCNC: 33 G/DL (ref 31–36)
MCV RBC AUTO: 95.1 FL (ref 80–100)
METAMYELOCYTES NFR BLD MANUAL: 2 %
MONOCYTES # BLD: 0 K/UL (ref 0–1.3)
MONOCYTES NFR BLD: 0 %
MUCOUS THREADS #/AREA URNS LPF: ABNORMAL /LPF
NEUTROPHILS # BLD: 15.3 K/UL (ref 1.7–7.7)
NEUTROPHILS NFR BLD: 51 %
NEUTS BAND NFR BLD MANUAL: 1 % (ref 0–7)
NITRITE UR QL STRIP.AUTO: NEGATIVE
PH UR STRIP.AUTO: 5.5 [PH] (ref 5–8)
PHOSPHATE SERPL-MCNC: 3.6 MG/DL (ref 2.5–4.9)
PLATELET # BLD AUTO: 112 K/UL (ref 135–450)
PMV BLD AUTO: 9.3 FL (ref 5–10.5)
POTASSIUM SERPL-SCNC: 4.7 MMOL/L (ref 3.5–5.1)
PROT UR STRIP.AUTO-MCNC: 30 MG/DL
RBC # BLD AUTO: 2.94 M/UL (ref 4.2–5.9)
RBC #/AREA URNS HPF: ABNORMAL /HPF (ref 0–4)
RBC MORPH BLD: NORMAL
SODIUM SERPL-SCNC: 133 MMOL/L (ref 136–145)
SODIUM UR-SCNC: <20 MMOL/L
SP GR UR STRIP.AUTO: 1.02 (ref 1–1.03)
UA DIPSTICK W REFLEX MICRO PNL UR: YES
URN SPEC COLLECT METH UR: ABNORMAL
UROBILINOGEN UR STRIP-ACNC: 0.2 E.U./DL
VANCOMYCIN SERPL-MCNC: 10.9 UG/ML
VARIANT LYMPHS NFR BLD MANUAL: 2 % (ref 0–6)
WBC # BLD AUTO: 28.3 K/UL (ref 4–11)
WBC #/AREA URNS HPF: ABNORMAL /HPF (ref 0–5)

## 2023-06-04 PROCEDURE — 96361 HYDRATE IV INFUSION ADD-ON: CPT

## 2023-06-04 PROCEDURE — 2580000003 HC RX 258

## 2023-06-04 PROCEDURE — 1200000000 HC SEMI PRIVATE

## 2023-06-04 PROCEDURE — 87186 SC STD MICRODIL/AGAR DIL: CPT

## 2023-06-04 PROCEDURE — 87086 URINE CULTURE/COLONY COUNT: CPT

## 2023-06-04 PROCEDURE — 6370000000 HC RX 637 (ALT 250 FOR IP): Performed by: INTERNAL MEDICINE

## 2023-06-04 PROCEDURE — 84300 ASSAY OF URINE SODIUM: CPT

## 2023-06-04 PROCEDURE — 6370000000 HC RX 637 (ALT 250 FOR IP)

## 2023-06-04 PROCEDURE — 83605 ASSAY OF LACTIC ACID: CPT

## 2023-06-04 PROCEDURE — 81001 URINALYSIS AUTO W/SCOPE: CPT

## 2023-06-04 PROCEDURE — 83735 ASSAY OF MAGNESIUM: CPT

## 2023-06-04 PROCEDURE — 36415 COLL VENOUS BLD VENIPUNCTURE: CPT

## 2023-06-04 PROCEDURE — 2580000003 HC RX 258: Performed by: STUDENT IN AN ORGANIZED HEALTH CARE EDUCATION/TRAINING PROGRAM

## 2023-06-04 PROCEDURE — 96360 HYDRATION IV INFUSION INIT: CPT

## 2023-06-04 PROCEDURE — 80069 RENAL FUNCTION PANEL: CPT

## 2023-06-04 PROCEDURE — 85025 COMPLETE CBC W/AUTO DIFF WBC: CPT

## 2023-06-04 PROCEDURE — 6360000002 HC RX W HCPCS

## 2023-06-04 PROCEDURE — 80202 ASSAY OF VANCOMYCIN: CPT

## 2023-06-04 PROCEDURE — 87641 MR-STAPH DNA AMP PROBE: CPT

## 2023-06-04 PROCEDURE — 82570 ASSAY OF URINE CREATININE: CPT

## 2023-06-04 RX ORDER — LEVOTHYROXINE SODIUM 0.05 MG/1
50 TABLET ORAL DAILY
Status: DISCONTINUED | OUTPATIENT
Start: 2023-06-04 | End: 2023-06-08 | Stop reason: HOSPADM

## 2023-06-04 RX ORDER — ATORVASTATIN CALCIUM 80 MG/1
80 TABLET, FILM COATED ORAL NIGHTLY
Status: DISCONTINUED | OUTPATIENT
Start: 2023-06-04 | End: 2023-06-08 | Stop reason: HOSPADM

## 2023-06-04 RX ORDER — TORSEMIDE 20 MG/1
20 TABLET ORAL DAILY
Status: DISCONTINUED | OUTPATIENT
Start: 2023-06-04 | End: 2023-06-04

## 2023-06-04 RX ORDER — PANTOPRAZOLE SODIUM 40 MG/1
40 TABLET, DELAYED RELEASE ORAL 2 TIMES DAILY
Status: DISCONTINUED | OUTPATIENT
Start: 2023-06-04 | End: 2023-06-08 | Stop reason: HOSPADM

## 2023-06-04 RX ORDER — ONDANSETRON 4 MG/1
4 TABLET, ORALLY DISINTEGRATING ORAL EVERY 8 HOURS PRN
Status: DISCONTINUED | OUTPATIENT
Start: 2023-06-04 | End: 2023-06-08 | Stop reason: HOSPADM

## 2023-06-04 RX ORDER — SODIUM CHLORIDE, SODIUM LACTATE, POTASSIUM CHLORIDE, AND CALCIUM CHLORIDE .6; .31; .03; .02 G/100ML; G/100ML; G/100ML; G/100ML
500 INJECTION, SOLUTION INTRAVENOUS ONCE
Status: COMPLETED | OUTPATIENT
Start: 2023-06-04 | End: 2023-06-04

## 2023-06-04 RX ORDER — UBIDECARENONE 75 MG
1000 CAPSULE ORAL DAILY
COMMUNITY

## 2023-06-04 RX ORDER — ACETAMINOPHEN 325 MG/1
650 TABLET ORAL EVERY 6 HOURS PRN
Status: DISCONTINUED | OUTPATIENT
Start: 2023-06-04 | End: 2023-06-08 | Stop reason: HOSPADM

## 2023-06-04 RX ORDER — CALCIUM CARBONATE 500 MG/1
500 TABLET, CHEWABLE ORAL 3 TIMES DAILY PRN
Status: DISCONTINUED | OUTPATIENT
Start: 2023-06-04 | End: 2023-06-08 | Stop reason: HOSPADM

## 2023-06-04 RX ORDER — MAGNESIUM SULFATE IN WATER 40 MG/ML
2000 INJECTION, SOLUTION INTRAVENOUS ONCE
Status: COMPLETED | OUTPATIENT
Start: 2023-06-04 | End: 2023-06-04

## 2023-06-04 RX ORDER — LACTOBACILLUS RHAMNOSUS GG 10B CELL
1 CAPSULE ORAL DAILY
Status: DISCONTINUED | OUTPATIENT
Start: 2023-06-04 | End: 2023-06-08 | Stop reason: HOSPADM

## 2023-06-04 RX ORDER — SODIUM CHLORIDE 0.9 % (FLUSH) 0.9 %
5-40 SYRINGE (ML) INJECTION PRN
Status: DISCONTINUED | OUTPATIENT
Start: 2023-06-04 | End: 2023-06-08 | Stop reason: HOSPADM

## 2023-06-04 RX ORDER — HEPARIN SODIUM 5000 [USP'U]/ML
5000 INJECTION, SOLUTION INTRAVENOUS; SUBCUTANEOUS EVERY 8 HOURS SCHEDULED
Status: DISCONTINUED | OUTPATIENT
Start: 2023-06-04 | End: 2023-06-08 | Stop reason: HOSPADM

## 2023-06-04 RX ORDER — SODIUM CHLORIDE 9 MG/ML
INJECTION, SOLUTION INTRAVENOUS PRN
Status: DISCONTINUED | OUTPATIENT
Start: 2023-06-04 | End: 2023-06-08 | Stop reason: HOSPADM

## 2023-06-04 RX ORDER — SODIUM CHLORIDE 9 MG/ML
INJECTION, SOLUTION INTRAVENOUS CONTINUOUS
Status: DISCONTINUED | OUTPATIENT
Start: 2023-06-04 | End: 2023-06-04

## 2023-06-04 RX ORDER — SODIUM CHLORIDE 9 MG/ML
INJECTION, SOLUTION INTRAVENOUS CONTINUOUS
Status: ACTIVE | OUTPATIENT
Start: 2023-06-04 | End: 2023-06-06

## 2023-06-04 RX ORDER — LISINOPRIL 10 MG/1
10 TABLET ORAL DAILY
Status: DISCONTINUED | OUTPATIENT
Start: 2023-06-04 | End: 2023-06-04

## 2023-06-04 RX ORDER — ACETAMINOPHEN 650 MG/1
650 SUPPOSITORY RECTAL EVERY 6 HOURS PRN
Status: DISCONTINUED | OUTPATIENT
Start: 2023-06-04 | End: 2023-06-08 | Stop reason: HOSPADM

## 2023-06-04 RX ORDER — SODIUM CHLORIDE 0.9 % (FLUSH) 0.9 %
5-40 SYRINGE (ML) INJECTION EVERY 12 HOURS SCHEDULED
Status: DISCONTINUED | OUTPATIENT
Start: 2023-06-04 | End: 2023-06-08 | Stop reason: HOSPADM

## 2023-06-04 RX ORDER — DOCUSATE SODIUM 100 MG/1
100 CAPSULE, LIQUID FILLED ORAL DAILY
Status: DISCONTINUED | OUTPATIENT
Start: 2023-06-04 | End: 2023-06-08 | Stop reason: HOSPADM

## 2023-06-04 RX ORDER — FINASTERIDE 5 MG/1
5 TABLET, FILM COATED ORAL DAILY
Status: DISCONTINUED | OUTPATIENT
Start: 2023-06-04 | End: 2023-06-08 | Stop reason: HOSPADM

## 2023-06-04 RX ORDER — POLYETHYLENE GLYCOL 3350 17 G/17G
17 POWDER, FOR SOLUTION ORAL DAILY PRN
Status: DISCONTINUED | OUTPATIENT
Start: 2023-06-04 | End: 2023-06-08 | Stop reason: HOSPADM

## 2023-06-04 RX ORDER — SODIUM CHLORIDE, SODIUM LACTATE, POTASSIUM CHLORIDE, AND CALCIUM CHLORIDE .6; .31; .03; .02 G/100ML; G/100ML; G/100ML; G/100ML
1500 INJECTION, SOLUTION INTRAVENOUS ONCE
Status: COMPLETED | OUTPATIENT
Start: 2023-06-04 | End: 2023-06-04

## 2023-06-04 RX ORDER — ONDANSETRON 2 MG/ML
4 INJECTION INTRAMUSCULAR; INTRAVENOUS EVERY 6 HOURS PRN
Status: DISCONTINUED | OUTPATIENT
Start: 2023-06-04 | End: 2023-06-08 | Stop reason: HOSPADM

## 2023-06-04 RX ORDER — ASPIRIN 81 MG/1
81 TABLET ORAL DAILY
Status: DISCONTINUED | OUTPATIENT
Start: 2023-06-04 | End: 2023-06-08 | Stop reason: HOSPADM

## 2023-06-04 RX ADMIN — SODIUM CHLORIDE, PRESERVATIVE FREE 10 ML: 5 INJECTION INTRAVENOUS at 17:05

## 2023-06-04 RX ADMIN — MAGNESIUM SULFATE HEPTAHYDRATE 2000 MG: 40 INJECTION, SOLUTION INTRAVENOUS at 10:42

## 2023-06-04 RX ADMIN — MEROPENEM 500 MG: 500 INJECTION, POWDER, FOR SOLUTION INTRAVENOUS at 13:34

## 2023-06-04 RX ADMIN — SODIUM CHLORIDE, POTASSIUM CHLORIDE, SODIUM LACTATE AND CALCIUM CHLORIDE 1500 ML: 600; 310; 30; 20 INJECTION, SOLUTION INTRAVENOUS at 03:29

## 2023-06-04 RX ADMIN — Medication 1 CAPSULE: at 20:25

## 2023-06-04 RX ADMIN — SODIUM CHLORIDE: 9 INJECTION, SOLUTION INTRAVENOUS at 10:40

## 2023-06-04 RX ADMIN — ANTACID TABLETS 500 MG: 500 TABLET, CHEWABLE ORAL at 23:13

## 2023-06-04 RX ADMIN — ANTACID TABLETS 500 MG: 500 TABLET, CHEWABLE ORAL at 20:26

## 2023-06-04 RX ADMIN — ONDANSETRON 4 MG: 2 INJECTION INTRAMUSCULAR; INTRAVENOUS at 17:04

## 2023-06-04 RX ADMIN — DOCUSATE SODIUM 100 MG: 100 CAPSULE, LIQUID FILLED ORAL at 17:04

## 2023-06-04 RX ADMIN — HEPARIN SODIUM 5000 UNITS: 5000 INJECTION INTRAVENOUS; SUBCUTANEOUS at 13:49

## 2023-06-04 RX ADMIN — ASPIRIN 81 MG: 81 TABLET, COATED ORAL at 17:04

## 2023-06-04 RX ADMIN — ATORVASTATIN CALCIUM 80 MG: 80 TABLET, FILM COATED ORAL at 20:26

## 2023-06-04 RX ADMIN — HEPARIN SODIUM 5000 UNITS: 5000 INJECTION INTRAVENOUS; SUBCUTANEOUS at 05:31

## 2023-06-04 RX ADMIN — PANTOPRAZOLE SODIUM 40 MG: 40 TABLET, DELAYED RELEASE ORAL at 20:26

## 2023-06-04 RX ADMIN — LEVOTHYROXINE SODIUM 50 MCG: 50 TABLET ORAL at 17:05

## 2023-06-04 RX ADMIN — MEROPENEM 500 MG: 500 INJECTION, POWDER, FOR SOLUTION INTRAVENOUS at 23:09

## 2023-06-04 RX ADMIN — VANCOMYCIN HYDROCHLORIDE 1250 MG: 10 INJECTION, POWDER, LYOPHILIZED, FOR SOLUTION INTRAVENOUS at 17:13

## 2023-06-04 RX ADMIN — PANTOPRAZOLE SODIUM 40 MG: 40 TABLET, DELAYED RELEASE ORAL at 17:05

## 2023-06-04 RX ADMIN — SODIUM CHLORIDE, PRESERVATIVE FREE 10 ML: 5 INJECTION INTRAVENOUS at 20:29

## 2023-06-04 RX ADMIN — FINASTERIDE 5 MG: 5 TABLET, FILM COATED ORAL at 17:05

## 2023-06-04 RX ADMIN — SODIUM CHLORIDE, POTASSIUM CHLORIDE, SODIUM LACTATE AND CALCIUM CHLORIDE 500 ML: 600; 310; 30; 20 INJECTION, SOLUTION INTRAVENOUS at 00:09

## 2023-06-04 RX ADMIN — HEPARIN SODIUM 5000 UNITS: 5000 INJECTION INTRAVENOUS; SUBCUTANEOUS at 23:06

## 2023-06-04 RX ADMIN — SODIUM CHLORIDE, SODIUM LACTATE, POTASSIUM CHLORIDE, AND CALCIUM CHLORIDE 500 ML: .6; .31; .03; .02 INJECTION, SOLUTION INTRAVENOUS at 01:06

## 2023-06-04 ASSESSMENT — PAIN SCALES - WONG BAKER
WONGBAKER_NUMERICALRESPONSE: 0

## 2023-06-05 LAB
ALBUMIN SERPL-MCNC: 2.6 G/DL (ref 3.4–5)
ALBUMIN SERPL-MCNC: 2.9 G/DL (ref 3.4–5)
ANION GAP SERPL CALCULATED.3IONS-SCNC: 12 MMOL/L (ref 3–16)
ANION GAP SERPL CALCULATED.3IONS-SCNC: 8 MMOL/L (ref 3–16)
BASOPHILS # BLD: 0.2 K/UL (ref 0–0.2)
BASOPHILS NFR BLD: 1 %
BUN SERPL-MCNC: 39 MG/DL (ref 7–20)
BUN SERPL-MCNC: 43 MG/DL (ref 7–20)
CALCIUM SERPL-MCNC: 8.3 MG/DL (ref 8.3–10.6)
CALCIUM SERPL-MCNC: 8.6 MG/DL (ref 8.3–10.6)
CHLORIDE SERPL-SCNC: 104 MMOL/L (ref 99–110)
CHLORIDE SERPL-SCNC: 104 MMOL/L (ref 99–110)
CO2 SERPL-SCNC: 22 MMOL/L (ref 21–32)
CO2 SERPL-SCNC: 26 MMOL/L (ref 21–32)
CREAT SERPL-MCNC: 1.5 MG/DL (ref 0.8–1.3)
CREAT SERPL-MCNC: 1.6 MG/DL (ref 0.8–1.3)
DEPRECATED RDW RBC AUTO: 14.1 % (ref 12.4–15.4)
EOSINOPHIL # BLD: 0 K/UL (ref 0–0.6)
EOSINOPHIL NFR BLD: 0 %
GFR SERPLBLD CREATININE-BSD FMLA CKD-EPI: 39 ML/MIN/{1.73_M2}
GFR SERPLBLD CREATININE-BSD FMLA CKD-EPI: 42 ML/MIN/{1.73_M2}
GLUCOSE BLD-MCNC: 85 MG/DL (ref 70–99)
GLUCOSE SERPL-MCNC: 107 MG/DL (ref 70–99)
GLUCOSE SERPL-MCNC: 86 MG/DL (ref 70–99)
HCT VFR BLD AUTO: 25.1 % (ref 40.5–52.5)
HGB BLD-MCNC: 8.5 G/DL (ref 13.5–17.5)
LYMPHOCYTES # BLD: 12.8 K/UL (ref 1–5.1)
LYMPHOCYTES NFR BLD: 55 %
MAGNESIUM SERPL-MCNC: 1.7 MG/DL (ref 1.8–2.4)
MAGNESIUM SERPL-MCNC: 2.3 MG/DL (ref 1.8–2.4)
MCH RBC QN AUTO: 32 PG (ref 26–34)
MCHC RBC AUTO-ENTMCNC: 33.9 G/DL (ref 31–36)
MCV RBC AUTO: 94.5 FL (ref 80–100)
MONOCYTES # BLD: 0 K/UL (ref 0–1.3)
MONOCYTES NFR BLD: 0 %
MRSA DNA SPEC QL NAA+PROBE: NORMAL
NEUTROPHILS # BLD: 10.2 K/UL (ref 1.7–7.7)
NEUTROPHILS NFR BLD: 44 %
OVALOCYTES BLD QL SMEAR: ABNORMAL
PATH INTERP BLD-IMP: NORMAL
PERFORMED ON: NORMAL
PHOSPHATE SERPL-MCNC: 3 MG/DL (ref 2.5–4.9)
PHOSPHATE SERPL-MCNC: 3.2 MG/DL (ref 2.5–4.9)
PLATELET # BLD AUTO: 103 K/UL (ref 135–450)
PMV BLD AUTO: 9.6 FL (ref 5–10.5)
POLYCHROMASIA BLD QL SMEAR: ABNORMAL
POTASSIUM SERPL-SCNC: 4 MMOL/L (ref 3.5–5.1)
POTASSIUM SERPL-SCNC: 4.3 MMOL/L (ref 3.5–5.1)
PROCALCITONIN SERPL IA-MCNC: 53.16 NG/ML (ref 0–0.15)
RBC # BLD AUTO: 2.66 M/UL (ref 4.2–5.9)
SCHISTOCYTES BLD QL SMEAR: ABNORMAL
SODIUM SERPL-SCNC: 138 MMOL/L (ref 136–145)
SODIUM SERPL-SCNC: 138 MMOL/L (ref 136–145)
VANCOMYCIN SERPL-MCNC: 12.6 UG/ML
WBC # BLD AUTO: 23.2 K/UL (ref 4–11)

## 2023-06-05 PROCEDURE — 97167 OT EVAL HIGH COMPLEX 60 MIN: CPT

## 2023-06-05 PROCEDURE — 80069 RENAL FUNCTION PANEL: CPT

## 2023-06-05 PROCEDURE — 97162 PT EVAL MOD COMPLEX 30 MIN: CPT

## 2023-06-05 PROCEDURE — 97530 THERAPEUTIC ACTIVITIES: CPT

## 2023-06-05 PROCEDURE — 92526 ORAL FUNCTION THERAPY: CPT

## 2023-06-05 PROCEDURE — 36415 COLL VENOUS BLD VENIPUNCTURE: CPT

## 2023-06-05 PROCEDURE — 85025 COMPLETE CBC W/AUTO DIFF WBC: CPT

## 2023-06-05 PROCEDURE — 6370000000 HC RX 637 (ALT 250 FOR IP)

## 2023-06-05 PROCEDURE — 84145 PROCALCITONIN (PCT): CPT

## 2023-06-05 PROCEDURE — 6360000002 HC RX W HCPCS

## 2023-06-05 PROCEDURE — 80202 ASSAY OF VANCOMYCIN: CPT

## 2023-06-05 PROCEDURE — 97116 GAIT TRAINING THERAPY: CPT

## 2023-06-05 PROCEDURE — 2580000003 HC RX 258: Performed by: INTERNAL MEDICINE

## 2023-06-05 PROCEDURE — 97535 SELF CARE MNGMENT TRAINING: CPT

## 2023-06-05 PROCEDURE — 92610 EVALUATE SWALLOWING FUNCTION: CPT

## 2023-06-05 PROCEDURE — 6370000000 HC RX 637 (ALT 250 FOR IP): Performed by: INTERNAL MEDICINE

## 2023-06-05 PROCEDURE — 83735 ASSAY OF MAGNESIUM: CPT

## 2023-06-05 PROCEDURE — 2580000003 HC RX 258

## 2023-06-05 PROCEDURE — 6360000002 HC RX W HCPCS: Performed by: INTERNAL MEDICINE

## 2023-06-05 PROCEDURE — 1200000000 HC SEMI PRIVATE

## 2023-06-05 PROCEDURE — 99222 1ST HOSP IP/OBS MODERATE 55: CPT | Performed by: INTERNAL MEDICINE

## 2023-06-05 RX ORDER — MAGNESIUM SULFATE IN WATER 40 MG/ML
2000 INJECTION, SOLUTION INTRAVENOUS ONCE
Status: COMPLETED | OUTPATIENT
Start: 2023-06-05 | End: 2023-06-05

## 2023-06-05 RX ADMIN — SODIUM CHLORIDE: 9 INJECTION, SOLUTION INTRAVENOUS at 09:00

## 2023-06-05 RX ADMIN — Medication 1 CAPSULE: at 09:04

## 2023-06-05 RX ADMIN — HEPARIN SODIUM 5000 UNITS: 5000 INJECTION INTRAVENOUS; SUBCUTANEOUS at 15:08

## 2023-06-05 RX ADMIN — PIPERACILLIN SODIUM AND TAZOBACTAM SODIUM 4500 MG: 4; .5 INJECTION, POWDER, LYOPHILIZED, FOR SOLUTION INTRAVENOUS at 16:37

## 2023-06-05 RX ADMIN — PIPERACILLIN AND TAZOBACTAM 3375 MG: 3; .375 INJECTION, POWDER, LYOPHILIZED, FOR SOLUTION INTRAVENOUS at 21:42

## 2023-06-05 RX ADMIN — VANCOMYCIN HYDROCHLORIDE 1250 MG: 10 INJECTION, POWDER, LYOPHILIZED, FOR SOLUTION INTRAVENOUS at 11:35

## 2023-06-05 RX ADMIN — MEROPENEM 1000 MG: 1 INJECTION, POWDER, FOR SOLUTION INTRAVENOUS at 11:27

## 2023-06-05 RX ADMIN — ANTACID TABLETS 500 MG: 500 TABLET, CHEWABLE ORAL at 16:12

## 2023-06-05 RX ADMIN — ASPIRIN 81 MG: 81 TABLET, COATED ORAL at 09:04

## 2023-06-05 RX ADMIN — SODIUM CHLORIDE 25 ML: 9 INJECTION, SOLUTION INTRAVENOUS at 21:40

## 2023-06-05 RX ADMIN — HEPARIN SODIUM 5000 UNITS: 5000 INJECTION INTRAVENOUS; SUBCUTANEOUS at 21:32

## 2023-06-05 RX ADMIN — PANTOPRAZOLE SODIUM 40 MG: 40 TABLET, DELAYED RELEASE ORAL at 09:04

## 2023-06-05 RX ADMIN — MAGNESIUM SULFATE HEPTAHYDRATE 2000 MG: 40 INJECTION, SOLUTION INTRAVENOUS at 09:05

## 2023-06-05 RX ADMIN — HEPARIN SODIUM 5000 UNITS: 5000 INJECTION INTRAVENOUS; SUBCUTANEOUS at 05:39

## 2023-06-05 RX ADMIN — ATORVASTATIN CALCIUM 80 MG: 80 TABLET, FILM COATED ORAL at 21:32

## 2023-06-05 RX ADMIN — FINASTERIDE 5 MG: 5 TABLET, FILM COATED ORAL at 09:04

## 2023-06-05 RX ADMIN — DOCUSATE SODIUM 100 MG: 100 CAPSULE, LIQUID FILLED ORAL at 09:04

## 2023-06-05 RX ADMIN — LEVOTHYROXINE SODIUM 50 MCG: 50 TABLET ORAL at 05:39

## 2023-06-05 RX ADMIN — PANTOPRAZOLE SODIUM 40 MG: 40 TABLET, DELAYED RELEASE ORAL at 21:32

## 2023-06-05 ASSESSMENT — PAIN SCALES - WONG BAKER
WONGBAKER_NUMERICALRESPONSE: 0

## 2023-06-05 NOTE — CONSULTS
06/05/2023 04:31 AM    BUN 43 06/05/2023 04:31 AM    LABALBU 2.6 06/05/2023 04:31 AM    CREATININE 1.6 06/05/2023 04:31 AM    CALCIUM 8.3 06/05/2023 04:31 AM    GFRAA 52 05/13/2022 02:41 PM    GFRAA >60 06/08/2012 12:41 PM    LABGLOM 39 06/05/2023 04:31 AM     PT/INR:    Lab Results   Component Value Date/Time    PROTIME 14.3 06/03/2023 09:37 PM    INR 1.11 06/03/2023 09:37 PM     PTT:  No results found for: APTT[APTT    Urine Culture: Pending    Antibiotic Therapy: Esdras Daley    Impression/Plan: 79 yo M admitted with fevers/hematuria due to malpositioned gonzales after exchange at nursing home.    -No distress, urine clear yellow  -WBC and Cr improving  -Afebrile overnight. Await cultures, continue IV abx  -Needs monthly exchanges with coude catheters in the future to avoid prostate trauma.  Can hold off on further imaging at this time as urine is completely clear    MARTÍN Cabrera
mL, IntraVENous, PRN  0.9 % sodium chloride infusion, , IntraVENous, PRN  ondansetron (ZOFRAN-ODT) disintegrating tablet 4 mg, 4 mg, Oral, Q8H PRN **OR** ondansetron (ZOFRAN) injection 4 mg, 4 mg, IntraVENous, Q6H PRN  polyethylene glycol (GLYCOLAX) packet 17 g, 17 g, Oral, Daily PRN  acetaminophen (TYLENOL) tablet 650 mg, 650 mg, Oral, Q6H PRN **OR** acetaminophen (TYLENOL) suppository 650 mg, 650 mg, Rectal, Q6H PRN  heparin (porcine) injection 5,000 Units, 5,000 Units, SubCUTAneous, 3 times per day  0.9 % sodium chloride infusion, , IntraVENous, Continuous  lactobacillus (CULTURELLE) capsule 1 capsule, 1 capsule, Oral, Daily  calcium carbonate (TUMS) chewable tablet 500 mg, 500 mg, Oral, TID PRN    No Known Allergies     REVIEW OF SYSTEMS:    Limited due to pt condition, denies any fevers and states had some SP tenderness, now resolved. Objective:   PHYSICAL EXAM:      VITALS:  BP (!) 121/39   Pulse 63   Temp 98.8 °F (37.1 °C) (Oral)   Resp 18   Ht 6' 2.02\" (1.88 m)   Wt 189 lb 13.1 oz (86.1 kg)   SpO2 94%   BMI 24.36 kg/m²      24HR INTAKE/OUTPUT:    Intake/Output Summary (Last 24 hours) at 6/5/2023 1553  Last data filed at 6/5/2023 1000  Gross per 24 hour   Intake 1480.61 ml   Output 1300 ml   Net 180.61 ml     CONSTITUTIONAL:  Awake, alert, cooperative, no apparent distress, and appears stated age  [de-identified]: NCAT, PERRL, EOMI. Sclera white, conjunctive full. OP with moist mucosal membranes, no thrush, tongue protrudes midline  NECK:  Supple, symmetrical, trachea midline, no adenopathy  LUNGS:  no increased work of breathing and clear to auscultation. No accessory muscle use  CARDIOVASCULAR: S1 and S2, no murmur  ABDOMEN:  normal bowel sounds, mild SP tenderness, non-distended, no hepatosplenomegaly  LYMPHADENOPATHY:  no axillary or supraclavicular adenopathy. No cervical adnenopathy  PSYCHIATRIC: Oriented to person place and time. No obvious depression or anxiety.   MUSCULOSKELETAL: No obvious

## 2023-06-05 NOTE — PLAN OF CARE
Problem: Discharge Planning  Goal: Discharge to home or other facility with appropriate resources  Outcome: Progressing     Problem: Safety - Adult  Goal: Free from fall injury  Outcome: Progressing  Note: No falls noted thus far this shift, bed in lowest position, alarm on, non-skid socks on, call light within reach, hourly checks, safety maintained, will continue to monitor.       Problem: Pain  Goal: Verbalizes/displays adequate comfort level or baseline comfort level  Outcome: Progressing     Problem: Chronic Conditions and Co-morbidities  Goal: Patient's chronic conditions and co-morbidity symptoms are monitored and maintained or improved  Outcome: Progressing

## 2023-06-05 NOTE — PLAN OF CARE
Intervention: Swallow Evaluation/treatment  SLP completed evaluation. Please refer to notes in EMR.     Electronically signed by:  Jahaira Rodriguez M.A., [address] [place] [address]  Speech-Language Pathologist  Pg #: [phone]

## 2023-06-06 ENCOUNTER — APPOINTMENT (OUTPATIENT)
Dept: GENERAL RADIOLOGY | Age: 88
DRG: 698 | End: 2023-06-06
Payer: MEDICARE

## 2023-06-06 LAB
ALBUMIN SERPL-MCNC: 2.6 G/DL (ref 3.4–5)
ANION GAP SERPL CALCULATED.3IONS-SCNC: 7 MMOL/L (ref 3–16)
BASOPHILS # BLD: 0 K/UL (ref 0–0.2)
BASOPHILS NFR BLD: 0 %
BUN SERPL-MCNC: 32 MG/DL (ref 7–20)
CALCIUM SERPL-MCNC: 8.1 MG/DL (ref 8.3–10.6)
CHLORIDE SERPL-SCNC: 107 MMOL/L (ref 99–110)
CO2 SERPL-SCNC: 25 MMOL/L (ref 21–32)
CREAT SERPL-MCNC: 1.4 MG/DL (ref 0.8–1.3)
DEPRECATED RDW RBC AUTO: 13.8 % (ref 12.4–15.4)
EOSINOPHIL # BLD: 0.2 K/UL (ref 0–0.6)
EOSINOPHIL NFR BLD: 1 %
GFR SERPLBLD CREATININE-BSD FMLA CKD-EPI: 45 ML/MIN/{1.73_M2}
GLUCOSE SERPL-MCNC: 90 MG/DL (ref 70–99)
HCT VFR BLD AUTO: 28.5 % (ref 40.5–52.5)
HGB BLD-MCNC: 9.5 G/DL (ref 13.5–17.5)
LYMPHOCYTES # BLD: 9 K/UL (ref 1–5.1)
LYMPHOCYTES NFR BLD: 59 %
MAGNESIUM SERPL-MCNC: 2.1 MG/DL (ref 1.8–2.4)
MCH RBC QN AUTO: 31.8 PG (ref 26–34)
MCHC RBC AUTO-ENTMCNC: 33.2 G/DL (ref 31–36)
MCV RBC AUTO: 95.6 FL (ref 80–100)
MONOCYTES # BLD: 0 K/UL (ref 0–1.3)
MONOCYTES NFR BLD: 0 %
NEUTROPHILS # BLD: 6.1 K/UL (ref 1.7–7.7)
NEUTROPHILS NFR BLD: 40 %
PHOSPHATE SERPL-MCNC: 2.9 MG/DL (ref 2.5–4.9)
PLATELET # BLD AUTO: 122 K/UL (ref 135–450)
PMV BLD AUTO: 9.8 FL (ref 5–10.5)
POTASSIUM SERPL-SCNC: 4 MMOL/L (ref 3.5–5.1)
RBC # BLD AUTO: 2.98 M/UL (ref 4.2–5.9)
RBC MORPH BLD: NORMAL
SODIUM SERPL-SCNC: 139 MMOL/L (ref 136–145)
WBC # BLD AUTO: 15.2 K/UL (ref 4–11)

## 2023-06-06 PROCEDURE — 97530 THERAPEUTIC ACTIVITIES: CPT

## 2023-06-06 PROCEDURE — 83735 ASSAY OF MAGNESIUM: CPT

## 2023-06-06 PROCEDURE — 6370000000 HC RX 637 (ALT 250 FOR IP): Performed by: INTERNAL MEDICINE

## 2023-06-06 PROCEDURE — 80069 RENAL FUNCTION PANEL: CPT

## 2023-06-06 PROCEDURE — 2580000003 HC RX 258: Performed by: INTERNAL MEDICINE

## 2023-06-06 PROCEDURE — 85025 COMPLETE CBC W/AUTO DIFF WBC: CPT

## 2023-06-06 PROCEDURE — 97110 THERAPEUTIC EXERCISES: CPT

## 2023-06-06 PROCEDURE — 6360000002 HC RX W HCPCS: Performed by: INTERNAL MEDICINE

## 2023-06-06 PROCEDURE — 74230 X-RAY XM SWLNG FUNCJ C+: CPT

## 2023-06-06 PROCEDURE — 97535 SELF CARE MNGMENT TRAINING: CPT

## 2023-06-06 PROCEDURE — 6370000000 HC RX 637 (ALT 250 FOR IP)

## 2023-06-06 PROCEDURE — 97116 GAIT TRAINING THERAPY: CPT

## 2023-06-06 PROCEDURE — 1200000000 HC SEMI PRIVATE

## 2023-06-06 PROCEDURE — 99232 SBSQ HOSP IP/OBS MODERATE 35: CPT | Performed by: INTERNAL MEDICINE

## 2023-06-06 PROCEDURE — 2580000003 HC RX 258

## 2023-06-06 PROCEDURE — 36415 COLL VENOUS BLD VENIPUNCTURE: CPT

## 2023-06-06 PROCEDURE — 92611 MOTION FLUOROSCOPY/SWALLOW: CPT

## 2023-06-06 PROCEDURE — 6360000002 HC RX W HCPCS

## 2023-06-06 PROCEDURE — 92526 ORAL FUNCTION THERAPY: CPT

## 2023-06-06 RX ADMIN — DOCUSATE SODIUM 100 MG: 100 CAPSULE, LIQUID FILLED ORAL at 09:12

## 2023-06-06 RX ADMIN — FINASTERIDE 5 MG: 5 TABLET, FILM COATED ORAL at 09:12

## 2023-06-06 RX ADMIN — ATORVASTATIN CALCIUM 80 MG: 80 TABLET, FILM COATED ORAL at 21:35

## 2023-06-06 RX ADMIN — PIPERACILLIN AND TAZOBACTAM 3375 MG: 3; .375 INJECTION, POWDER, LYOPHILIZED, FOR SOLUTION INTRAVENOUS at 14:02

## 2023-06-06 RX ADMIN — HEPARIN SODIUM 5000 UNITS: 5000 INJECTION INTRAVENOUS; SUBCUTANEOUS at 21:36

## 2023-06-06 RX ADMIN — SODIUM CHLORIDE 25 ML: 9 INJECTION, SOLUTION INTRAVENOUS at 21:54

## 2023-06-06 RX ADMIN — HEPARIN SODIUM 5000 UNITS: 5000 INJECTION INTRAVENOUS; SUBCUTANEOUS at 05:52

## 2023-06-06 RX ADMIN — LEVOTHYROXINE SODIUM 50 MCG: 50 TABLET ORAL at 05:52

## 2023-06-06 RX ADMIN — PIPERACILLIN AND TAZOBACTAM 3375 MG: 3; .375 INJECTION, POWDER, LYOPHILIZED, FOR SOLUTION INTRAVENOUS at 21:57

## 2023-06-06 RX ADMIN — PANTOPRAZOLE SODIUM 40 MG: 40 TABLET, DELAYED RELEASE ORAL at 09:12

## 2023-06-06 RX ADMIN — HEPARIN SODIUM 5000 UNITS: 5000 INJECTION INTRAVENOUS; SUBCUTANEOUS at 14:02

## 2023-06-06 RX ADMIN — ACETAMINOPHEN 650 MG: 325 TABLET ORAL at 21:35

## 2023-06-06 RX ADMIN — Medication 1 CAPSULE: at 09:12

## 2023-06-06 RX ADMIN — ANTACID TABLETS 500 MG: 500 TABLET, CHEWABLE ORAL at 21:35

## 2023-06-06 RX ADMIN — ACETAMINOPHEN 650 MG: 325 TABLET ORAL at 00:24

## 2023-06-06 RX ADMIN — ANTACID TABLETS 500 MG: 500 TABLET, CHEWABLE ORAL at 00:24

## 2023-06-06 RX ADMIN — SODIUM CHLORIDE 25 ML: 9 INJECTION, SOLUTION INTRAVENOUS at 05:53

## 2023-06-06 RX ADMIN — SODIUM CHLORIDE: 9 INJECTION, SOLUTION INTRAVENOUS at 12:28

## 2023-06-06 RX ADMIN — PIPERACILLIN AND TAZOBACTAM 3375 MG: 3; .375 INJECTION, POWDER, LYOPHILIZED, FOR SOLUTION INTRAVENOUS at 05:55

## 2023-06-06 RX ADMIN — PANTOPRAZOLE SODIUM 40 MG: 40 TABLET, DELAYED RELEASE ORAL at 21:35

## 2023-06-06 RX ADMIN — ASPIRIN 81 MG: 81 TABLET, COATED ORAL at 09:12

## 2023-06-06 RX ADMIN — ANTACID TABLETS 500 MG: 500 TABLET, CHEWABLE ORAL at 12:26

## 2023-06-06 ASSESSMENT — PAIN - FUNCTIONAL ASSESSMENT
PAIN_FUNCTIONAL_ASSESSMENT: ACTIVITIES ARE NOT PREVENTED

## 2023-06-06 ASSESSMENT — PAIN DESCRIPTION - FREQUENCY
FREQUENCY: CONTINUOUS

## 2023-06-06 ASSESSMENT — PAIN DESCRIPTION - ONSET
ONSET: ON-GOING

## 2023-06-06 ASSESSMENT — PAIN SCALES - GENERAL
PAINLEVEL_OUTOF10: 3
PAINLEVEL_OUTOF10: 3
PAINLEVEL_OUTOF10: 0
PAINLEVEL_OUTOF10: 3

## 2023-06-06 ASSESSMENT — PAIN DESCRIPTION - DESCRIPTORS
DESCRIPTORS: ACHING
DESCRIPTORS: ACHING
DESCRIPTORS: DISCOMFORT

## 2023-06-06 ASSESSMENT — PAIN DESCRIPTION - LOCATION
LOCATION: GROIN
LOCATION: HEAD
LOCATION: HEAD

## 2023-06-06 ASSESSMENT — PAIN DESCRIPTION - PAIN TYPE
TYPE: ACUTE PAIN

## 2023-06-06 ASSESSMENT — PAIN DESCRIPTION - ORIENTATION
ORIENTATION: ANTERIOR
ORIENTATION: MID
ORIENTATION: MID

## 2023-06-06 NOTE — PROCEDURES
issue.      Treatment Dx and ICD 10: dysphagia   Patient Position: Lateral and upright    Consistencies Administered: Pureed; Thin cup; Thin teaspoon; Thin straw;Mildly Thick cup;Mildly Thick teaspoon;Mildly Thick straw    Dysphagia Outcome Severity Scale: Level 4: Mild moderate dysphagia- Intermittent supervision/cueing. One - two diet consistencies restricted  Penetration-Aspiration Scale (PAS): 7 - Material enters the airway, passes below the vocal folds, and is not ejected from the trachea despite effort    Recommendations/Treatment  Requires SLP Intervention: Yes  Recommendations: GI Eval  Recommendations comment: GI - esophageal reflux/backflow into pharynx  D/C Recommendations: To be determined    Referral To: GI    Education: Images and recommendations were reviewed with the patient following this exam.     Safety Devices  Safety Devices in place: Yes  Type of devices: All fall risk precautions in place (handed off to transporter)  Restraints Initially in Place: No      Goals:    Goal 1: Pt will participate in repeat MBS, as agreeable per pt.   6/6: Planned for today, per patient preference. Goal 2: Pt will tolerate least restrictive diet with no respiratory decline. 6/6: Per d/w RN, she has concerns with pt's diet tolerance, reporting pt coughing/choking with meds this am. Bedside, pt seen awake, alert, wet vocal quality. Repositioned pt upright and assisted with upper partial placement. Assessed tolerance thins via straw and puree; positive oral acceptance/containment/clearance, no overt s/s aspiration, but ongoing wet vocal quality/throat clearing. Discussed current swallow function/yesterday's recommendations with pt; at this time he wishes to participate in [address] See goal 3 below for details. Goal 3: Pt/caregiver will demonstrate comprehension of swallow recommendations. 6/5: SLP re-discussed rationale for evaluation, results of previous MBS and rec's for repeat.  Son present and informed SLP that

## 2023-06-06 NOTE — PLAN OF CARE
Problem: Discharge Planning  Goal: Discharge to home or other facility with appropriate resources  6/6/2023 0611 by Trinidad Kaur RN  Outcome: Progressing  Flowsheets (Taken 6/6/2023 9613)  Discharge to home or other facility with appropriate resources:   Identify barriers to discharge with patient and caregiver   Arrange for needed discharge resources and transportation as appropriate   Identify discharge learning needs (meds, wound care, etc)     Problem: Safety - Adult  Goal: Free from fall injury  6/6/2023 0611 by Trinidad Kaur RN  Outcome: Progressing  Flowsheets (Taken 6/6/2023 0611)  Free From Fall Injury: Instruct family/caregiver on patient safety  Note: Bed in lowest position with alarm on and call light within reach     Problem: Pain  Goal: Verbalizes/displays adequate comfort level or baseline comfort level  6/6/2023 0611 by Trinidad Kaur RN  Outcome: Progressing  Flowsheets (Taken 6/6/2023 7304)  Verbalizes/displays adequate comfort level or baseline comfort level:   Encourage patient to monitor pain and request assistance   Assess pain using appropriate pain scale   Administer analgesics based on type and severity of pain and evaluate response   Implement non-pharmacological measures as appropriate and evaluate response   Consider cultural and social influences on pain and pain management

## 2023-06-07 LAB
ALBUMIN SERPL-MCNC: 2.7 G/DL (ref 3.4–5)
ANION GAP SERPL CALCULATED.3IONS-SCNC: 8 MMOL/L (ref 3–16)
ANISOCYTOSIS BLD QL SMEAR: ABNORMAL
BACTERIA UR CULT: ABNORMAL
BASOPHILS # BLD: 0 K/UL (ref 0–0.2)
BASOPHILS NFR BLD: 0.5 %
BUN SERPL-MCNC: 24 MG/DL (ref 7–20)
CALCIUM SERPL-MCNC: 7.9 MG/DL (ref 8.3–10.6)
CHLORIDE SERPL-SCNC: 109 MMOL/L (ref 99–110)
CO2 SERPL-SCNC: 24 MMOL/L (ref 21–32)
CREAT SERPL-MCNC: 1.2 MG/DL (ref 0.8–1.3)
DEPRECATED RDW RBC AUTO: 14.2 % (ref 12.4–15.4)
EOSINOPHIL # BLD: 0 K/UL (ref 0–0.6)
EOSINOPHIL NFR BLD: 1.6 %
GFR SERPLBLD CREATININE-BSD FMLA CKD-EPI: 55 ML/MIN/{1.73_M2}
GLUCOSE BLD-MCNC: 102 MG/DL (ref 70–99)
GLUCOSE SERPL-MCNC: 103 MG/DL (ref 70–99)
HCT VFR BLD AUTO: 28.4 % (ref 40.5–52.5)
HGB BLD-MCNC: 9.4 G/DL (ref 13.5–17.5)
LYMPHOCYTES # BLD: 9.2 K/UL (ref 1–5.1)
LYMPHOCYTES NFR BLD: 69 %
MACROCYTES BLD QL SMEAR: ABNORMAL
MAGNESIUM SERPL-MCNC: 2.1 MG/DL (ref 1.8–2.4)
MCH RBC QN AUTO: 31.3 PG (ref 26–34)
MCHC RBC AUTO-ENTMCNC: 33 G/DL (ref 31–36)
MCV RBC AUTO: 94.9 FL (ref 80–100)
MONOCYTES # BLD: 0.3 K/UL (ref 0–1.3)
MONOCYTES NFR BLD: 2 %
NEUTROPHILS # BLD: 3.9 K/UL (ref 1.7–7.7)
NEUTROPHILS NFR BLD: 29 %
ORGANISM: ABNORMAL
OVALOCYTES BLD QL SMEAR: ABNORMAL
PERFORMED ON: ABNORMAL
PHOSPHATE SERPL-MCNC: 2.5 MG/DL (ref 2.5–4.9)
PLATELET # BLD AUTO: 127 K/UL (ref 135–450)
PMV BLD AUTO: 9.3 FL (ref 5–10.5)
POTASSIUM SERPL-SCNC: 3.8 MMOL/L (ref 3.5–5.1)
RBC # BLD AUTO: 2.99 M/UL (ref 4.2–5.9)
SCHISTOCYTES BLD QL SMEAR: ABNORMAL
SODIUM SERPL-SCNC: 141 MMOL/L (ref 136–145)
WBC # BLD AUTO: 13.3 K/UL (ref 4–11)

## 2023-06-07 PROCEDURE — 97116 GAIT TRAINING THERAPY: CPT

## 2023-06-07 PROCEDURE — 97530 THERAPEUTIC ACTIVITIES: CPT

## 2023-06-07 PROCEDURE — 6360000002 HC RX W HCPCS: Performed by: INTERNAL MEDICINE

## 2023-06-07 PROCEDURE — 6370000000 HC RX 637 (ALT 250 FOR IP)

## 2023-06-07 PROCEDURE — 99232 SBSQ HOSP IP/OBS MODERATE 35: CPT | Performed by: INTERNAL MEDICINE

## 2023-06-07 PROCEDURE — 83735 ASSAY OF MAGNESIUM: CPT

## 2023-06-07 PROCEDURE — 2580000003 HC RX 258

## 2023-06-07 PROCEDURE — 80069 RENAL FUNCTION PANEL: CPT

## 2023-06-07 PROCEDURE — 6360000002 HC RX W HCPCS

## 2023-06-07 PROCEDURE — 6370000000 HC RX 637 (ALT 250 FOR IP): Performed by: INTERNAL MEDICINE

## 2023-06-07 PROCEDURE — 85025 COMPLETE CBC W/AUTO DIFF WBC: CPT

## 2023-06-07 PROCEDURE — 97535 SELF CARE MNGMENT TRAINING: CPT

## 2023-06-07 PROCEDURE — 2580000003 HC RX 258: Performed by: INTERNAL MEDICINE

## 2023-06-07 PROCEDURE — 36415 COLL VENOUS BLD VENIPUNCTURE: CPT

## 2023-06-07 PROCEDURE — 1200000000 HC SEMI PRIVATE

## 2023-06-07 RX ADMIN — ASPIRIN 81 MG: 81 TABLET, COATED ORAL at 08:28

## 2023-06-07 RX ADMIN — PIPERACILLIN AND TAZOBACTAM 3375 MG: 3; .375 INJECTION, POWDER, LYOPHILIZED, FOR SOLUTION INTRAVENOUS at 12:37

## 2023-06-07 RX ADMIN — PIPERACILLIN AND TAZOBACTAM 3375 MG: 3; .375 INJECTION, POWDER, LYOPHILIZED, FOR SOLUTION INTRAVENOUS at 06:36

## 2023-06-07 RX ADMIN — Medication 1 CAPSULE: at 08:28

## 2023-06-07 RX ADMIN — PANTOPRAZOLE SODIUM 40 MG: 40 TABLET, DELAYED RELEASE ORAL at 21:04

## 2023-06-07 RX ADMIN — SODIUM CHLORIDE 25 ML: 9 INJECTION, SOLUTION INTRAVENOUS at 06:35

## 2023-06-07 RX ADMIN — PANTOPRAZOLE SODIUM 40 MG: 40 TABLET, DELAYED RELEASE ORAL at 08:28

## 2023-06-07 RX ADMIN — PIPERACILLIN AND TAZOBACTAM 3375 MG: 3; .375 INJECTION, POWDER, LYOPHILIZED, FOR SOLUTION INTRAVENOUS at 22:12

## 2023-06-07 RX ADMIN — ANTACID TABLETS 500 MG: 500 TABLET, CHEWABLE ORAL at 10:58

## 2023-06-07 RX ADMIN — FINASTERIDE 5 MG: 5 TABLET, FILM COATED ORAL at 08:28

## 2023-06-07 RX ADMIN — LEVOTHYROXINE SODIUM 50 MCG: 50 TABLET ORAL at 06:34

## 2023-06-07 RX ADMIN — HEPARIN SODIUM 5000 UNITS: 5000 INJECTION INTRAVENOUS; SUBCUTANEOUS at 06:35

## 2023-06-07 RX ADMIN — HEPARIN SODIUM 5000 UNITS: 5000 INJECTION INTRAVENOUS; SUBCUTANEOUS at 12:34

## 2023-06-07 RX ADMIN — ATORVASTATIN CALCIUM 80 MG: 80 TABLET, FILM COATED ORAL at 21:04

## 2023-06-07 RX ADMIN — SODIUM CHLORIDE, PRESERVATIVE FREE 10 ML: 5 INJECTION INTRAVENOUS at 21:04

## 2023-06-07 RX ADMIN — DOCUSATE SODIUM 100 MG: 100 CAPSULE, LIQUID FILLED ORAL at 08:28

## 2023-06-07 RX ADMIN — HEPARIN SODIUM 5000 UNITS: 5000 INJECTION INTRAVENOUS; SUBCUTANEOUS at 21:04

## 2023-06-07 NOTE — PLAN OF CARE
Problem: Discharge Planning  Goal: Discharge to home or other facility with appropriate resources  Outcome: Progressing  Flowsheets (Taken 6/7/2023 0601)  Discharge to home or other facility with appropriate resources:   Identify barriers to discharge with patient and caregiver   Arrange for needed discharge resources and transportation as appropriate   Identify discharge learning needs (meds, wound care, etc)     Problem: Safety - Adult  Goal: Free from fall injury  Outcome: Progressing  Flowsheets (Taken 6/7/2023 0601)  Free From Fall Injury: Instruct family/caregiver on patient safety  Note: Bed in lowest position with alarm on and call light within reach     Problem: Pain  Goal: Verbalizes/displays adequate comfort level or baseline comfort level  Outcome: Progressing  Flowsheets (Taken 6/7/2023 0601)  Verbalizes/displays adequate comfort level or baseline comfort level:   Encourage patient to monitor pain and request assistance   Assess pain using appropriate pain scale   Administer analgesics based on type and severity of pain and evaluate response   Implement non-pharmacological measures as appropriate and evaluate response   Consider cultural and social influences on pain and pain management

## 2023-06-08 ENCOUNTER — APPOINTMENT (OUTPATIENT)
Dept: GENERAL RADIOLOGY | Age: 88
DRG: 698 | End: 2023-06-08
Payer: MEDICARE

## 2023-06-08 VITALS
HEIGHT: 74 IN | BODY MASS INDEX: 23.91 KG/M2 | WEIGHT: 186.29 LBS | HEART RATE: 81 BPM | TEMPERATURE: 98 F | OXYGEN SATURATION: 98 % | DIASTOLIC BLOOD PRESSURE: 68 MMHG | SYSTOLIC BLOOD PRESSURE: 152 MMHG | RESPIRATION RATE: 18 BRPM

## 2023-06-08 LAB
ALBUMIN SERPL-MCNC: 2.8 G/DL (ref 3.4–5)
ANION GAP SERPL CALCULATED.3IONS-SCNC: 7 MMOL/L (ref 3–16)
BACTERIA BLD CULT ORG #2: NORMAL
BACTERIA BLD CULT: NORMAL
BASOPHILS # BLD: 0 K/UL (ref 0–0.2)
BASOPHILS NFR BLD: 0 %
BUN SERPL-MCNC: 19 MG/DL (ref 7–20)
CALCIUM SERPL-MCNC: 8.5 MG/DL (ref 8.3–10.6)
CHLORIDE SERPL-SCNC: 107 MMOL/L (ref 99–110)
CO2 SERPL-SCNC: 25 MMOL/L (ref 21–32)
CREAT SERPL-MCNC: 1.1 MG/DL (ref 0.8–1.3)
DEPRECATED RDW RBC AUTO: 14.3 % (ref 12.4–15.4)
EOSINOPHIL # BLD: 0.3 K/UL (ref 0–0.6)
EOSINOPHIL NFR BLD: 2 %
GFR SERPLBLD CREATININE-BSD FMLA CKD-EPI: >60 ML/MIN/{1.73_M2}
GLUCOSE SERPL-MCNC: 100 MG/DL (ref 70–99)
HCT VFR BLD AUTO: 29.9 % (ref 40.5–52.5)
HGB BLD-MCNC: 9.9 G/DL (ref 13.5–17.5)
LYMPHOCYTES # BLD: 10.1 K/UL (ref 1–5.1)
LYMPHOCYTES NFR BLD: 69 %
MAGNESIUM SERPL-MCNC: 2.1 MG/DL (ref 1.8–2.4)
MCH RBC QN AUTO: 31.2 PG (ref 26–34)
MCHC RBC AUTO-ENTMCNC: 33.3 G/DL (ref 31–36)
MCV RBC AUTO: 93.8 FL (ref 80–100)
MONOCYTES # BLD: 0.6 K/UL (ref 0–1.3)
MONOCYTES NFR BLD: 4 %
NEUTROPHILS # BLD: 3.7 K/UL (ref 1.7–7.7)
NEUTROPHILS NFR BLD: 25 %
OVALOCYTES BLD QL SMEAR: ABNORMAL
PHOSPHATE SERPL-MCNC: 2.5 MG/DL (ref 2.5–4.9)
PLATELET # BLD AUTO: 132 K/UL (ref 135–450)
PMV BLD AUTO: 8.8 FL (ref 5–10.5)
POTASSIUM SERPL-SCNC: 4 MMOL/L (ref 3.5–5.1)
RBC # BLD AUTO: 3.18 M/UL (ref 4.2–5.9)
SODIUM SERPL-SCNC: 139 MMOL/L (ref 136–145)
UUN UR-MCNC: 593.2 MG/DL (ref 800–1666)
WBC # BLD AUTO: 14.6 K/UL (ref 4–11)

## 2023-06-08 PROCEDURE — 6360000002 HC RX W HCPCS: Performed by: INTERNAL MEDICINE

## 2023-06-08 PROCEDURE — 83735 ASSAY OF MAGNESIUM: CPT

## 2023-06-08 PROCEDURE — 36415 COLL VENOUS BLD VENIPUNCTURE: CPT

## 2023-06-08 PROCEDURE — 6370000000 HC RX 637 (ALT 250 FOR IP): Performed by: INTERNAL MEDICINE

## 2023-06-08 PROCEDURE — 71045 X-RAY EXAM CHEST 1 VIEW: CPT

## 2023-06-08 PROCEDURE — 2580000003 HC RX 258: Performed by: INTERNAL MEDICINE

## 2023-06-08 PROCEDURE — 85025 COMPLETE CBC W/AUTO DIFF WBC: CPT

## 2023-06-08 PROCEDURE — 80069 RENAL FUNCTION PANEL: CPT

## 2023-06-08 PROCEDURE — 92526 ORAL FUNCTION THERAPY: CPT

## 2023-06-08 PROCEDURE — 6370000000 HC RX 637 (ALT 250 FOR IP)

## 2023-06-08 PROCEDURE — 6360000002 HC RX W HCPCS

## 2023-06-08 RX ORDER — IPRATROPIUM BROMIDE AND ALBUTEROL SULFATE 2.5; .5 MG/3ML; MG/3ML
1 SOLUTION RESPIRATORY (INHALATION) EVERY 4 HOURS PRN
Status: DISCONTINUED | OUTPATIENT
Start: 2023-06-08 | End: 2023-06-08 | Stop reason: HOSPADM

## 2023-06-08 RX ADMIN — PANTOPRAZOLE SODIUM 40 MG: 40 TABLET, DELAYED RELEASE ORAL at 10:41

## 2023-06-08 RX ADMIN — HEPARIN SODIUM 5000 UNITS: 5000 INJECTION INTRAVENOUS; SUBCUTANEOUS at 14:14

## 2023-06-08 RX ADMIN — DOCUSATE SODIUM 100 MG: 100 CAPSULE, LIQUID FILLED ORAL at 10:41

## 2023-06-08 RX ADMIN — Medication 1 CAPSULE: at 10:41

## 2023-06-08 RX ADMIN — FINASTERIDE 5 MG: 5 TABLET, FILM COATED ORAL at 10:40

## 2023-06-08 RX ADMIN — LEVOTHYROXINE SODIUM 50 MCG: 50 TABLET ORAL at 06:13

## 2023-06-08 RX ADMIN — ASPIRIN 81 MG: 81 TABLET, COATED ORAL at 10:41

## 2023-06-08 RX ADMIN — PIPERACILLIN AND TAZOBACTAM 3375 MG: 3; .375 INJECTION, POWDER, LYOPHILIZED, FOR SOLUTION INTRAVENOUS at 06:25

## 2023-06-08 RX ADMIN — HEPARIN SODIUM 5000 UNITS: 5000 INJECTION INTRAVENOUS; SUBCUTANEOUS at 06:13

## 2023-06-08 ASSESSMENT — PAIN SCALES - GENERAL
PAINLEVEL_OUTOF10: 0

## 2023-06-08 ASSESSMENT — PAIN SCALES - WONG BAKER
WONGBAKER_NUMERICALRESPONSE: 0
WONGBAKER_NUMERICALRESPONSE: 0

## 2023-06-08 NOTE — DISCHARGE INSTR - COC
Continuity of Care Form    Patient Name: Inder Wiseman   :  1925  MRN:  [id number]    Admit date:  6/3/2023  Discharge date:  ***    Code Status Order: Limited   Advance Directives:     Admitting Physician:  Meredith Maloney MD  PCP: No primary care provider on file. Discharging Nurse: [facility] Unit/Room#: 8425/9784-50  Discharging Unit Phone Number: ***    Emergency Contact:   Extended Emergency Contact Information  Primary Emergency Contact: [address] [facility] Phone: [phone]  Relation: Child  Secondary Emergency Contact: Jacqueline Yao Phone: [phone]  Work Phone: [phone]  Mobile Phone: [phone]  Relation: Child  Preferred language: English   needed?  No    Past Surgical History:  Past Surgical History:   Procedure Laterality Date    APPENDECTOMY      CATARACT REMOVAL [facility]ilateral     [facility]    CORONARY ANGIOPLASTY WITH STENT PLACEMENT      CYSTOSCOPY N/A 2020    CYSTOSCOPY AND EVACUATION OF CLOTS performed by Carmelo Amador MD at [facility] 62      inguinal    HIP SURGERY Left 2020    LEFT HIP PERCUTANUOUS PINNING performed by Alistair Hernandez DO at [facility] 145 Left     laser repair    Tao Cowper  2018    Dr. Pricilla Espinoza, [facility] 24 N/A 2020    EGD SUBMUCOSAL/BOTOX INJECTION performed by Maria Elena Jean MD at [address] 2020    EGD DILATION BALLOON performed by Maria Elena Jean MD at [address] 2021    EGD SUBMUCOSAL/BOTOX INJECTION performed by Antonia Wade MD at [facility] ENDOSCOPY       Immunization History:   Immunization History   Administered Date(s) Administered    COVID-19, 0 [facility], Primary or Immunocompromised, (age 12y+), IM, 100 mcg/0.5mL 2021, 2021, 10/27/2021, 2022    DT (pediatric)

## 2023-06-08 NOTE — PLAN OF CARE
Problem: Skin/Tissue Integrity  Goal: Absence of new skin breakdown  Description: 1. Monitor for areas of redness and/or skin breakdown  2. Assess vascular access sites hourly  3. Every 4-6 hours minimum:  Change oxygen saturation probe site  4. Every 4-6 hours:  If on nasal continuous positive airway pressure, respiratory therapy assess nares and determine need for appliance change or resting period.   Outcome: Progressing     Problem: Safety - Adult  Goal: Free from fall injury  Flowsheets (Taken 6/8/2023 1825)  Free From Fall Injury:   Based on caregiver fall risk screen, instruct family/caregiver to ask for assistance with transferring infant if caregiver noted to have fall risk factors   Instruct family/caregiver on patient safety     Problem: Pain  Goal: Verbalizes/displays adequate comfort level or baseline comfort level  Flowsheets (Taken 6/8/2023 1825)  Verbalizes/displays adequate comfort level or baseline comfort level:   Encourage patient to monitor pain and request assistance   Assess pain using appropriate pain scale   Administer analgesics based on type and severity of pain and evaluate response   Implement non-pharmacological measures as appropriate and evaluate response     Problem: Chronic Conditions and Co-morbidities  Goal: Patient's chronic conditions and co-morbidity symptoms are monitored and maintained or improved  Flowsheets (Taken 6/8/2023 1825)  Care Plan - Patient's Chronic Conditions and Co-Morbidity Symptoms are Monitored and Maintained or Improved:   Monitor and assess patient's chronic conditions and comorbid symptoms for stability, deterioration, or improvement   Collaborate with multidisciplinary team to address chronic and comorbid conditions and prevent exacerbation or deterioration   Update acute care plan with appropriate goals if chronic or comorbid symptoms are exacerbated and prevent overall improvement and discharge

## 2023-06-08 NOTE — PLAN OF CARE
Problem: Discharge Planning  Goal: Discharge to home or other facility with appropriate resources  Flowsheets (Taken 6/7/2023 0601 by Bg Arreaga RN)  Discharge to home or other facility with appropriate resources:   Identify barriers to discharge with patient and caregiver   Arrange for needed discharge resources and transportation as appropriate   Identify discharge learning needs (meds, wound care, etc)     Problem: Safety - Adult  Goal: Free from fall injury  Outcome: Progressing  Flowsheets (Taken 6/7/2023 0601 by Bg Arreaga RN)  Free From Fall Injury: Instruct family/caregiver on patient safety     Problem: Pain  Goal: Verbalizes/displays adequate comfort level or baseline comfort level  Outcome: Progressing  Flowsheets (Taken 6/7/2023 0601 by Bg Arreaga RN)  Verbalizes/displays adequate comfort level or baseline comfort level:   Encourage patient to monitor pain and request assistance   Assess pain using appropriate pain scale   Administer analgesics based on type and severity of pain and evaluate response   Implement non-pharmacological measures as appropriate and evaluate response   Consider cultural and social influences on pain and pain management

## 2023-06-08 NOTE — DISCHARGE INSTRUCTIONS
Your urinary catheter should now be a Coude catheter instead of a gonzales, as it will be easier to exchange every month. The coude catheter you have now was placed 6/4/2023 and should be exchanged by 7/4/2023.

## 2023-06-08 NOTE — CARE COORDINATION
CM spoke via phone with Kelly Kavitha, regarding SNF referral- states they would be able to accept, but will not have a bed available until Tuesday, June 13. CM will update pt and family today and discuss further DC options. 1145:  CM updated pt at bedside and encouraged him to speak with family about alternative SNF preferences. Pt verbalized understanding. 1300:  CM left HIPAA compliant VM with son, Natty Adjutant, including call back number. 1630:  CM attempted to call Esaw Adjutant again- no answer. CM called nurse to see if he had been at bedside- states he left recently but would be back. Pt states they have not made any decisions yet, but are reviewing list. CM requested RN to encourage pt and Esaw Adjutant to come up with options tonight and leave them on VM so that CM can send referrals in the morning.     Thank you  Cat Concha Babcock RN, BSN, [address]  PCU   [phone]
Services:  Location: Not Applicable  Agency: Not Applicable    Consents signed: Not Indicated    Referrals made at [facility] for outpatient continued care:  Not Applicable    Additional CM Notes:     CM discussed with pt and son, Lenny Dhillon, regarding discharge planning- plan to DC to [facility] for rehab before returning home. Pt accepted at [facility]. Pt and family agreeable to DC to [facility] via [place] at [phone]. CM made pt and Lenny Dhillon aware of DC time. Facility and RN aware and agreeable as well. COVID Result:    Lab Results   Component Value Date/Time    COVID19 Not Detected 06/03/2023 09:36 PM    COVID19 Not Detected 08/30/2020 05:30 PM       The Plan for Transition of Care is related to the following treatment goals of Chambers catheter in place [Z97.8]  MULU (acute kidney injury) ([facility] [address] [N17.9]  Sepsis ([facility] [address] [A41.9]    The Patient and/or patient representative Damari Moscoso and his family were provided with a choice of provider and agrees with the discharge plan Yes    Freedom of choice list was provided with basic dialogue that supports the patient's individualized plan of care/goals and shares the quality data associated with the providers.  Yes    Care Transitions patient: No    Michael Quarles  [facility]  Case Management Department  Ph: [phone]
goals of Chambers catheter in place [Z97.8]  MULU (acute kidney injury) ([facility] [address] [N17.9]  Sepsis ([facility] [address] [J01.5]    IF APPLICABLE: The Patient and/or patient representative Waldo Tran and his family were provided with a choice of provider and agrees with the discharge plan. Freedom of choice list with basic dialogue that supports the patient's individualized plan of care/goals and shares the quality data associated with the providers was provided to: Patient, Patient Representative   Patient Representative Name: Annalise Gilbert     The Patient and/or Patient Representative Agree with the Discharge Plan?  Yes    Josh \[facility]\""  Case Management Department  Ph: [phone]

## 2023-06-08 NOTE — DISCHARGE SUMMARY
INTERNAL MEDICINE DEPARTMENT AT [address] [place] [address]  DISCHARGE SUMMARY    Patient ID: Aliya Cruz                                             Discharge Date: 6/8/2023   Patient's PCP: No primary care provider on file. 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 male who presented to the ED on 6/4/2023 due to confusion and urinary retention. Past medical history includes BPH with obstruction requiring chronic urinary catheter, CAD s/p 3 stents, HTN, HLD, PUD. Patient has a history of an enlarged prostate and requires a chronic gonzales, which gets changed out every month. On Friday around noon, the home health care nurse had a difficult time placing the gonzales and some blood was noted draining. Patient then having fevers the next day and was becoming increasingly confused with some altered speech. History is taken from his sons. The patient was also noted to be putting out a small amount of urine. Sons were concerned and brought patient to hospital. Patient was able to communicated that he has suprapubic tenderness, but could not additionally contribute to the history. In the ED, BP went as low as 81/40. HR was in the 80s and he was oxygenating well on room air. RR was 20. Lactic acid was 3.2 and then 2.6 after fluids. WBC was 23.4. CXR showed, \"Low level of inspiration with mild left basilar opacities favored to be atelectasis\".  The gonzales was

## 2023-06-09 ENCOUNTER — CARE COORDINATION (OUTPATIENT)
Dept: CARE COORDINATION | Age: 88
End: 2023-06-09

## 2023-06-09 NOTE — CARE COORDINATION
[facility] Update Call    2023    Patient: Nikia Primus Patient : 1925   MRN: <[id number]>  Reason for Admission: Severe sepsis, MULU, gonzales catheter in place, UTI (enterococcus faecalis), gross blood in gonzales   Discharge Date: 23 RARS: Readmission Risk Score: 13.3       Email to Donald with [facility] requesting admission orders and med list  Will continue to follow     Care Transitions Post Acute Facility Update    Care Transitions Interventions  Post Acute Facility: [facility].  [address] Update  Reported Nursing Issues: New admission

## 2023-06-14 NOTE — PROGRESS NOTES
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 2/2 infection   MULU likely 2/2 obstruction  Chronic urinary retention  Patient presenting with confusion two days after a traumatic gonzales insertion. Patient has an enlarged prostate and is chronically on a gonzales, which is changed out every month by a home health care nurse. Gonzales exchange was done 6/2 around noon. UA showing large leukocyte esterase and large blood. Patient has past urine cultures positive for Pseudomonas aeruginosa, Enterococcus faecalis, and Klebsiella pneumoniae. Cr on arrival was 2.2. Baseline around 1.1. Cr improving daily. s/p sepsis bolus of 2.5L    - On zosyn (6/5- ) per ID recs. s/p vanc and merrem (6/3-6/5).     - urine cultures pending    - continue IVF  - Urology consulted and rec monthly exchanges with coude catheters in the future to avoid
1st trial with min A of 2 due to post lean. Progressed to standing with walker & min A of 1 on next 2 trials. AM-PAC Score  AM-PAC Inpatient Mobility Raw Score : 12 (06/07/23 1038)  AM-PAC Inpatient T-Scale Score : 35.33 (06/07/23 1038)  Mobility Inpatient CMS 0-100% Score: 68.66 (06/07/23 1038)  Mobility Inpatient CMS G-Code Modifier : CL (06/07/23 1038)             Goals  Short Term Goals  Time Frame for Short Term Goals: by discharge  Short Term Goal 1: Pt to perform bed mobility with min assist -MET 6/7. Revised:  Supine<>sit SBA with rail.   Short Term Goal 2: Pt will transfer sit to and from stand with min assist  ONGOING  Short Term Goal 3: Pt will ambulate 50 feet with wheeled walker and min assist  ONGOING       Education  Patient Education  Education Given To: Patient  Education Provided: Role of Therapy;Plan of Care  Education Provided Comments: need for SNF at dc or 24 hr physical A if goes home  Education Method: Verbal  Barriers to Learning: None  Education Outcome: Verbalized understanding;Continued education needed      Therapy Time   Individual Concurrent Group Co-treatment   Time In 0925         Time Out 1021         Minutes 56          Timed Code Treatment Minutes:   56    Total Treatment Minutes:  [address]
Enterococcus faecalis, Pseudomonas and Klebsiella. - Urine cultures from this admission, first one showing e coli and pseudomonas. - in the past, Enterococcus was sensitive to ampicillin, Pseudomonas and Klebsiella isolates were also sensitive to piperacillin/tazobactam.  - stopped vancomycin and meropenem and started piperacillin/tazobactam as a single agent to cover all previously isolated pathogens on 6/5  - Spoke to micro 6/6 today and first cx seems to be growing ecoli and pseudomonas and second cx with enterococcus faecalis. This is the same previous isolated organisms, as long as sensis the same would be covered with pip-tazo. - Urology has evaluated the patient and does not recommend any further imaging at this time as patient is currently draining clear urine after Chambers replaced. Rec replacement of caude catheter q4 weeks due to prostate enlargement   - Anticipate can complete a short course of antibiotics pending growth in cultures. Plan for 5 days without bacteremia or evidence of upper tract pathology. 6/3 - 6/8    Medical Decision Making: The following items were considered in medical decision making:  Discussion of patient care with other providers  Reviewed clinical lab tests  Reviewed radiology tests  Reviewed other diagnostic tests/interventions  Independent review of radiologic images  Microbiology cultures and other micro tests reviewed      Discussed with pt, pt's son and primary team. Thank you for this consult. We will sign off. Please contact us for any questions or concerns.     [address] Thomas Ozuna MD
propria. The 18mm balloon was then inflated to help diffuse the botox and dilate the EGJ. The site was inspected and there was no obvious complication. There were ulcers extending up to 30cms throughout the esophagus. \"    Bedside Swallowing Evaluation Impression (6/5)  Pt presents with known pharyngoesophageal dysphagia s/p MBS and EGD in August/September 2020. Pt and son reported pt consumes mostly liquid diet at home for ease of swallowing. Pt seated upright and consuming minced and moist solids and thin liquids per diet ordered. Pt appeared to swallow all trials. Immediate wet vocal quality and gurgling noted. Pt with x1 instance of regurgitation and spitting out contents into container. Son endorsed has been ongoing and was scheduled for potential repeat botox injections soon. SLP discussed diet of Puree Solids and Thin Liquids, and rec's for repeat MBS tomorrow. Pt expressed \"I'll have to think about it\". SLP stated will see tomorrow and re-ask if OK to complete MBS. Pt and son in agreement. MBS results 6/6/23  Oral Phase  Adequate for PO trials assessed (limited to baseline diet consistencies of liquids/purees, d/t reduced dentition), with appropriate oral containment, A-P transit, and clearance. No significant residue. Pharyngeal Phase  Mild dysfunction, characterized by delayed swallow initiation and reduced laryngeal vestibule closure, with intermittent penetration and aspiration. It should be noted that the primary source of penetrated/aspirated material was backflow from esophagus into laryngeal vestibule, though pt did directly aspirate initial tsp thin. Pharyngeal swallow timing and laryngeal vestibule protection improved with increased viscosity (mildly thickened liquid) and use of chin tuck for thin liquids; however there was no significant improvement/difference in subsequent esophageal backflow. Residue partially cleared with repeat swallow.   Upper Esophageal Screen  Persistent backflow from
cultures pos for E coli > 100,000, Pseudomonas aeruginosa > 100,000, and enterococcus faecalis > 25,000.     - Urology consulted and rec monthly exchanges with coude catheters in the future to avoid trauma. Coude catheter placed in ED on 6/4/23. Chronic Medical Conditions  BPH    Continue home finasteride     Pharyngoesophageal Dysphagia  Patient usually on liquid diet at home but sons have noticed some coughing and choking. On admission,family was concerned about possible aspiration risk. SLP eval recommend he can return to home diet of pureed solids and thin liquids. Modified barium swallow done 6/6 showed some mild-moderate dysphagia with some aspiration of thin liquids. Recommend continued SLP intervention and supervision with eating. Patient already has outpatient follow up scheduled for this problem and plans on attending that appointment. CAD   Continue home ASA and lipitor     HTN  - home bp meds held     PUD   Continue home Protonix 40mg BID     DVT PPx: SubQ Heparin  Diet: ADULT DIET; Dysphagia - Pureed; Kosher   Code status:  Limited x2 (No to CPR or intubation, Yes to meds and defibrillation or cardioversion). Disposition: PCU then SNF on d/c  OT 13/24  PT 12/24    Will discuss with attending physician Katerine King MD  ________________________  Cisco Ayala MD, PGY-1  Internal Medicine   6/8/2023     Patient seen and examined, labs and imaging studies reviewed, agree with assessment and plan as outlined above. Continue with current care and plan. Discussed case with patients nurse, discussed case with care team, discussed plan. Anticipate dc today to snf sepsis and atn and septic shock continue to improve patient to be discharged in stable condition full discharge note to follow.   Greater than 35 minutes spent on case on day of discharge  Katerine King MD [facility]
encephalopathy 2/2 infection   MULU likely 2/2 obstruction  Chronic urinary retention  Patient presenting with confusion two days after a traumatic gonzales insertion. Patient has an enlarged prostate and is chronically on a gonzales, which is changed out every month by a home health care nurse. Gonzales exchange was done 6/2 around noon. UA showing large leukocyte esterase and large blood. Patient has past urine cultures positive for Pseudomonas aeruginosa, Enterococcus faecalis, and Klebsiella pneumoniae. Cr on arrival was 2.2. Baseline around 1.1.   s/p sepsis bolus of 2.5L    - Continue vanc and merrem  - ID consulted  - urine cultures pending    - continue IVF  - urology consulted       Chronic Medical Conditions  BPH    Continue home finasteride     Dysphagia  Patient usually on liquid diet at home but sons have noticed some coughing and choking. Family concerned about possible aspiration. SLP eval     CAD   Continue home ASA and lipitor     HTN  - home bp meds held     PUD   Continue home Protonix 40mg BID     DVT PPx: Heparin  Diet: ADULT DIET; Dysphagia - Minced and Moist; Kosher   Code status:  Limited   ELOS: Confusion  Barriers to discharge: Urosepsis  Disposition  - Preadmission: Nursing facility  - Current: Medical Floor  - Upon discharge: TBD  - PT/OT    Will discuss with attending physician Josue Salas MD  ________________________  Sue Hurst MD,   PGY-1, Internal Medicine  06/05/23  1:54 PM      Patient seen and examined, labs and imaging studies reviewed, agree with assessment and plan as outlined above. Continue with current care and plan. Discussed case with patients nurse, discussed case with care team, discussed plan. D/w team continue tratment for sepsis secondary to uti, arf secondary to atn.     MD Lui Millan
of previous MBS and rec's for repeat. Son present and informed SLP that pt likely does not recall last MBS and rationale for diet level, however botox injections appeared to help pt's swallowing. SLP recommended puree solids for ease of swallow function and continued use of reflux precautions and other strategies for safety of swallow. Pt and some demonstrated understanding and agreement. Cont. Education  Discussed rationale for evaluation, results and recommendations. Son also present and demonstrated comprehension and agreement.      Pt goal: none stated  Pt discharge goal: TBD    Total treatment time: 20 minutes    Plan: Continue per POC  Recommended Diet: Puree Solids, Thin Liquids   Medication administration: crushed or whole in puree    Strategies:   - upright with all PO  - slow rate of intake  - multiple swallows per bite/sip  - remain seated upright 30 mins after PO intake    Discharge Recommendation: TBD  Discussed with RN, [facility] [place], [address]  Needs met prior to leaving room, call light within reach    Electronically signed by:  Ryne Chester M.A., [address] [place] [address]  Speech-Language Pathologist  Pg #: [phone]    This document will serve as a dc summary if pt dc prior to next visit

## 2023-06-23 ENCOUNTER — OFFICE VISIT (OUTPATIENT)
Dept: INTERNAL MEDICINE CLINIC | Age: 88
End: 2023-06-23

## 2023-06-23 VITALS
WEIGHT: 186 LBS | BODY MASS INDEX: 23.87 KG/M2 | HEART RATE: 76 BPM | SYSTOLIC BLOOD PRESSURE: 138 MMHG | DIASTOLIC BLOOD PRESSURE: 62 MMHG | HEIGHT: 74 IN | TEMPERATURE: 97 F | OXYGEN SATURATION: 100 %

## 2023-06-23 DIAGNOSIS — I10 PRIMARY HYPERTENSION: ICD-10-CM

## 2023-06-23 DIAGNOSIS — N40.1 BPH WITH OBSTRUCTION/LOWER URINARY TRACT SYMPTOMS: ICD-10-CM

## 2023-06-23 DIAGNOSIS — R53.81 PHYSICAL DECONDITIONING: ICD-10-CM

## 2023-06-23 DIAGNOSIS — I25.10 CORONARY ARTERY DISEASE INVOLVING NATIVE CORONARY ARTERY OF NATIVE HEART WITHOUT ANGINA PECTORIS: ICD-10-CM

## 2023-06-23 DIAGNOSIS — N13.8 BPH WITH OBSTRUCTION/LOWER URINARY TRACT SYMPTOMS: ICD-10-CM

## 2023-06-23 DIAGNOSIS — I10 PRIMARY HYPERTENSION: Primary | ICD-10-CM

## 2023-06-23 DIAGNOSIS — E03.9 ACQUIRED HYPOTHYROIDISM: ICD-10-CM

## 2023-06-23 DIAGNOSIS — I10 BENIGN ESSENTIAL HTN: ICD-10-CM

## 2023-06-23 DIAGNOSIS — I77.9 BILATERAL CAROTID ARTERY DISEASE, UNSPECIFIED TYPE (HCC): ICD-10-CM

## 2023-06-23 DIAGNOSIS — I50.32 CHRONIC DIASTOLIC CHF (CONGESTIVE HEART FAILURE) (HCC): ICD-10-CM

## 2023-06-23 DIAGNOSIS — R13.11 ORAL PHASE DYSPHAGIA: ICD-10-CM

## 2023-06-23 PROBLEM — R13.19 OTHER DYSPHAGIA: Status: ACTIVE | Noted: 2020-08-26

## 2023-06-23 PROBLEM — R65.20 SEVERE SEPSIS (HCC): Status: RESOLVED | Noted: 2023-06-04 | Resolved: 2023-06-23

## 2023-06-23 PROBLEM — N39.0 URINARY TRACT INFECTION ASSOCIATED WITH INDWELLING URETHRAL CATHETER (HCC): Status: RESOLVED | Noted: 2021-02-01 | Resolved: 2023-06-23

## 2023-06-23 PROBLEM — A41.9 SEVERE SEPSIS (HCC): Status: RESOLVED | Noted: 2023-06-04 | Resolved: 2023-06-23

## 2023-06-23 PROBLEM — T83.511A URINARY TRACT INFECTION ASSOCIATED WITH INDWELLING URETHRAL CATHETER (HCC): Status: RESOLVED | Noted: 2021-02-01 | Resolved: 2023-06-23

## 2023-06-23 RX ORDER — ZINC OXIDE 20 %
OINTMENT (GRAM) TOPICAL PRN
COMMUNITY
End: 2023-06-23 | Stop reason: SDUPTHER

## 2023-06-23 RX ORDER — ATORVASTATIN CALCIUM 80 MG/1
80 TABLET, FILM COATED ORAL NIGHTLY
Qty: 90 TABLET | Refills: 1 | Status: SHIPPED | OUTPATIENT
Start: 2023-06-23

## 2023-06-23 RX ORDER — ZINC OXIDE 20 %
OINTMENT (GRAM) TOPICAL PRN
Qty: 90 EACH | Refills: 1 | Status: SHIPPED | OUTPATIENT
Start: 2023-06-23

## 2023-06-23 RX ORDER — TORSEMIDE 20 MG/1
20 TABLET ORAL DAILY
Qty: 60 TABLET | Refills: 4 | Status: SHIPPED | OUTPATIENT
Start: 2023-06-23

## 2023-06-23 RX ORDER — LISINOPRIL 20 MG/1
20 TABLET ORAL DAILY
Qty: 90 TABLET | Refills: 1 | Status: SHIPPED | OUTPATIENT
Start: 2023-06-23

## 2023-06-23 SDOH — ECONOMIC STABILITY: FOOD INSECURITY: WITHIN THE PAST 12 MONTHS, THE FOOD YOU BOUGHT JUST DIDN'T LAST AND YOU DIDN'T HAVE MONEY TO GET MORE.: NEVER TRUE

## 2023-06-23 SDOH — ECONOMIC STABILITY: FOOD INSECURITY: WITHIN THE PAST 12 MONTHS, YOU WORRIED THAT YOUR FOOD WOULD RUN OUT BEFORE YOU GOT MONEY TO BUY MORE.: NEVER TRUE

## 2023-06-23 SDOH — ECONOMIC STABILITY: INCOME INSECURITY: HOW HARD IS IT FOR YOU TO PAY FOR THE VERY BASICS LIKE FOOD, HOUSING, MEDICAL CARE, AND HEATING?: NOT HARD AT ALL

## 2023-06-23 SDOH — ECONOMIC STABILITY: HOUSING INSECURITY
IN THE LAST 12 MONTHS, WAS THERE A TIME WHEN YOU DID NOT HAVE A STEADY PLACE TO SLEEP OR SLEPT IN A SHELTER (INCLUDING NOW)?: NO

## 2023-06-23 ASSESSMENT — PATIENT HEALTH QUESTIONNAIRE - PHQ9
SUM OF ALL RESPONSES TO PHQ9 QUESTIONS 1 & 2: 1
SUM OF ALL RESPONSES TO PHQ QUESTIONS 1-9: 1
SUM OF ALL RESPONSES TO PHQ QUESTIONS 1-9: 1
2. FEELING DOWN, DEPRESSED OR HOPELESS: 0
SUM OF ALL RESPONSES TO PHQ QUESTIONS 1-9: 1
SUM OF ALL RESPONSES TO PHQ QUESTIONS 1-9: 1
1. LITTLE INTEREST OR PLEASURE IN DOING THINGS: 1

## 2023-06-23 ASSESSMENT — ENCOUNTER SYMPTOMS
VOMITING: 0
ABDOMINAL PAIN: 0
COUGH: 0
CHOKING: 0
STRIDOR: 0
ABDOMINAL DISTENTION: 0
PHOTOPHOBIA: 0
VOICE CHANGE: 0
DIARRHEA: 0
NAUSEA: 0
APNEA: 0
CONSTIPATION: 0
SHORTNESS OF BREATH: 0
WHEEZING: 0
CHEST TIGHTNESS: 0
TROUBLE SWALLOWING: 0

## 2023-06-23 NOTE — ASSESSMENT & PLAN NOTE
Appears to be compensated  Does have +2 to +3 pitting LE edema, which is chronic and stable  Last echo was in 2020 with normal EF  Continue with current medication for now

## 2023-06-23 NOTE — ASSESSMENT & PLAN NOTE
Overall physically deconditioned after recent hospitalization for urosepsis  -Continue physical therapy and Occupational Therapy

## 2023-06-23 NOTE — ASSESSMENT & PLAN NOTE
Does have chronic urinary retention requiring chronic indwelling catheter  Catheter exchanged monthly  Continue to monitor

## 2023-06-23 NOTE — ASSESSMENT & PLAN NOTE
Has a known history of achalasia and dysphagia.   See GI with intermittent EGD with Botox treatment or dilation  -Pending EGD in August 2023  -Continue puréed diet  -Continue speech therapy  -Monitor weight at home

## 2023-06-23 NOTE — PROGRESS NOTES
Addison Shaw (:  1925) is a 80 y.o. male,Established patient, here for evaluation of the following chief complaint(s):  New Patient (NEW PATIENT)         ASSESSMENT/PLAN:  1. Primary hypertension  -     Vitamin B12 & Folate; Future  -     TSH with Reflex; Future  2. Chronic diastolic CHF (congestive heart failure) (HCC)  Assessment & Plan:   Appears to be compensated  Does have +2 to +3 pitting LE edema, which is chronic and stable  Last echo was in  with normal EF  Continue with current medication for now    3. Bilateral carotid artery disease, unspecified type ([facility] [address]  Assessment & Plan:  Overall stable, on statin  4. Benign essential HTN  Assessment & Plan:  BP at goal  Continue with current medications  5. 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 symptoms  Assessment & Plan:  Does have chronic urinary retention requiring chronic indwelling catheter  Catheter exchanged monthly  Continue to monitor  6. Coronary artery disease involving native coronary artery of native heart without angina pectoris  Assessment & Plan:  Stents x3  No chest pain  Continue with current medication  7. Oral phase dysphagia  Assessment & Plan:  Has a known history of achalasia and dysphagia. See GI with intermittent EGD with Botox treatment or dilation  -Pending EGD in 2023  -Continue puréed diet  -Continue speech therapy  -Monitor weight at home  8. Physical deconditioning  Assessment & Plan:  Overall physically deconditioned after recent hospitalization for urosepsis  -Continue physical therapy and Occupational Therapy    9. Acquired hypothyroidism  Assessment & Plan:   Recheck TSH  Continue with current medication      Return in about 6 months (around 2023) for Thyroid, Hypertension, BPH . Subjective   SUBJECTIVE/OBJECTIVE:  HPI  The patient is a 80-year-old male who presents with his son today to establish care. He was a long-term patient of Dr. Koby Ballard, transition to Dr. Marlon Diez.   He also see

## 2023-06-24 LAB
FOLATE SERPL-MCNC: >20 NG/ML (ref 4.78–24.2)
T4 FREE SERPL-MCNC: 1.4 NG/DL (ref 0.9–1.8)
TSH SERPL DL<=0.005 MIU/L-ACNC: 8.26 UIU/ML (ref 0.27–4.2)
VIT B12 SERPL-MCNC: 930 PG/ML (ref 211–911)

## 2023-06-26 ENCOUNTER — CARE COORDINATION (OUTPATIENT)
Dept: CARE COORDINATION | Age: 88
End: 2023-06-26

## 2023-06-26 ENCOUNTER — TELEPHONE (OUTPATIENT)
Dept: INTERNAL MEDICINE CLINIC | Age: 88
End: 2023-06-26

## 2023-06-27 ENCOUNTER — CARE COORDINATION (OUTPATIENT)
Dept: CARE COORDINATION | Age: 88
End: 2023-06-27

## 2023-06-27 SDOH — HEALTH STABILITY: PHYSICAL HEALTH: ON AVERAGE, HOW MANY MINUTES DO YOU ENGAGE IN EXERCISE AT THIS LEVEL?: 30 MIN

## 2023-06-27 SDOH — ECONOMIC STABILITY: INCOME INSECURITY: IN THE LAST 12 MONTHS, WAS THERE A TIME WHEN YOU WERE NOT ABLE TO PAY THE MORTGAGE OR RENT ON TIME?: NO

## 2023-06-27 SDOH — ECONOMIC STABILITY: TRANSPORTATION INSECURITY
IN THE PAST 12 MONTHS, HAS THE LACK OF TRANSPORTATION KEPT YOU FROM MEDICAL APPOINTMENTS OR FROM GETTING MEDICATIONS?: NO

## 2023-06-27 SDOH — ECONOMIC STABILITY: TRANSPORTATION INSECURITY
IN THE PAST 12 MONTHS, HAS LACK OF TRANSPORTATION KEPT YOU FROM MEETINGS, WORK, OR FROM GETTING THINGS NEEDED FOR DAILY LIVING?: NO

## 2023-06-27 SDOH — HEALTH STABILITY: PHYSICAL HEALTH: ON AVERAGE, HOW MANY DAYS PER WEEK DO YOU ENGAGE IN MODERATE TO STRENUOUS EXERCISE (LIKE A BRISK WALK)?: 4 DAYS

## 2023-06-27 SDOH — ECONOMIC STABILITY: HOUSING INSECURITY: IN THE LAST 12 MONTHS, HOW MANY PLACES HAVE YOU LIVED?: 1

## 2023-06-27 ASSESSMENT — SOCIAL DETERMINANTS OF HEALTH (SDOH)
HOW OFTEN DO YOU GET TOGETHER WITH FRIENDS OR RELATIVES?: MORE THAN THREE TIMES A WEEK
HOW OFTEN DO YOU ATTEND CHURCH OR RELIGIOUS SERVICES?: MORE THAN 4 TIMES PER YEAR
HOW OFTEN DO YOU ATTENT MEETINGS OF THE CLUB OR ORGANIZATION YOU BELONG TO?: 1 TO 4 TIMES PER YEAR
DO YOU BELONG TO ANY CLUBS OR ORGANIZATIONS SUCH AS CHURCH GROUPS UNIONS, FRATERNAL OR ATHLETIC GROUPS, OR SCHOOL GROUPS?: NO
IN A TYPICAL WEEK, HOW MANY TIMES DO YOU TALK ON THE PHONE WITH FAMILY, FRIENDS, OR NEIGHBORS?: MORE THAN THREE TIMES A WEEK

## 2023-07-11 ENCOUNTER — CARE COORDINATION (OUTPATIENT)
Dept: CARE COORDINATION | Age: 88
End: 2023-07-11

## 2023-07-11 NOTE — CARE COORDINATION
ACRADHA/RN attempted outreach to [employer], verified on [address] No answer, voice mail left with contact information and requesting a return call. Galen ALBARRAN, RN  Ambulatory Care Manager  [email or web address]  [phone]

## 2023-07-17 ENCOUNTER — TELEPHONE (OUTPATIENT)
Dept: INTERNAL MEDICINE CLINIC | Age: 88
End: 2023-07-17

## 2023-07-17 NOTE — TELEPHONE ENCOUNTER
Patrick Carey is requesting the order be signed by the doctor for plan of care for skilled nursing PT and OT orders.      States order number is [id number]      Please advise Patrick Carey at [phone]

## 2023-07-19 ENCOUNTER — CARE COORDINATION (OUTPATIENT)
Dept: CASE MANAGEMENT | Age: 88
End: 2023-07-19

## 2023-07-19 NOTE — CARE COORDINATION
Ambulatory Care Coordination Note  2023    Patient Current Location:  Home: [address] [place] [id number]     LPN [facility] contacted the family by telephone. Verified name and  with family as identifiers. Provided introduction to self, and explanation of the LPN CC role. Challenges to be reviewed by the provider   Additional needs identified to be addressed with provider: No  none               Method of communication with provider: none. ACM: Kim Leivne LPN  Patients gildardo Locke reports patient is doing better day by day. Denies cp, sob, falls, or swelling. Chambers catheter patent and raining clear urine. He is active with PT and OT weekly. No needs at this time. Plan:  F/U on Fall Safety references, mailed. F/U on CHF Zone and HTN information, Mailed. Complete PCAM, SDOH. Discuss AD's  Assess needs. Offered patient enrollment in the Remote Patient Monitoring (RPM) program for in-home monitoring: NA. Lab Results       None                 Goals Addressed    None         No future appointments. ,   Congestive Heart Failure Assessment           Symptoms:          , and   General Assessment             Kim Levine LPN  Care Coordinator  [phone]

## 2023-07-26 ENCOUNTER — CARE COORDINATION (OUTPATIENT)
Dept: CARE COORDINATION | Age: 88
End: 2023-07-26

## 2023-07-26 NOTE — CARE COORDINATION
ACRADHA/RN attempted outreach to [employer], verified on [address] No answer, voice mail left with contact information and requesting a return call. Galen ALBARRAN, RN  Ambulatory Care Manager  [email or web address]  [phone]

## 2023-08-17 ENCOUNTER — CARE COORDINATION (OUTPATIENT)
Dept: CARE COORDINATION | Age: 88
End: 2023-08-17

## 2023-08-17 NOTE — CARE COORDINATION
ACM/RN attempted outreach to patient. No answer, voice mail left with contact information and requesting a return call. Xavi ALBARRAN, RN  Ambulatory Care Manager  [email or web address]  [phone]

## 2023-08-26 ENCOUNTER — ENROLLMENT (OUTPATIENT)
Dept: CARE COORDINATION | Age: 88
End: 2023-08-26

## 2023-08-28 ENCOUNTER — TELEPHONE (OUTPATIENT)
Dept: INTERNAL MEDICINE CLINIC | Age: 88
End: 2023-08-28

## 2023-08-28 ENCOUNTER — ANESTHESIA EVENT (OUTPATIENT)
Dept: ENDOSCOPY | Age: 88
End: 2023-08-28
Payer: MEDICARE

## 2023-08-28 RX ORDER — NYSTATIN 100000 [USP'U]/G
POWDER TOPICAL
Qty: 1 EACH | Refills: 1 | Status: SHIPPED | OUTPATIENT
Start: 2023-08-28

## 2023-08-28 NOTE — TELEPHONE ENCOUNTER
Reason for call;  Pt called for xray films  On 11/16/19  And  Conferenced to Tonny Martins and was explained to Pt that everything is digitalized now - no films.   Recommendation / teaching ; FNA conferenced Pt to  for setting up mychart .      Caller Verbalizes understanding and denies further questions and will call back if further symptoms to triage or questions  .  Marlen Spangler RN  - [place] Nurse Advisor                   They change his catheter once a month. Saw him today and it looks like he has a little yeast infection down around his groin area. Would Dr. Tere Barrios call in something for him.     Nixon Ramos [phone] [address]    Please call back and advise

## 2023-08-29 ENCOUNTER — ANESTHESIA (OUTPATIENT)
Dept: ENDOSCOPY | Age: 88
End: 2023-08-29
Payer: MEDICARE

## 2023-08-29 ENCOUNTER — HOSPITAL ENCOUNTER (OUTPATIENT)
Age: 88
Setting detail: OUTPATIENT SURGERY
Discharge: HOME | End: 2023-08-29
Attending: INTERNAL MEDICINE | Admitting: INTERNAL MEDICINE
Payer: MEDICARE

## 2023-08-29 VITALS
SYSTOLIC BLOOD PRESSURE: 118 MMHG | TEMPERATURE: 97.8 F | OXYGEN SATURATION: 100 % | WEIGHT: 170 LBS | HEIGHT: 71 IN | HEART RATE: 84 BPM | BODY MASS INDEX: 23.8 KG/M2 | RESPIRATION RATE: 16 BRPM | DIASTOLIC BLOOD PRESSURE: 65 MMHG

## 2023-08-29 PROCEDURE — 2709999900 HC NON-CHARGEABLE SUPPLY: Performed by: INTERNAL MEDICINE

## 2023-08-29 PROCEDURE — 3609013800 HC EGD SUBMUCOSAL/BOTOX INJECTION: Performed by: INTERNAL MEDICINE

## 2023-08-29 PROCEDURE — 2580000003 HC RX 258

## 2023-08-29 PROCEDURE — 2580000003 HC RX 258: Performed by: ANESTHESIOLOGY

## 2023-08-29 PROCEDURE — 7100000010 HC PHASE II RECOVERY - FIRST 15 MIN: Performed by: INTERNAL MEDICINE

## 2023-08-29 PROCEDURE — 6360000002 HC RX W HCPCS

## 2023-08-29 PROCEDURE — C1726 CATH, BAL DIL, NON-VASCULAR: HCPCS | Performed by: INTERNAL MEDICINE

## 2023-08-29 PROCEDURE — 3700000001 HC ADD 15 MINUTES (ANESTHESIA): Performed by: INTERNAL MEDICINE

## 2023-08-29 PROCEDURE — 2500000003 HC RX 250 WO HCPCS

## 2023-08-29 PROCEDURE — 3700000000 HC ANESTHESIA ATTENDED CARE: Performed by: INTERNAL MEDICINE

## 2023-08-29 PROCEDURE — 7100000011 HC PHASE II RECOVERY - ADDTL 15 MIN: Performed by: INTERNAL MEDICINE

## 2023-08-29 RX ORDER — SODIUM CHLORIDE, SODIUM LACTATE, POTASSIUM CHLORIDE, CALCIUM CHLORIDE 600; 310; 30; 20 MG/100ML; MG/100ML; MG/100ML; MG/100ML
INJECTION, SOLUTION INTRAVENOUS CONTINUOUS PRN
Status: DISCONTINUED | OUTPATIENT
Start: 2023-08-29 | End: 2023-08-29 | Stop reason: SDUPTHER

## 2023-08-29 RX ORDER — PROPOFOL 10 MG/ML
INJECTION, EMULSION INTRAVENOUS PRN
Status: DISCONTINUED | OUTPATIENT
Start: 2023-08-29 | End: 2023-08-29 | Stop reason: SDUPTHER

## 2023-08-29 RX ORDER — GLYCOPYRROLATE 0.2 MG/ML
INJECTION INTRAMUSCULAR; INTRAVENOUS PRN
Status: DISCONTINUED | OUTPATIENT
Start: 2023-08-29 | End: 2023-08-29 | Stop reason: SDUPTHER

## 2023-08-29 RX ORDER — UREA 10 %
500 LOTION (ML) TOPICAL PRN
COMMUNITY
Start: 2023-06-04

## 2023-08-29 RX ORDER — SODIUM CHLORIDE, SODIUM LACTATE, POTASSIUM CHLORIDE, CALCIUM CHLORIDE 600; 310; 30; 20 MG/100ML; MG/100ML; MG/100ML; MG/100ML
INJECTION, SOLUTION INTRAVENOUS CONTINUOUS
Status: DISCONTINUED | OUTPATIENT
Start: 2023-08-29 | End: 2023-08-29 | Stop reason: HOSPADM

## 2023-08-29 RX ADMIN — PROPOFOL 10 MG: 10 INJECTION, EMULSION INTRAVENOUS at 13:24

## 2023-08-29 RX ADMIN — PROPOFOL 20 MG: 10 INJECTION, EMULSION INTRAVENOUS at 13:04

## 2023-08-29 RX ADMIN — PROPOFOL 10 MG: 10 INJECTION, EMULSION INTRAVENOUS at 13:27

## 2023-08-29 RX ADMIN — PROPOFOL 10 MG: 10 INJECTION, EMULSION INTRAVENOUS at 13:10

## 2023-08-29 RX ADMIN — PROPOFOL 10 MG: 10 INJECTION, EMULSION INTRAVENOUS at 13:06

## 2023-08-29 RX ADMIN — PROPOFOL 10 MG: 10 INJECTION, EMULSION INTRAVENOUS at 13:12

## 2023-08-29 RX ADMIN — PROPOFOL 10 MG: 10 INJECTION, EMULSION INTRAVENOUS at 13:30

## 2023-08-29 RX ADMIN — SODIUM CHLORIDE, POTASSIUM CHLORIDE, SODIUM LACTATE AND CALCIUM CHLORIDE: 600; 310; 30; 20 INJECTION, SOLUTION INTRAVENOUS at 12:36

## 2023-08-29 RX ADMIN — SODIUM CHLORIDE, SODIUM LACTATE, POTASSIUM CHLORIDE, AND CALCIUM CHLORIDE: .6; .31; .03; .02 INJECTION, SOLUTION INTRAVENOUS at 12:58

## 2023-08-29 RX ADMIN — GLYCOPYRROLATE 0.2 MG: 0.2 INJECTION INTRAMUSCULAR; INTRAVENOUS at 13:01

## 2023-08-29 RX ADMIN — PROPOFOL 10 MG: 10 INJECTION, EMULSION INTRAVENOUS at 13:20

## 2023-08-29 RX ADMIN — PROPOFOL 10 MG: 10 INJECTION, EMULSION INTRAVENOUS at 13:17

## 2023-08-29 RX ADMIN — PROPOFOL 10 MG: 10 INJECTION, EMULSION INTRAVENOUS at 13:08

## 2023-08-29 RX ADMIN — PROPOFOL 10 MG: 10 INJECTION, EMULSION INTRAVENOUS at 13:22

## 2023-08-29 ASSESSMENT — PAIN SCALES - GENERAL
PAINLEVEL_OUTOF10: 0
PAINLEVEL_OUTOF10: 0

## 2023-08-29 ASSESSMENT — PAIN - FUNCTIONAL ASSESSMENT: PAIN_FUNCTIONAL_ASSESSMENT: 0-10

## 2023-08-29 NOTE — ANESTHESIA POSTPROCEDURE EVALUATION
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])    Surgeons: Denise Mcdaniels MD Responsible Provider: Dereck Martini MD    Anesthesia Type: MAC ASA Status: 3          Anesthesia Type: MAC    Maria Esther Phase I: Maria Esther Score: 10    Maria Esther Phase II: Maria Esther Score: 10      Anesthesia Post Evaluation    Patient location during evaluation: PACU  Patient participation: complete - patient participated  Level of consciousness: awake and alert  Pain score: 0  Airway patency: patent  Nausea & Vomiting: no nausea and no vomiting  Complications: no  Cardiovascular status: hemodynamically stable  Respiratory status: acceptable  Hydration status: euvolemic  Pain management: adequate

## 2023-08-29 NOTE — ANESTHESIA PRE PROCEDURE
Department of Anesthesiology  Preprocedure Note       Name:  Flaco Townsend   Age:  80 y.o.  :  1925                                          MRN:  [id number]         Date:  2023      Surgeon: Ifeanyi Nevarez):  Citlaly Lara MD    Procedure: Procedure(s):  ESOPHAGOGASTRODUODENOSCOPY    Medications prior to admission:   Prior to Admission medications    Medication Sig Start Date End Date Taking? Authorizing Provider   nystatin (MYCOSTATIN) 081382 UNIT/GM powder Apply 3 times daily. 23   Ronnie Molina MD   zinc oxide 20 % ointment Apply topically as needed for Dry Skin Apply topically as needed.  23   Ronnie Molina MD   lisinopril (PRINIVIL;ZESTRIL) 20 MG tablet Take 1 tablet by mouth daily 23   Ronnie Molina MD   torsemide [facility]) 20 MG tablet Take 1 tablet by mouth daily 23   Ronnie Molina MD   atorvastatin (LIPITOR) 80 MG tablet Take 1 tablet by mouth nightly 23   Ronnie Molina MD   Multiple Vitamins-Minerals (HM MULTIVITAMIN ADULT GUMMY PO) Take by mouth daily    Historical Provider, MD   vitamin B-12 (CYANOCOBALAMIN) 100 MCG tablet Take 10 tablets by mouth daily    Historical Provider, MD   docusate sodium (COLACE) 100 MG capsule Take 1 capsule by mouth daily 2023 (med does not appear on patient's paper list)    Historical Provider, MD   potassium chloride (KLOR-CON) 10 MEQ extended release tablet TAKE ONE TABLET BY MOUTH DAILY 21   Radha Rodriguez MD   finasteride (PROSCAR) 5 MG tablet Take 1 tablet by mouth daily 21   Radha Rodriguez MD   diclofenac sodium (VOLTAREN) 1 % GEL APPLY TWO GRAMS TO THE AFFECTED AREA OF LEFT KNEE TWO TIMES DAILY  Patient taking differently: 2023 (med does not appear on patient's paper list) 3/8/21   Radha Rodriguez MD   levothyroxine (SYNTHROID) 50 MCG tablet Take 1 tablet by mouth Daily 10/16/20   Radha Rodriguez MD   B Complex Vitamins (VITAMIN B COMPLEX) TABS Take 1 tablet by mouth daily    Historical Provider, MD
fall

## 2023-08-29 NOTE — PROCEDURES
EGD REPORT    Patient:  Jasiel Bearden                  2/27/1925    Referring Physician:  Ermelinda Yeh    Endoscopist: Patricia Sorto     Indication:  achalasia     Medications:  MAC      Pre-Anesthesia Assessment:  I have reviewed and am in agreement with patient history and medication, including previous response to sedation. Prior to the procedure, a History and Physical was performed, and patient medications and allergies were reviewed. The patient is competent. The risks and benefits of the procedure and the sedation options and risks were discussed with the patient and his son. Risks discussed included but were not limited to infection, bleeding, perforation, death, and missed lesions. All questions were answered and informed consent was obtained. Patient identification and proposed procedure were verified by the physician and the nurse in the pre-procedure area in the procedure room. Mallampatti: II  ASA Grade Assessment: 3     After reviewing the risks and benefits, the patient was deemed in satisfactory condition to undergo the procedure. The anesthesia plan was to use MAC anesthesia. Immediately prior to administration of medications, the patient was re-assessed for adequacy to receive sedatives and a time out was performed. Patient and healthcare providers were in agreement it was the correct patient and procedure. The heart rate, respiratory rate, oxygen saturations, blood pressure, adequacy of pulmonary ventilation, and response to care were monitored throughout the procedure. The physical status of the patient was re-assessed after the procedure. After obtaining informed consent, the endoscope was passed under direct vision. The endoscope was introduced through the mouth, and advanced to the second part of duodenum. The EGD was accomplished without difficulty. The patient tolerated the procedure well. Upon entering the esophagus there was a large amount of food debris.  This was removed with

## 2023-08-29 NOTE — H&P
Pre-operative History and Physical    Patient: Seth Richter  : 1925     History Obtained From:  patient, electronic medical record    HISTORY OF PRESENT ILLNESS:    The patient is a 80 y.o. male who presents for an EGD for botox for achalasia. Past Medical History:        Diagnosis Date    Achalasia     Arthritis     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     [facility]    CAD (coronary artery disease)     stents x 3 - dr han    Cervical spondylosis     Chronic heart failure with normal ejection fraction (HCC)     diastolic    DVT (deep venous thrombosis) ([address])     right leg    GERD (gastroesophageal reflux disease)     Hemorrhoids     Hyperlipidemia     Hypertension     PUD (peptic ulcer disease)     Sciatica     War injury due to shrapnel     leg     Past Surgical History:        Procedure Laterality Date    APPENDECTOMY      CATARACT REMOVAL Bilateral     [facility]    CORONARY ANGIOPLASTY WITH STENT PLACEMENT      CYSTOSCOPY N/A 2020    CYSTOSCOPY AND EVACUATION OF CLOTS performed by Bernie Castro MD at [address] [facility]      inguinal    HIP SURGERY Left 2020    LEFT HIP PERCUTANUOUS PINNING performed by Rupa Ramirez DO at [address] [facility] Left     laser repair    Chito Shon  2018    Dr. Peggy Aden, [address] [place] [address],[place] [address] N/A 2020    EGD SUBMUCOSAL/BOTOX INJECTION performed by Naz Alicea MD at [facility] 2020    EGD DILATION BALLOON performed by Naz Alicea MD at [facility] 2021    EGD SUBMUCOSAL/BOTOX INJECTION performed by John Carrero MD at [facility] ENDOSCOPY     Medications Prior to Admission:   No current facility-administered medications on file prior to encounter.      Current Outpatient Medications on File Prior to Encounter   Medication Sig Dispense III: Soft palate, base of uvula visible  __________  Class IV: Hard palate only visible   __________      ASSESSMENT AND PLAN:    1. Patient is a 80 y.o. male here for EGD with deep sedation  2. Procedure options, risks and benefits reviewed with patient. We specifically discussed that risks include, but are not limited to infection, bleeding, perforation, death, and missed lesions. Patient expresses understanding.

## 2023-08-29 NOTE — PROGRESS NOTES
Ambulatory Surgery/Procedure Discharge Note    Vitals:    08/29/23 1400   BP: 118/65   Pulse: 84   Resp: 16   Temp:    SpO2: 100%       In: 1220 [P.O.:120; I.V.:1100]  Out: -     Restroom use offered before discharge. Yes    Pain assessment:  none  Pain Level: 0    Patient denies pain. Patient is tolerating all PO intake. Patient and family states \"ready to go home\". IV removed. Patient offered to void before discharge. Discharge instructions given to patient and patients son. Patient and son verbalized understanding of all instructions. Medical transportation set up by son to take patient home. Son riding with patient back home. Patient is ready for discharge. Patient discharged to home/self care.  Patient discharged via wheel chair by transporter to waiting family/S.O.       8/29/2023 2:27 PM

## 2023-09-14 ENCOUNTER — CARE COORDINATION (OUTPATIENT)
Dept: CARE COORDINATION | Age: 88
End: 2023-09-14

## 2023-09-14 NOTE — CARE COORDINATION
Ambulatory Care Coordination Note  2023    Patient Current Location:  Home: [address] [place] [id number]     LPN [facility] contacted the family by telephone. Verified name and  with family as identifiers. Provided introduction to self, and explanation of the LPN CC role. Challenges to be reviewed by the provider   Additional needs identified to be addressed with provider: No  none               Method of communication with provider: none. ACM: Maryellen Mccormack LPN    LPN CC spoke with patient's son, Melissa Lea, HIPAA verified. States patient is doing fine. Melissa Lea does report patient is experiencing some diarrhea. He feels this is diet related as patient is eating more solids and less liquids recently. Patient was seen by GI & his achalasia was addressed via botox injection . Melissa Lea has noticed improvement after tx. Patient continues to be active with therapy at home, strength is improving with PT & OT. Melissa Lea denies having received any resources via mail. LPN CC verified correct address & will notify ACM that they have not been received. Denies medication changes. Denies needs. States patient does have DNR, he feels this is sufficient in regards to ACP documents. Plan:   Resources in mail HTN, CHF, fall safety  Swallow, diet, bowels  Therapies active  Assess for needs    Maryellen Mccormack  [facility]  [phone]    Offered patient enrollment in the Remote Patient Monitoring (RPM) program for in-home monitoring: NA. Lab Results       None                 Goals Addressed    None         No future appointments.

## 2023-09-29 ENCOUNTER — TELEPHONE (OUTPATIENT)
Dept: INTERNAL MEDICINE CLINIC | Age: 88
End: 2023-09-29

## 2023-09-29 DIAGNOSIS — L89.229 PRESSURE INJURY OF SKIN OF LEFT HIP, UNSPECIFIED INJURY STAGE: Primary | ICD-10-CM

## 2023-09-29 NOTE — TELEPHONE ENCOUNTER
Geovanny Sanchez from [address] [place] [address] called in stating that the he went out to see the patient for his monthly catheter change and noticed that the patient had a pressure ulcer on his left hill. Wanted to make Dr. Shan Johnson aware. Pls advise.

## 2023-10-02 NOTE — TELEPHONE ENCOUNTER
[phone] (home)   Spoke with Sai Winter  home health care nurse   Informed him that DR WAS placing order for [address] ( PRESSURE ULCER (L) HIP AREA/  NUMBER GIVEN TO MAKE APPT. [phone]  SPOKE WITH SON Adam Celaya [phone]  HE STATED THE AREA IS GETTING BETTER HE WOULD KEEP INFORMATION ON HAND, IF NEEDED

## 2023-10-04 ENCOUNTER — CARE COORDINATION (OUTPATIENT)
Dept: CARE COORDINATION | Age: 88
End: 2023-10-04

## 2023-10-04 NOTE — CARE COORDINATION
Patient goals, education have been completed. Son Tereza Navarrete has verbalized understanding of Instructions/ plan. Son has been provided with the contact information in the event the need to make an appointment. Son knows when to call or seek emergency services. Son was provided AC contact information in the even further needs arise. Received education handouts and verbalized understanding. Denies any questions, concerns, additional needs. CC graduation completed. Ok ALBARRAN, RN  Ambulatory Care Manager  [email or web address]  [phone]

## 2023-10-11 ENCOUNTER — TELEPHONE (OUTPATIENT)
Dept: INTERNAL MEDICINE CLINIC | Age: 88
End: 2023-10-11

## 2023-10-11 NOTE — TELEPHONE ENCOUNTER
Isiah Rhoades with [facility] calling stating he went and saw the pt today and the pressure ulcer and it is healing well. Stated though he does now have one on his right ankle. He stated the son is keeping it clean and covered and Isiah Rhoades thinks that is ok for now but if MD wants other orders can let him know.

## 2023-10-11 NOTE — TELEPHONE ENCOUNTER
What stage is the ulcer? Good to hear about the other pressure ulcer. Does he need ankle boots to offloading pressures in his ankles?

## 2023-10-12 NOTE — TELEPHONE ENCOUNTER
[phone] (home)   Called spoke with [facility] ,he will check with his brother and call back if boot is needed.

## 2024-02-06 ENCOUNTER — TELEPHONE (OUTPATIENT)
Dept: INTERNAL MEDICINE CLINIC | Age: 89
End: 2024-02-06

## 2024-02-06 NOTE — TELEPHONE ENCOUNTER
[facility] would like orders UA C and S culture sensitivity as Pt's son said Pt is showing signs of UTI.    Please call and advise    Ph. [phone]  Oswaldo  (said he can take verbal over the phone)

## 2024-02-07 LAB
BACTERIA, URINE: ABNORMAL /HPF
BILIRUBIN, URINE: NEGATIVE
CLARITY: CLEAR
COLOR: YELLOW
EPITHELIAL CELLS, UA: ABNORMAL /HPF
ERYTHROCYTES URINE: ABNORMAL /HPF
GLUCOSE URINE: NEGATIVE
HB: SOURCE: ABNORMAL
KETONES, URINE: NEGATIVE
LEUKOCYTE ESTERASE, URINE: ABNORMAL
LEUKOCYTES, UA: ABNORMAL /HPF
NITRITE, URINE: POSITIVE
OTHER: ABNORMAL
PH, URINE: 5.5 (ref 5–8)
PROTEIN, URINE: NEGATIVE
RBC URINE: NEGATIVE
SPECIFIC GRAVITY UA: 1.01 (ref 1–1.03)
URINE TYPE: ABNORMAL
UROBILINOGEN, URINE: NORMAL MG/DL

## 2024-02-07 RX ORDER — LEVOTHYROXINE SODIUM 0.05 MG/1
50 TABLET ORAL DAILY
Qty: 90 TABLET | Refills: 1 | Status: SHIPPED | OUTPATIENT
Start: 2024-02-07 | End: 2024-05-07

## 2024-02-07 NOTE — TELEPHONE ENCOUNTER
Pts son is requesting a refill on medication below:          levothyroxine (SYNTHROID) 50 MCG tablet [[id number]     [facility] [id number] - [place], [place] - [facility] [address] - P [phone] - F [phone]

## 2024-02-22 ENCOUNTER — TELEPHONE (OUTPATIENT)
Dept: INTERNAL MEDICINE CLINIC | Age: 89
End: 2024-02-22

## 2024-02-22 DIAGNOSIS — N30.00 ACUTE CYSTITIS WITHOUT HEMATURIA: Primary | ICD-10-CM

## 2024-02-22 RX ORDER — CIPROFLOXACIN 250 MG/1
250 TABLET, FILM COATED ORAL 2 TIMES DAILY
Qty: 14 TABLET | Refills: 0 | Status: SHIPPED | OUTPATIENT
Start: 2024-02-22 | End: 2024-02-29

## 2024-02-22 NOTE — TELEPHONE ENCOUNTER
I ordered ciprofloxacin to [facility] pharmacy on [address] based on the susceptibility results.

## 2024-02-22 NOTE — TELEPHONE ENCOUNTER
Pt son is requesting the on call physician call and discuss the urinalysis results. Pt is aware about provider being out of town. Please call and advise [phone].

## 2024-02-22 NOTE — TELEPHONE ENCOUNTER
Narrative  Performed by: [facility]  Ziggy Sniderew ([id number])[facility] [address] [place] [address]    SPECIMEN DESCRIPTIONUrine, Clean Catch    SPECIMEN TYPEUrine    CULTURE RESULTSPositive Growth  ESCHERICHIA COLI  ADAMARIS TROPICALIS ISOLATED  KLEBSIELLA PNEUMONIAE  (NOTE)  >100,000 CFU/mL Escherichia coli  20,000 CFU/mL Candida tropicalis  No further workup.  10,000 CFU/mL Klebsiella pneumoniae    REPORT [id number]  FINAL REPORT    ORGANISMESCHERICHIA COLI    METHODMIC    AMPICILLIN<=8  SUSCEPTIBLE    AMPICILLIN SULBACT<=4/2  SUSCEPTIBLE    AZTREONAM<=4  SUSCEPTIBLE    CIPROFLOXACIN<=0.25  SUSCEPTIBLE    ERTAPENEM<=0.5  SUSCEPTIBLE    GENTAMICIN<=2  SUSCEPTIBLE    IMIPENEM<=1  SUSCEPTIBLE    LEVOFLOXACIN<=0.5  SUSCEPTIBLE    MEROPENEM<=1  SUSCEPTIBLE    NITROFURANTOIN<=32  SUSCEPTIBLE    PIP TAZO<=8  SUSCEPTIBLE    TETRACYCLINE<=4  SUSCEPTIBLE    TOBRAMYCIN<=2  SUSCEPTIBLE    AMOXICILLIN CLAVULAN<=8/4  SUSCEPTIBLE    CEFAZOLIN<=2  SUSCEPTIBLE    CEFOXITIN<=8  SUSCEPTIBLE    TRIMETHOPRIM/SULFAMETHOXAXOLE<=0.5/9.5  SUSCEPTIBLE    ORGANISMKLEBSIELLA PNEUMONIAE    METHODMIC    AMPICILLIN SULBACT8/4  SUSCEPTIBLE    AZTREONAM<=4  SUSCEPTIBLE    CIPROFLOXACIN<=0.25  SUSCEPTIBLE    ERTAPENEM<=0.5  SUSCEPTIBLE    GENTAMICIN<=2  SUSCEPTIBLE    IMIPENEM<=1  SUSCEPTIBLE    LEVOFLOXACIN<=0.5  SUSCEPTIBLE    MEROPENEM<=1  SUSCEPTIBLE    [id number]  INTERMEDIATE    PIP TAZO<=8  SUSCEPTIBLE    TETRACYCLINE<=4  SUSCEPTIBLE    TOBRAMYCIN<=2  SUSCEPTIBLE    AMOXICILLIN CLAVULAN<=8/4  SUSCEPTIBLE    CEFAZOLIN<=2  SUSCEPTIBLE    CEFOXITIN<=8  SUSCEPTIBLE    TRIMETHOPRIM/SULFAMETHOXAXOLE<=0.5/9.5  SUSCEPTIBLE      Specimen Collected: 02/07/24 10:25 EST Last Resulted: 02/07/24 10:25 EST

## 2024-02-29 ENCOUNTER — TELEPHONE (OUTPATIENT)
Dept: INTERNAL MEDICINE CLINIC | Age: 89
End: 2024-02-29

## 2024-02-29 NOTE — TELEPHONE ENCOUNTER
IS THIS [facility]:      [phone] (home)   Left message on machine  For Oswaldo

## 2024-02-29 NOTE — TELEPHONE ENCOUNTER
Oswaldo from [facility] Pt's son has not been giving the Cipro for UTI due to pt acting fine. Oswaldo tried to stress the importance of taking the med. Son was not very receptive.    Pt has been recert for monthly catheter change     Oswaldo  Ph. [phone]    Please call and advise

## 2024-03-12 ENCOUNTER — HOSPITAL ENCOUNTER (INPATIENT)
Age: 89
LOS: 4 days | Discharge: NURSING, REHAB OR LONG-TERM CARE | End: 2024-03-16
Attending: EMERGENCY MEDICINE | Admitting: HOSPITALIST
Payer: MEDICARE

## 2024-03-12 ENCOUNTER — APPOINTMENT (OUTPATIENT)
Dept: CT IMAGING | Age: 89
End: 2024-03-12
Payer: MEDICARE

## 2024-03-12 DIAGNOSIS — N30.00 ACUTE CYSTITIS WITHOUT HEMATURIA: Primary | ICD-10-CM

## 2024-03-12 DIAGNOSIS — N17.9 AKI (ACUTE KIDNEY INJURY) (HCC): ICD-10-CM

## 2024-03-12 LAB
AMORPH SED URNS QL MICRO: ABNORMAL /HPF
ANION GAP SERPL CALCULATED.3IONS-SCNC: 15 MMOL/L (ref 3–16)
BACTERIA URNS QL MICRO: ABNORMAL /HPF
BASOPHILS # BLD: 0 K/UL (ref 0–0.2)
BASOPHILS NFR BLD: 0 %
BILIRUB UR QL STRIP.AUTO: NEGATIVE
BUN SERPL-MCNC: 63 MG/DL (ref 7–20)
CALCIUM SERPL-MCNC: 8.9 MG/DL (ref 8.3–10.6)
CHLORIDE SERPL-SCNC: 100 MMOL/L (ref 99–110)
CLARITY UR: ABNORMAL
CO2 SERPL-SCNC: 19 MMOL/L (ref 21–32)
COLOR UR: YELLOW
CREAT SERPL-MCNC: 2.2 MG/DL (ref 0.8–1.3)
DEPRECATED RDW RBC AUTO: 14.5 % (ref 12.4–15.4)
EOSINOPHIL # BLD: 0.6 K/UL (ref 0–0.6)
EOSINOPHIL NFR BLD: 3 %
GFR SERPLBLD CREATININE-BSD FMLA CKD-EPI: 26 ML/MIN/{1.73_M2}
GLUCOSE SERPL-MCNC: 121 MG/DL (ref 70–99)
GLUCOSE UR STRIP.AUTO-MCNC: NEGATIVE MG/DL
HCT VFR BLD AUTO: 31.2 % (ref 40.5–52.5)
HGB BLD-MCNC: 10.2 G/DL (ref 13.5–17.5)
HGB UR QL STRIP.AUTO: ABNORMAL
KETONES UR STRIP.AUTO-MCNC: NEGATIVE MG/DL
LEUKOCYTE ESTERASE UR QL STRIP.AUTO: ABNORMAL
LYMPHOCYTES # BLD: 12.9 K/UL (ref 1–5.1)
LYMPHOCYTES NFR BLD: 70 %
MAGNESIUM SERPL-MCNC: 1.7 MG/DL (ref 1.8–2.4)
MCH RBC QN AUTO: 31.8 PG (ref 26–34)
MCHC RBC AUTO-ENTMCNC: 32.6 G/DL (ref 31–36)
MCV RBC AUTO: 97.5 FL (ref 80–100)
MONOCYTES # BLD: 0.4 K/UL (ref 0–1.3)
MONOCYTES NFR BLD: 2 %
NEUTROPHILS # BLD: 4.6 K/UL (ref 1.7–7.7)
NEUTROPHILS NFR BLD: 25 %
NITRITE UR QL STRIP.AUTO: NEGATIVE
OVALOCYTES BLD QL SMEAR: ABNORMAL
PH UR STRIP.AUTO: 6 [PH] (ref 5–8)
PLATELET # BLD AUTO: 136 K/UL (ref 135–450)
PMV BLD AUTO: 9.6 FL (ref 5–10.5)
POTASSIUM SERPL-SCNC: 5.1 MMOL/L (ref 3.5–5.1)
PROT UR STRIP.AUTO-MCNC: NEGATIVE MG/DL
RBC # BLD AUTO: 3.2 M/UL (ref 4.2–5.9)
RBC #/AREA URNS HPF: ABNORMAL /HPF (ref 0–4)
SODIUM SERPL-SCNC: 134 MMOL/L (ref 136–145)
SP GR UR STRIP.AUTO: 1.01 (ref 1–1.03)
UA DIPSTICK W REFLEX MICRO PNL UR: YES
URN SPEC COLLECT METH UR: ABNORMAL
UROBILINOGEN UR STRIP-ACNC: 0.2 E.U./DL
WBC # BLD AUTO: 18.4 K/UL (ref 4–11)
WBC #/AREA URNS HPF: ABNORMAL /HPF (ref 0–5)
YEAST URNS QL MICRO: PRESENT /HPF

## 2024-03-12 PROCEDURE — 6360000002 HC RX W HCPCS: Performed by: EMERGENCY MEDICINE

## 2024-03-12 PROCEDURE — 1200000000 HC SEMI PRIVATE

## 2024-03-12 PROCEDURE — 2580000003 HC RX 258: Performed by: EMERGENCY MEDICINE

## 2024-03-12 PROCEDURE — 81001 URINALYSIS AUTO W/SCOPE: CPT

## 2024-03-12 PROCEDURE — 6370000000 HC RX 637 (ALT 250 FOR IP)

## 2024-03-12 PROCEDURE — 83735 ASSAY OF MAGNESIUM: CPT

## 2024-03-12 PROCEDURE — 85025 COMPLETE CBC W/AUTO DIFF WBC: CPT

## 2024-03-12 PROCEDURE — 6360000002 HC RX W HCPCS

## 2024-03-12 PROCEDURE — 99223 1ST HOSP IP/OBS HIGH 75: CPT | Performed by: HOSPITALIST

## 2024-03-12 PROCEDURE — 51702 INSERT TEMP BLADDER CATH: CPT

## 2024-03-12 PROCEDURE — 99285 EMERGENCY DEPT VISIT HI MDM: CPT

## 2024-03-12 PROCEDURE — 74176 CT ABD & PELVIS W/O CONTRAST: CPT

## 2024-03-12 PROCEDURE — 92507 TX SP LANG VOICE COMM INDIV: CPT

## 2024-03-12 PROCEDURE — 36415 COLL VENOUS BLD VENIPUNCTURE: CPT

## 2024-03-12 PROCEDURE — 80048 BASIC METABOLIC PNL TOTAL CA: CPT

## 2024-03-12 PROCEDURE — 2580000003 HC RX 258

## 2024-03-12 PROCEDURE — 92526 ORAL FUNCTION THERAPY: CPT

## 2024-03-12 RX ORDER — SODIUM CHLORIDE 0.9 % (FLUSH) 0.9 %
5-40 SYRINGE (ML) INJECTION PRN
Status: DISCONTINUED | OUTPATIENT
Start: 2024-03-12 | End: 2024-03-12

## 2024-03-12 RX ORDER — PANTOPRAZOLE SODIUM 40 MG/1
40 TABLET, DELAYED RELEASE ORAL 2 TIMES DAILY
Status: DISCONTINUED | OUTPATIENT
Start: 2024-03-12 | End: 2024-03-16 | Stop reason: HOSPADM

## 2024-03-12 RX ORDER — VITAMIN B COMPLEX
1 CAPSULE ORAL DAILY
Status: DISCONTINUED | OUTPATIENT
Start: 2024-03-12 | End: 2024-03-16 | Stop reason: HOSPADM

## 2024-03-12 RX ORDER — SODIUM CHLORIDE 9 MG/ML
INJECTION, SOLUTION INTRAVENOUS PRN
Status: DISCONTINUED | OUTPATIENT
Start: 2024-03-12 | End: 2024-03-12

## 2024-03-12 RX ORDER — ENOXAPARIN SODIUM 100 MG/ML
30 INJECTION SUBCUTANEOUS DAILY
Status: DISCONTINUED | OUTPATIENT
Start: 2024-03-12 | End: 2024-03-12

## 2024-03-12 RX ORDER — LEVOTHYROXINE SODIUM 0.05 MG/1
50 TABLET ORAL DAILY
Status: DISCONTINUED | OUTPATIENT
Start: 2024-03-12 | End: 2024-03-16 | Stop reason: HOSPADM

## 2024-03-12 RX ORDER — ASPIRIN 81 MG/1
81 TABLET, CHEWABLE ORAL DAILY
Status: DISCONTINUED | OUTPATIENT
Start: 2024-03-12 | End: 2024-03-16 | Stop reason: HOSPADM

## 2024-03-12 RX ORDER — ACETAMINOPHEN 325 MG/1
650 TABLET ORAL EVERY 6 HOURS PRN
Status: DISCONTINUED | OUTPATIENT
Start: 2024-03-12 | End: 2024-03-12

## 2024-03-12 RX ORDER — SODIUM CHLORIDE 0.9 % (FLUSH) 0.9 %
5-40 SYRINGE (ML) INJECTION EVERY 12 HOURS SCHEDULED
Status: DISCONTINUED | OUTPATIENT
Start: 2024-03-12 | End: 2024-03-12

## 2024-03-12 RX ORDER — MAGNESIUM SULFATE IN WATER 40 MG/ML
2000 INJECTION, SOLUTION INTRAVENOUS ONCE
Status: COMPLETED | OUTPATIENT
Start: 2024-03-12 | End: 2024-03-12

## 2024-03-12 RX ORDER — POLYETHYLENE GLYCOL 3350 17 G/17G
17 POWDER, FOR SOLUTION ORAL DAILY PRN
Status: DISCONTINUED | OUTPATIENT
Start: 2024-03-12 | End: 2024-03-16 | Stop reason: HOSPADM

## 2024-03-12 RX ORDER — ONDANSETRON 2 MG/ML
4 INJECTION INTRAMUSCULAR; INTRAVENOUS EVERY 6 HOURS PRN
Status: DISCONTINUED | OUTPATIENT
Start: 2024-03-12 | End: 2024-03-12

## 2024-03-12 RX ORDER — SODIUM CHLORIDE 9 MG/ML
INJECTION, SOLUTION INTRAVENOUS CONTINUOUS
Status: DISCONTINUED | OUTPATIENT
Start: 2024-03-12 | End: 2024-03-12

## 2024-03-12 RX ORDER — ATORVASTATIN CALCIUM 80 MG/1
80 TABLET, FILM COATED ORAL NIGHTLY
Status: DISCONTINUED | OUTPATIENT
Start: 2024-03-12 | End: 2024-03-16 | Stop reason: HOSPADM

## 2024-03-12 RX ORDER — FINASTERIDE 5 MG/1
5 TABLET, FILM COATED ORAL DAILY
Status: DISCONTINUED | OUTPATIENT
Start: 2024-03-12 | End: 2024-03-16 | Stop reason: HOSPADM

## 2024-03-12 RX ORDER — SODIUM CHLORIDE, SODIUM LACTATE, POTASSIUM CHLORIDE, AND CALCIUM CHLORIDE .6; .31; .03; .02 G/100ML; G/100ML; G/100ML; G/100ML
500 INJECTION, SOLUTION INTRAVENOUS ONCE
Status: COMPLETED | OUTPATIENT
Start: 2024-03-12 | End: 2024-03-12

## 2024-03-12 RX ORDER — SODIUM CHLORIDE 9 MG/ML
INJECTION, SOLUTION INTRAVENOUS CONTINUOUS
Status: ACTIVE | OUTPATIENT
Start: 2024-03-12 | End: 2024-03-12

## 2024-03-12 RX ORDER — ONDANSETRON 4 MG/1
4 TABLET, ORALLY DISINTEGRATING ORAL EVERY 8 HOURS PRN
Status: DISCONTINUED | OUTPATIENT
Start: 2024-03-12 | End: 2024-03-12

## 2024-03-12 RX ORDER — ACETAMINOPHEN 650 MG/1
650 SUPPOSITORY RECTAL EVERY 6 HOURS PRN
Status: DISCONTINUED | OUTPATIENT
Start: 2024-03-12 | End: 2024-03-12

## 2024-03-12 RX ADMIN — ATORVASTATIN CALCIUM 80 MG: 80 TABLET, FILM COATED ORAL at 20:53

## 2024-03-12 RX ADMIN — ASPIRIN 81 MG: 81 TABLET, CHEWABLE ORAL at 12:30

## 2024-03-12 RX ADMIN — LEVOTHYROXINE SODIUM 50 MCG: 50 TABLET ORAL at 12:31

## 2024-03-12 RX ADMIN — PANTOPRAZOLE SODIUM 40 MG: 40 TABLET, DELAYED RELEASE ORAL at 20:53

## 2024-03-12 RX ADMIN — SODIUM CHLORIDE: 9 INJECTION, SOLUTION INTRAVENOUS at 10:41

## 2024-03-12 RX ADMIN — VITAMIN B COMPLEX 1 CAPSULE: at 12:30

## 2024-03-12 RX ADMIN — MAGNESIUM SULFATE HEPTAHYDRATE 2000 MG: 40 INJECTION, SOLUTION INTRAVENOUS at 12:33

## 2024-03-12 RX ADMIN — CEFTRIAXONE 1000 MG: 1 INJECTION, POWDER, FOR SOLUTION INTRAMUSCULAR; INTRAVENOUS at 07:37

## 2024-03-12 RX ADMIN — FINASTERIDE 5 MG: 5 TABLET, FILM COATED ORAL at 12:30

## 2024-03-12 RX ADMIN — PANTOPRAZOLE SODIUM 40 MG: 40 TABLET, DELAYED RELEASE ORAL at 12:30

## 2024-03-12 RX ADMIN — SODIUM CHLORIDE, POTASSIUM CHLORIDE, SODIUM LACTATE AND CALCIUM CHLORIDE 500 ML: 600; 310; 30; 20 INJECTION, SOLUTION INTRAVENOUS at 08:34

## 2024-03-12 ASSESSMENT — PAIN - FUNCTIONAL ASSESSMENT: PAIN_FUNCTIONAL_ASSESSMENT: NONE - DENIES PAIN

## 2024-03-12 NOTE — ED PROVIDER NOTES
THE [facility]  EMERGENCY DEPARTMENT ENCOUNTER          ATTENDING PHYSICIAN NOTE       Date of evaluation: 3/12/2024    Chief Complaint     Urinary Catheter      History of Present Illness     Tai Snow is a 89 y.o. male who presents with concerns for a catheter malfunction.  Patient states that he has had that catheter for over a year although it does get changed every month.  States that there has been nothing draining into the bag for at least a few days.  He has noted urine in his diaper.  Does report some abdominal pain this evening. No fevers or chills.     Son who lives with the patient arrived and stated that the catheter just stopped draining yesterday.  He was concerned because he states that his father is normally bedbound and had tried to get out of bed yesterday and again today.  Has not noted any vomiting from the patient, although he has noted that the patient has had some diarrhea.  No blood noted in the stool.    ASSESSMENT / PLAN  (MEDICAL DECISION MAKING)     INITIAL VITALS: BP: (!) 129/53, Temp: 98.9 °F (37.2 °C), Pulse: 72, Respirations: 18, SpO2: 100 %      Tai Snow is a 89 y.o. male who scented due to urinary catheter malfunction.  The nurse was able to remove the old Chambers and place a new catheter without incident.  It did drain around 200 mL of clear yellow fluid.  Urinalysis does reveal concerns for infection and patient is noted to have a white count.  He has no tachycardia hypotension or fever to suggest systemic illness or sepsis.  Patient is pending electrolytes at this time with reports of diarrhea and possible changes in behavior.  At this time care is being signed out to the oncoming physician who will follow-up and make final decisions regarding disposition.    Is this patient to be included in the SEP-1 core measure? No Exclusion criteria - the patient is NOT to be included for SEP-1 Core Measure due to: 2+ SIRS criteria are not met    Medical Decision 
Urine LARGE (A) Negative    Microscopic Examination YES     Urine Type NotGiven     WBC, UA 21-50 (A) 0 - 5 /HPF    RBC, UA 3-4 0 - 4 /HPF    Bacteria, UA 3+ (A) None Seen /HPF    Amorphous, UA 1+ /HPF    Yeast, UA Present (A) None Seen /HPF   BMP w/ Reflex to MG   Result Value Ref Range    Sodium 134 (L) 136 - 145 mmol/L    Potassium reflex Magnesium 5.1 3.5 - 5.1 mmol/L    Chloride 100 99 - 110 mmol/L    CO2 19 (L) 21 - 32 mmol/L    Anion Gap 15 3 - 16    Glucose 121 (H) 70 - 99 mg/dL    BUN 63 (H) 7 - 20 mg/dL    Creatinine 2.2 (H) 0.8 - 1.3 mg/dL    Est, Glom Filt Rate 26 (A) >60    Calcium 8.9 8.3 - 10.6 mg/dL   CBC with Auto Differential   Result Value Ref Range    WBC 18.4 (H) 4.0 - 11.0 K/uL    RBC 3.20 (L) 4.20 - 5.90 M/uL    Hemoglobin 10.2 (L) 13.5 - 17.5 g/dL    Hematocrit 31.2 (L) 40.5 - 52.5 %    MCV 97.5 80.0 - 100.0 fL    MCH 31.8 26.0 - 34.0 pg    MCHC 32.6 31.0 - 36.0 g/dL    RDW 14.5 12.4 - 15.4 %    Platelets 136 135 - 450 K/uL    MPV 9.6 5.0 - 10.5 fL    Neutrophils % 25.0 %    Lymphocytes % 70.0 %    Monocytes % 2.0 %    Eosinophils % 3.0 %    Basophils % 0.0 %    Neutrophils Absolute 4.6 1.7 - 7.7 K/uL    Lymphocytes Absolute 12.9 (H) 1.0 - 5.1 K/uL    Monocytes Absolute 0.4 0.0 - 1.3 K/uL    Eosinophils Absolute 0.6 0.0 - 0.6 K/uL    Basophils Absolute 0.0 0.0 - 0.2 K/uL    Ovalocytes 1+ (A)      EKG     MOST RECENT VITALS:  BP: (!) 118/48, Temp: 98.9 °F (37.2 °C), Pulse: 75, Respirations: 18, SpO2: 100 %     Procedures         ED Course          The patient was given the following medications:  Orders Placed This Encounter   Medications    cefTRIAXone (ROCEPHIN) 1,000 mg in sodium chloride 0.9 % 50 mL IVPB (mini-bag)     Order Specific Question:   Antimicrobial Indications     Answer:   Urinary Tract Infection    lactated ringers bolus 500 mL       CONSULTS:  IP CONSULT TO PRIMARY CARE PROVIDER      Maxime Berg MD  03/12/24 0755

## 2024-03-12 NOTE — H&P
Internal Medicine  PGY 1  History & Physical      CC Gonzales malfunction    History Obtained From:  patient    HISTORY OF PRESENT ILLNESS:  89 yo M PMH BPH with h/o obstruction & chronic indwelling gonzales, CAD s/p SANDER to LCX and RCA (2008), LBBB, left hip fracture s/p fixation, HTN, HFpEF presents to [facility] with cc gonzales malfunction    Patient lives with son, HPI is from patient and son.   Per son, patient has had an indwelling catheter for the past 3 years. Gonzales gets changed once a month by a visiting nurse at home. Pt with a hospital visit in the past year d/t passage of blood which the son attributed to gonzales insertion by visiting nurse. He mentions that they asked for a different visiting nurse since.     Per pt for the past 2-3 weeks, he has had burning on urination. Gonzales changed last around same time. Son mentions that pt had an episode of watery stool around that time.   Pt also c/o abd pain & 1 watery stool, diarrhea 2 days ago.  Per patient's son, the catheter was not draining properly (some urine draining from around the gonzales) since yesterday and no drainage today. This prompted pt's son to bring him to the hospital.    Pt denied any fever/chills/n/v/abd pain/cough/sob/HA/cp.  The dysuria was resolved after gonzales change in the ED.    They [facility], no abnormal labs per patient's son.     Early Feb a urine sample taken by the visiting nurse was sent to PCP office and based on the results, cipro was prescribed on 02/22/24.    ED course:  Vitals on presentation  BP: 129/53, Temp: 98.9 °F (37.2 °C), Pulse: 72, Respirations: 18, SpO2: 100 %       Labs showed  WBC 18.4, Hg 10.2, Cr 2.2  UA Macro large leukocyte esterase, negatve nitrites, UA micro 21-50 WB, 3 + bacteria    In the ED, gonzales was changed resulting in 200 cc UOP, clear yellow.   He received 1 g rocephin and 500 cc LR in the ED     He was admitted for treatment of MULU and UTI.     Past Medical History:        Diagnosis  results for input(s): \"BNP\" in the last 72 hours.  ABGs: No results for input(s): \"PHART\", \"FUY3OZG\", \"PO2ART\" in the last 72 hours.  INR: No results for input(s): \"INR\" in the last 72 hours.    U/A:  Recent Labs     03/12/24  0622   COLORU Yellow   PHUR 6.0   WBCUA 21-50*   RBCUA 3-4   YEAST Present*   BACTERIA 3+*   CLARITYU CLOUDY*   SPECGRAV 1.015   LEUKOCYTESUR LARGE*   UROBILINOGEN 0.2   BILIRUBINUR Negative   BLOODU TRACE-INTACT*   GLUCOSEU Negative   AMORPHOUS 1+       No orders to display           ASSESSMENT AND PLAN:  Mr Snow is a 89 yo M PMH BPH with h/o obstruction & chronic indwelling gonzales, CAD s/p SANDRE to LCX and RCA (2008), LBBB, left hip fracture s/p fixation, HTN, HFpEF presents to [facility] with cc gonzales malfunction    CAUTI  Indwelling catheter x 3 yrs  Leukocytosis WBC 18.4  S/p 1 g IV ceftriaxone  UA micro 21-50 WBC    02/07 UCx:   Pansensitive E coli, Klebsiella pneumoniae, Candida tropicalis  S/p 7 day course of Cipro 02/29/24   - UA/ Ucx  - Continue Ceftriaxone   - f/u cx and sensitivity and narrow abx   - repeat UA in 48 hours    MULU  Likely prerenal d/t hypovolemia vs post renal  Low liquid PO intake, 1 x watery stool 2 days prior to presentation  Cr 2.2 (baseline around 1.1)  S/p 0.5 L LR in ED  - hydration with continuous IVF  - avoid nephrotoxic agents    Hypomagnesemia  Mg 1.7  - 2 g IV Mg    Chronic problems    Chronic Anemia  Hg 10.2 (8-10s in the past yr), MCV 97.5   - B12  - folate    HTN  Currently   - Torsemide & Lisinopril held    CHF  08/21/20 Echo EF 50%  Wears compression stockings at home, has not worn them since 2 days prior to presentation  BLE pitting edema    BPH  Home med Proscar     CAD w/o angina  - ASA    Dysphasia   H/o achalasia/dysphasia  - protonix       Code Status: Limited x 4  FEN: dysphasia diet  PPX: SCD, lovenox  DISPO: IP    Behnam Enayataval, MD  3/12/2024,  7:33 AM      Will discuss with attending physician Dr. Genny MD

## 2024-03-12 NOTE — ED NOTES
Pt going to 6326 floor notified SBAR complete for nurse to review and pt will be up in 20 min     Tiffanie Smith, RN  03/12/24 0806

## 2024-03-12 NOTE — ED TRIAGE NOTES
Patient arrived in the ER with Urinary catheter problem. Patient states that the catheter is drain around the catheter.
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
## 2024-03-12 NOTE — PROGRESS NOTES
Vitals:    03/12/24 0900   BP: (!) 152/56   Pulse: 64   Resp: 18   Temp: 97.6 °F (36.4 °C)   SpO2: 99%     Admission: Patient received to room 6326 from ED. Patient admitted with Dx of MULU. Patient A&O upon arrival confused about why he's here. Chambers draining, no leaking noted at this time. Tele monitor applied, rate and rhythm verified with monitor reader. Admission assessment as charted. VSS. Patient oriented to room, staff, and call system. Educated on fall protocol and hourly rounding. Patient informed to utilize call light with any needs. Pt verbalized understanding. Will continue to monitor.

## 2024-03-12 NOTE — PLAN OF CARE
Problem: ABCDS Injury Assessment  Goal: Absence of physical injury  Outcome: Progressing     Problem: Safety - Adult  Goal: Free from fall injury  Outcome: Progressing   Patient at risk for falls. Patient resting quietly in bed. Side rails up x 2. Bed locked in lowest position. Bed alarm on. Bedside table and call light within reach. Patient instructed to call for assistance. Patient verbalized understanding. Will continue to monitor.

## 2024-03-12 NOTE — PROGRESS NOTES
4 Eyes Skin Assessment     NAME:  Tai Snow  YOB: 1925  MEDICAL RECORD NUMBER:  [id number]    The patient is being assessed for  Admission    I agree that at least one RN has performed a thorough Head to Toe Skin Assessment on the patient. ALL assessment sites listed below have been assessed.      Areas assessed by both nurses:    Head, Face, Ears, Shoulders, Back, Chest, Arms, Elbows, Hands, Sacrum. Buttock, Coccyx, Ischium, and Legs. Feet and Heels        Does the Patient have a Wound? No noted wound(s)       Eriberto Prevention initiated by RN: yes  Wound Care Orders initiated by RN: No    Pressure Injury (Stage 3,4, Unstageable, DTI, NWPT, and Complex wounds) if present, place Wound referral order by RN under : No    New Ostomies, if present place, Ostomy referral order under : No     Nurse 1 eSignature: Electronically signed by Deb Thorne RN on 3/12/24 at 4:35 PM EDT    **SHARE this note so that the co-signing nurse can place an eSignature**    Nurse 2 eSignature: Electronically signed by Adrianna Mohr RN on 3/12/24 at 7:22 PM EDT

## 2024-03-12 NOTE — ED NOTES
ED TO INPATIENT SBAR HANDOFF    Patient Name: Tai Snow   :  1925  89 y.o.   MRN:  [id number]  Preferred Name    ED Room #:  A06/A06-06  Family/Caregiver Present yes   Restraints no   Sitter no   Sepsis Risk Score Sepsis Risk Score: 2.18    Situation  Code Status: Prior No additional code details.    Allergies: Patient has no known allergies.  Weight: Patient Vitals for the past 96 hrs (Last 3 readings):   Weight   24 0529 81.5 kg (179 lb 11.2 oz)     Arrived from: home  Chief Complaint:   Chief Complaint   Patient presents with    Urinary Catheter     Hospital Problem/Diagnosis:  Principal Problem:    Acute kidney injury (HCC)  Resolved Problems:    * No resolved hospital problems. *    Imaging:   No orders to display     Abnormal labs:   Abnormal Labs Reviewed   URINALYSIS WITH MICROSCOPIC - Abnormal; Notable for the following components:       Result Value    Clarity, UA CLOUDY (*)     Blood, Urine TRACE-INTACT (*)     Leukocyte Esterase, Urine LARGE (*)     WBC, UA 21-50 (*)     Bacteria, UA 3+ (*)     Yeast, UA Present (*)     All other components within normal limits   BASIC METABOLIC PANEL W/ REFLEX TO MG FOR LOW K - Abnormal; Notable for the following components:    Sodium 134 (*)     CO2 19 (*)     Glucose 121 (*)     BUN 63 (*)     Creatinine 2.2 (*)     Est, Glom Filt Rate 26 (*)     All other components within normal limits   CBC WITH AUTO DIFFERENTIAL - Abnormal; Notable for the following components:    WBC 18.4 (*)     RBC 3.20 (*)     Hemoglobin 10.2 (*)     Hematocrit 31.2 (*)     Lymphocytes Absolute 12.9 (*)     Ovalocytes 1+ (*)     All other components within normal limits     Critical values: no     Abnormal Assessment Findings:     Background  History:   Past Medical History:   Diagnosis Date    Achalasia     Arthritis     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     [facility]    CAD (coronary artery disease)     stents x 3 - dr han    Cervical spondylosis      Chronic heart failure with normal ejection fraction (HCC)     diastolic    DVT (deep venous thrombosis) (HCC) 2003    right leg    GERD (gastroesophageal reflux disease)     Hemorrhoids     Hyperlipidemia     Hypertension     PUD (peptic ulcer disease)     Sciatica     War injury due to shrapnel     leg       Assessment    Vitals/MEWS: MEWS Score: 1  Level of Consciousness: Alert (0)   Vitals:    03/12/24 0529 03/12/24 0630 03/12/24 0700 03/12/24 0740   BP: (!) 129/53  (!) 118/48 (!) 145/56   Pulse: 72  75 63   Resp: 18  18 17   Temp: 98.9 °F (37.2 °C)   98.1 °F (36.7 °C)   TempSrc: Oral   Oral   SpO2: 100% 92% 100% 100%   Weight: 81.5 kg (179 lb 11.2 oz)      Height: 1.803 m (5' 11\")        FiO2 (%): room air  O2 Flow Rate: O2 Device: None (Room air)    Cardiac Rhythm:    Pain Assessment:  [x] Verbal [] Barton Tao Scale  Pain Scale: Pain Assessment  Pain Assessment: None - Denies Pain  Last documented pain score (0-10 scale)    Last documented pain medication administered: none  Mental Status: oriented  Orientation Level:    NIH Score:    C-SSRS: Risk of Suicide: No Risk  Bedside swallow:    Dewitt Coma Scale (GCS): Dewitt Coma Scale  Eye Opening: Spontaneous  Best Verbal Response: Oriented  Best Motor Response: Obeys commands  Mark Anthony Coma Scale Score: 15  Active LDA's:   Peripheral IV 03/12/24 Right Forearm (Active)     PO Status: Regular  Pertinent or High Risk Medications/Drips: no   If Yes, please provide details:   Pending Blood Product Administration: no       You may also review the ED PT Care Timeline found under the Summary Nursing Index tab.    Recommendation    Pending orders   Plan for Discharge (if known):   Additional Comments: came in from home lives with his son to ED because catheter was not draining replaced in ED where pt found to have a UTI is [place] and wears hearing aids   If any further questions, please call Sending RN at [phone]    Electronically signed by: Electronically signed by Tiffanie

## 2024-03-13 ENCOUNTER — APPOINTMENT (OUTPATIENT)
Dept: ULTRASOUND IMAGING | Age: 89
End: 2024-03-13
Payer: MEDICARE

## 2024-03-13 LAB
ALBUMIN SERPL-MCNC: 3.3 G/DL (ref 3.4–5)
ANION GAP SERPL CALCULATED.3IONS-SCNC: 11 MMOL/L (ref 3–16)
BASOPHILS # BLD: 0 K/UL (ref 0–0.2)
BASOPHILS NFR BLD: 0.2 %
BUN SERPL-MCNC: 46 MG/DL (ref 7–20)
BURR CELLS BLD QL SMEAR: ABNORMAL
CALCIUM SERPL-MCNC: 8.6 MG/DL (ref 8.3–10.6)
CHLORIDE SERPL-SCNC: 105 MMOL/L (ref 99–110)
CO2 SERPL-SCNC: 22 MMOL/L (ref 21–32)
CREAT SERPL-MCNC: 1.6 MG/DL (ref 0.8–1.3)
DEPRECATED RDW RBC AUTO: 14.1 % (ref 12.4–15.4)
EOSINOPHIL # BLD: 0.2 K/UL (ref 0–0.6)
EOSINOPHIL NFR BLD: 1.1 %
GFR SERPLBLD CREATININE-BSD FMLA CKD-EPI: 38 ML/MIN/{1.73_M2}
GLUCOSE SERPL-MCNC: 98 MG/DL (ref 70–99)
HCT VFR BLD AUTO: 29 % (ref 40.5–52.5)
HGB BLD-MCNC: 9.3 G/DL (ref 13.5–17.5)
LYMPHOCYTES # BLD: 12.9 K/UL (ref 1–5.1)
LYMPHOCYTES NFR BLD: 80.3 %
MAGNESIUM SERPL-MCNC: 2.1 MG/DL (ref 1.8–2.4)
MCH RBC QN AUTO: 31.6 PG (ref 26–34)
MCHC RBC AUTO-ENTMCNC: 32.1 G/DL (ref 31–36)
MCV RBC AUTO: 98.3 FL (ref 80–100)
MONOCYTES # BLD: 0.4 K/UL (ref 0–1.3)
MONOCYTES NFR BLD: 2.5 %
NEUTROPHILS # BLD: 2.6 K/UL (ref 1.7–7.7)
NEUTROPHILS NFR BLD: 15.9 %
OVALOCYTES BLD QL SMEAR: ABNORMAL
PHOSPHATE SERPL-MCNC: 3.5 MG/DL (ref 2.5–4.9)
PLATELET # BLD AUTO: 125 K/UL (ref 135–450)
PMV BLD AUTO: 9.8 FL (ref 5–10.5)
POTASSIUM SERPL-SCNC: 4.3 MMOL/L (ref 3.5–5.1)
RBC # BLD AUTO: 2.95 M/UL (ref 4.2–5.9)
SCHISTOCYTES BLD QL SMEAR: ABNORMAL
SLIDE REVIEW: ABNORMAL
SODIUM SERPL-SCNC: 138 MMOL/L (ref 136–145)
WBC # BLD AUTO: 16.1 K/UL (ref 4–11)

## 2024-03-13 PROCEDURE — 97530 THERAPEUTIC ACTIVITIES: CPT

## 2024-03-13 PROCEDURE — 85025 COMPLETE CBC W/AUTO DIFF WBC: CPT

## 2024-03-13 PROCEDURE — 87086 URINE CULTURE/COLONY COUNT: CPT

## 2024-03-13 PROCEDURE — 2580000003 HC RX 258

## 2024-03-13 PROCEDURE — 6370000000 HC RX 637 (ALT 250 FOR IP)

## 2024-03-13 PROCEDURE — 97535 SELF CARE MNGMENT TRAINING: CPT

## 2024-03-13 PROCEDURE — 83735 ASSAY OF MAGNESIUM: CPT

## 2024-03-13 PROCEDURE — 87106 FUNGI IDENTIFICATION YEAST: CPT

## 2024-03-13 PROCEDURE — 80069 RENAL FUNCTION PANEL: CPT

## 2024-03-13 PROCEDURE — 92526 ORAL FUNCTION THERAPY: CPT

## 2024-03-13 PROCEDURE — 99233 SBSQ HOSP IP/OBS HIGH 50: CPT | Performed by: HOSPITALIST

## 2024-03-13 PROCEDURE — 1200000000 HC SEMI PRIVATE

## 2024-03-13 PROCEDURE — 36415 COLL VENOUS BLD VENIPUNCTURE: CPT

## 2024-03-13 PROCEDURE — 97116 GAIT TRAINING THERAPY: CPT

## 2024-03-13 PROCEDURE — 6360000002 HC RX W HCPCS

## 2024-03-13 PROCEDURE — 97167 OT EVAL HIGH COMPLEX 60 MIN: CPT

## 2024-03-13 PROCEDURE — 76770 US EXAM ABDO BACK WALL COMP: CPT

## 2024-03-13 PROCEDURE — 97162 PT EVAL MOD COMPLEX 30 MIN: CPT

## 2024-03-13 RX ORDER — 0.9 % SODIUM CHLORIDE 0.9 %
1000 INTRAVENOUS SOLUTION INTRAVENOUS ONCE
Status: COMPLETED | OUTPATIENT
Start: 2024-03-13 | End: 2024-03-13

## 2024-03-13 RX ADMIN — PANTOPRAZOLE SODIUM 40 MG: 40 TABLET, DELAYED RELEASE ORAL at 22:06

## 2024-03-13 RX ADMIN — CEFTRIAXONE 1000 MG: 1 INJECTION, POWDER, FOR SOLUTION INTRAMUSCULAR; INTRAVENOUS at 06:36

## 2024-03-13 RX ADMIN — ATORVASTATIN CALCIUM 80 MG: 80 TABLET, FILM COATED ORAL at 22:06

## 2024-03-13 RX ADMIN — PANTOPRAZOLE SODIUM 40 MG: 40 TABLET, DELAYED RELEASE ORAL at 10:07

## 2024-03-13 RX ADMIN — LEVOTHYROXINE SODIUM 50 MCG: 50 TABLET ORAL at 06:34

## 2024-03-13 RX ADMIN — VITAMIN B COMPLEX 1 CAPSULE: at 10:07

## 2024-03-13 RX ADMIN — ASPIRIN 81 MG: 81 TABLET, CHEWABLE ORAL at 10:07

## 2024-03-13 RX ADMIN — SODIUM CHLORIDE 1000 ML: 9 INJECTION, SOLUTION INTRAVENOUS at 10:33

## 2024-03-13 RX ADMIN — FINASTERIDE 5 MG: 5 TABLET, FILM COATED ORAL at 10:07

## 2024-03-13 NOTE — PROGRESS NOTES
edema of the LEs  P: Can restart home compression stockings under SCDs    # CAD  A: Asymptomatic  P: Home ASA    # Dysphagia  A: H/o achalasia. Tolerating some PO  P: Defer to SLP reccs, pureed diet w/ thin liquids    Code Status: Limited  FEN: ADULT DIET; Dysphagia - Pureed; No Added Salt (3-4 gm); Kosher; Kosher diet   PPX: SCDs, Lovenox  DISPO: Tbd    Maranda Mays, MS3  03/13/24  10:17 AM    Dann Kenny MD  PGY-1, Internal Medicine    This patient has been staffed and discussed with Trinity Royal MD.

## 2024-03-13 NOTE — PROGRESS NOTES
Speech Language Pathology  Facility/Department:[address] TELEMETRY   Therapy Note    Name: Tai Snow  : 1925  MRN: [id number]                                                         Patient Diagnosis(es):   Patient Active Problem List    Diagnosis Date Noted    Indwelling Chambers catheter present 2022    Hypokalemia 2022    Gastroesophageal reflux disease with esophagitis without hemorrhage 2022    Acute kidney injury (HCC) 2024    Acute cystitis without hematuria 2024    Physical deconditioning 2023    Hyperglycemia 2022    Urinary retention 2020    Oral phase dysphagia 2020    Achalasia     Cervical spondylosis     MULU (acute kidney injury) ([facility])     Chronic diastolic CHF (congestive heart failure) ([facility])     Bilateral lower extremity edema     Closed left hip fracture, initial encounter ([facility]) 2020    Ataxia 2016    Bilateral carotid artery disease ([facility]) 2016    Benign essential HTN     Acquired hypothyroidism 2014    Vitamin D deficiency 10/14/2013    Anemia, unspecified 10/14/2013    Mixed hyperlipidemia     CAD (coronary artery disease)     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 symptoms     Arthritis        Past Medical History:   Diagnosis Date    Achalasia     Arthritis     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 [facility]     [facility]    CAD (coronary artery disease)     stents x 3 - dr han    Cervical spondylosis     Chronic heart failure with normal ejection fraction ([facility])     diastolic    DVT (deep venous thrombosis) ([facility])     right leg    GERD (gastroesophageal reflux disease)     Hemorrhoids     Hyperlipidemia     Hypertension     PUD (peptic ulcer disease)     Sciatica     War injury due to shrapnel     leg     Past Surgical History:   Procedure Laterality Date    APPENDECTOMY      [facility]ACT [facility]AL [facility]    CORONARY ANGIOPLASTY WITH STENT PLACEMENT      CYSTOSCOPY N/A

## 2024-03-13 NOTE — PROGRESS NOTES
Physical Therapy  Facility/Department: [address]  Physical Therapy Initial Assessment / Treatment    Name: Tai Snow  : 1925  MRN: [id number]  Date of Service: 3/13/2024    Discharge Recommendations:  Subacute/Skilled Nursing Facility   PT Equipment Recommendations  Equipment Needed: No  Other: defer      Patient Diagnosis(es): The primary encounter diagnosis was Acute cystitis without hematuria. A diagnosis of MULU (acute kidney injury) (HCC) was also pertinent to this visit.  Past Medical History:  has a past medical history of Achalasia, Arthritis, 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 symptoms, CAD (coronary artery disease), Cervical spondylosis, Chronic heart failure with normal ejection fraction (HCC), DVT (deep venous thrombosis) (HCC), GERD (gastroesophageal reflux disease), Hemorrhoids, Hyperlipidemia, Hypertension, PUD (peptic ulcer disease), Sciatica, and War injury due to shrapnel.  Past Surgical History:  has a past surgical history that includes Cataract removal (Bilateral); Coronary angioplasty with stent; Hemorrhoid surgery; Appendectomy; hernia repair; repair retinal tear/hole (Left); hip surgery (Left, 2020); Upper gastrointestinal endoscopy (2018); Cystoscopy (N/A, 2020); Upper gastrointestinal endoscopy (N/A, 2020); Upper gastrointestinal endoscopy (N/A, 2020); Upper gastrointestinal endoscopy (N/A, 2021); and Upper gastrointestinal endoscopy (N/A, 2023).    Assessment   Body Structures, Functions, Activity Limitations Requiring Skilled Therapeutic Intervention: Decreased functional mobility ;Decreased strength;Decreased endurance;Decreased balance  Assessment: Pt is a 89 yo male who presented to ED 3/12 with MULU.  Currently living with son and recieves partial assistance from HHA or otherwise sons for transfers, dressing, and overall mobility.  Today pt is functioning below reported baseline and is requiring CGA to min-modAx2 for  from a chair using your arms?: Total  How much help is needed walking in hospital room?: Total  How much help is needed climbing 3-5 steps with a railing?: Total  AM-PAC Inpatient Mobility Raw Score : 7  AM-PAC Inpatient T-Scale Score : 26.42  Mobility Inpatient CMS 0-100% Score: 92.36  Mobility Inpatient CMS G-Code Modifier : CM            Goals  Short Term Goals  Time Frame for Short Term Goals: discharge  Short Term Goal 1: Pt will demo 5 min of dynamic sitting balance EOB with CGA  Short Term Goal 2: Pt will transfer sit <> stand from EOB and chair with RW and maxAx1  Short Term Goal 3: Pt will demo supine to sit with maxAx1  Patient Goals   Patient Goals : return back home with son       Education  Patient Education  Education Given To: Patient  Education Provided: Role of Therapy;Plan of Care;Transfer Training  Education Provided Comments: PT rec following d/c, chair push-up for brief change  Education Method: Verbal;Demonstration  Barriers to Learning: Hearing (fear of falling)  Education Outcome: Demonstrated understanding;Verbalized understanding      Therapy Time   Individual Concurrent Group Co-treatment   Time In 0825         Time Out 0950         Minutes 85               Timed Code Treatment Minutes:   70     Total Treatment Minutes:   85     Penelope Quintanilla, SPT

## 2024-03-13 NOTE — PROGRESS NOTES
Internal Medicine Progress Note    Date: 3/13/2024   Patient: Tai GARCIA [facility] Day: 1      CC: Urinary Catheter       Interval Hx   Patient was seen at bedside in the AM. No AEON.      HPI:   \"89 yo M PMH BPH with h/o obstruction & chronic indwelling gonzales, CAD s/p SANDER to LCX and RCA (2008), LBBB, left hip fracture s/p fixation, HTN, HFpEF presents to [facility] with cc gonzales malfunction     Patient lives with son, HPI is from patient and son.   Per son, patient has had an indwelling catheter for the past 3 years. Gonzales gets changed once a month by a visiting nurse at home. Pt with a hospital visit in the past year d/t passage of blood which the son attributed to gonzaels insertion by visiting nurse. He mentions that they asked for a different visiting nurse since.      Per pt for the past 2-3 weeks, he has had burning on urination. Gonzales changed last around same time. Son mentions that pt had an episode of watery stool around that time.   Pt also c/o abd pain & 1 watery stool, diarrhea 2 days ago.  Per patient's son, the catheter was not draining properly (some urine draining from around the gonzales) since yesterday and no drainage today. This prompted pt's son to bring him to the hospital.     Pt denied any fever/chills/n/v/abd pain/cough/sob/HA/cp.  The dysuria was resolved after gonzales change in the ED.     They [facility], no abnormal labs per patient's son.      Early Feb a urine sample taken by the visiting nurse was sent to PCP office and based on the results, cipro was prescribed on 02/22/24.     ED course:  Vitals on presentation  BP: 129/53, Temp: 98.9 °F (37.2 °C), Pulse: 72, Respirations: 18, SpO2: 100 %        Labs showed  WBC 18.4, Hg 10.2, Cr 2.2  UA Macro large leukocyte esterase, negatve nitrites, UA micro 21-50 WB, 3 + bacteria     In the ED, gonzales was changed resulting in 200 cc UOP, clear yellow.   He received 1 g rocephin and 500 cc LR in the ED      He was

## 2024-03-13 NOTE — PLAN OF CARE
Problem: Discharge Planning  Goal: Discharge to home or other facility with appropriate resources  Outcome: Progressing     Problem: Safety - Adult  Goal: Free from fall injury  Outcome: Progressing   Pt provided gripper socks, and educated on using the call light for assistance ambulating. Exit alarm on bed is on with bed in lowest position with the wheels locked.     Problem: Skin/Tissue Integrity  Goal: Absence of new skin breakdown  Description: 1.  Monitor for areas of redness and/or skin breakdown  2.  Assess vascular access sites hourly  3.  Every 4-6 hours minimum:  Change oxygen saturation probe site  4.  Every 4-6 hours:  If on nasal continuous positive airway pressure, respiratory therapy assess nares and determine need for appliance change or resting period.  Outcome: Progressing

## 2024-03-13 NOTE — PROGRESS NOTES
deficits  Initiation: Requires cues for some  Sequencing: Requires cues for some  Orientation  Overall Orientation Status: Within Functional Limits  Orientation Level: Oriented to person                    Education Given To: Patient  Education Provided: Role of Therapy;Plan of Care;ADL Adaptive Strategies;Transfer Training  Education Method: Verbal  Barriers to Learning: Hearing  Education Outcome: Continued education needed                 Pt seen by OT for eval and treat. Treatment included: bed mobility, unsupported sitting, functional transfers, ADL, pt education                                                      AM-PAC - ADL  AM-PAC Daily Activity - Inpatient   How much help is needed for putting on and taking off regular lower body clothing?: Total  How much help is needed for bathing (which includes washing, rinsing, drying)?: Total  How much help is needed for toileting (which includes using toilet, bedpan, or urinal)?: Total  How much help is needed for putting on and taking off regular upper body clothing?: A Little  How much help is needed for taking care of personal grooming?: A Little  How much help for eating meals?: None  AM-[facility] Inpatient Daily Activity Raw Score: 13  AM-PAC Inpatient ADL T-Scale Score : 32.03  ADL Inpatient CMS 0-100% Score: 63.03  ADL Inpatient CMS G-Code Modifier : CL    Goals  Short Term Goals  Time Frame for Short Term Goals: Discharge  Short Term Goal 1: improve supine to sit to ModA  Short Term Goal 2: sit to stand transfers w/ [place] of 2  Short Term Goal 3: grooming task at EOB w/ SBA for sitting balance  Short Term Goal 4: chair pushups x 5 w/ clearance of bottom from chair each time to improve functional strength for transfers  Patient Goals   Patient goals : to return home       Therapy Time   Individual Concurrent Group Co-treatment   Time In 0825         Time Out 0950         Minutes 85          Timed Code Treatment Minutes:   70    Total Treatment Minutes:  85          Irma Rayo OTR/L #[id number]

## 2024-03-13 NOTE — PLAN OF CARE
Problem: Discharge Planning  Goal: Discharge to home or other facility with appropriate resources  Outcome: Progressing     Problem: Safety - Adult  Goal: Free from fall injury  3/12/2024 2145 by Altagracia Mcbride RN  Outcome: Progressing  3/12/2024 1803 by Deb Thorne RN  Outcome: Progressing     Problem: ABCDS Injury Assessment  Goal: Absence of physical injury  3/12/2024 2145 by Altagracia Mcbride RN  Outcome: Progressing  3/12/2024 1803 by Deb Thorne RN  Outcome: Progressing

## 2024-03-13 NOTE — PROGRESS NOTES
Speech-Language Pathology  Attempt Note    0852: SLP attempted to see this patient for dysphagia f/u this AM. Pt currently working with PT/OT.  Will re-attempt as pt able and as schedule allows.         Anali Greenfield MA, CCC-SLP  [phone]  Speech-Language Pathologist

## 2024-03-13 NOTE — PROGRESS NOTES
Speech-Language Pathology    Attempted therapy follow-up, however pt being seen by ultrasound. Will try to check back later, schedule permitting.    Fiordaliza Card, SLP, [id number]  Ph. # [phone]

## 2024-03-14 LAB
ALBUMIN SERPL-MCNC: 3.3 G/DL (ref 3.4–5)
ANION GAP SERPL CALCULATED.3IONS-SCNC: 11 MMOL/L (ref 3–16)
BACTERIA UR CULT: ABNORMAL
BASOPHILS # BLD: 0 K/UL (ref 0–0.2)
BASOPHILS NFR BLD: 0 %
BUN SERPL-MCNC: 35 MG/DL (ref 7–20)
CALCIUM SERPL-MCNC: 8.7 MG/DL (ref 8.3–10.6)
CHLORIDE SERPL-SCNC: 108 MMOL/L (ref 99–110)
CO2 SERPL-SCNC: 21 MMOL/L (ref 21–32)
CREAT SERPL-MCNC: 1.3 MG/DL (ref 0.8–1.3)
DEPRECATED RDW RBC AUTO: 14.3 % (ref 12.4–15.4)
EOSINOPHIL # BLD: 0 K/UL (ref 0–0.6)
EOSINOPHIL NFR BLD: 0 %
GFR SERPLBLD CREATININE-BSD FMLA CKD-EPI: 49 ML/MIN/{1.73_M2}
GLUCOSE SERPL-MCNC: 97 MG/DL (ref 70–99)
HCT VFR BLD AUTO: 27.5 % (ref 40.5–52.5)
HGB BLD-MCNC: 9.4 G/DL (ref 13.5–17.5)
LYMPHOCYTES # BLD: 12.4 K/UL (ref 1–5.1)
LYMPHOCYTES NFR BLD: 76 %
MAGNESIUM SERPL-MCNC: 2.1 MG/DL (ref 1.8–2.4)
MCH RBC QN AUTO: 31.9 PG (ref 26–34)
MCHC RBC AUTO-ENTMCNC: 34 G/DL (ref 31–36)
MCV RBC AUTO: 94 FL (ref 80–100)
MONOCYTES # BLD: 0.5 K/UL (ref 0–1.3)
MONOCYTES NFR BLD: 3 %
NEUTROPHILS # BLD: 3.4 K/UL (ref 1.7–7.7)
NEUTROPHILS NFR BLD: 21 %
ORGANISM: ABNORMAL
PHOSPHATE SERPL-MCNC: 3.2 MG/DL (ref 2.5–4.9)
PLATELET # BLD AUTO: 126 K/UL (ref 135–450)
PLATELET BLD QL SMEAR: ABNORMAL
PMV BLD AUTO: 9.3 FL (ref 5–10.5)
POTASSIUM SERPL-SCNC: 4 MMOL/L (ref 3.5–5.1)
RBC # BLD AUTO: 2.93 M/UL (ref 4.2–5.9)
RBC MORPH BLD: NORMAL
SLIDE REVIEW: ABNORMAL
SODIUM SERPL-SCNC: 140 MMOL/L (ref 136–145)
WBC # BLD AUTO: 16.3 K/UL (ref 4–11)

## 2024-03-14 PROCEDURE — 6370000000 HC RX 637 (ALT 250 FOR IP)

## 2024-03-14 PROCEDURE — 2580000003 HC RX 258

## 2024-03-14 PROCEDURE — 1200000000 HC SEMI PRIVATE

## 2024-03-14 PROCEDURE — 92526 ORAL FUNCTION THERAPY: CPT

## 2024-03-14 PROCEDURE — 85025 COMPLETE CBC W/AUTO DIFF WBC: CPT

## 2024-03-14 PROCEDURE — 51702 INSERT TEMP BLADDER CATH: CPT

## 2024-03-14 PROCEDURE — 97530 THERAPEUTIC ACTIVITIES: CPT

## 2024-03-14 PROCEDURE — 99233 SBSQ HOSP IP/OBS HIGH 50: CPT | Performed by: HOSPITALIST

## 2024-03-14 PROCEDURE — 83735 ASSAY OF MAGNESIUM: CPT

## 2024-03-14 PROCEDURE — 80069 RENAL FUNCTION PANEL: CPT

## 2024-03-14 PROCEDURE — 36415 COLL VENOUS BLD VENIPUNCTURE: CPT

## 2024-03-14 PROCEDURE — 6360000002 HC RX W HCPCS

## 2024-03-14 RX ORDER — FLUCONAZOLE 200 MG/1
200 TABLET ORAL DAILY
Status: DISCONTINUED | OUTPATIENT
Start: 2024-03-14 | End: 2024-03-16 | Stop reason: HOSPADM

## 2024-03-14 RX ADMIN — VITAMIN B COMPLEX 1 CAPSULE: at 08:29

## 2024-03-14 RX ADMIN — ASPIRIN 81 MG: 81 TABLET, CHEWABLE ORAL at 08:30

## 2024-03-14 RX ADMIN — PANTOPRAZOLE SODIUM 40 MG: 40 TABLET, DELAYED RELEASE ORAL at 20:15

## 2024-03-14 RX ADMIN — PANTOPRAZOLE SODIUM 40 MG: 40 TABLET, DELAYED RELEASE ORAL at 08:30

## 2024-03-14 RX ADMIN — ATORVASTATIN CALCIUM 80 MG: 80 TABLET, FILM COATED ORAL at 20:15

## 2024-03-14 RX ADMIN — LEVOTHYROXINE SODIUM 50 MCG: 50 TABLET ORAL at 06:57

## 2024-03-14 RX ADMIN — FINASTERIDE 5 MG: 5 TABLET, FILM COATED ORAL at 08:30

## 2024-03-14 RX ADMIN — CEFTRIAXONE 1000 MG: 1 INJECTION, POWDER, FOR SOLUTION INTRAMUSCULAR; INTRAVENOUS at 06:54

## 2024-03-14 RX ADMIN — FLUCONAZOLE 200 MG: 200 TABLET ORAL at 17:57

## 2024-03-14 ASSESSMENT — ENCOUNTER SYMPTOMS
ABDOMINAL DISTENTION: 0
ABDOMINAL PAIN: 1
DIARRHEA: 1

## 2024-03-14 NOTE — PROGRESS NOTES
Speech Language Pathology  Facility/Department:[address] TELEMETRY  Dysphagia Therapy Note  Discharge     Name: Tai Snow  : 1925  MRN: [id number]                                                         Patient Diagnosis(es):   Patient Active Problem List    Diagnosis Date Noted    Indwelling Chambers catheter present 2022    Hypokalemia 2022    Gastroesophageal reflux disease with esophagitis without hemorrhage 2022    Acute kidney injury (HCC) 2024    Acute cystitis without hematuria 2024    Physical deconditioning 2023    Hyperglycemia 2022    Urinary retention 2020    Oral phase dysphagia 2020    Achalasia     Cervical spondylosis     MULU (acute kidney injury) ([facility])     Chronic diastolic CHF (congestive heart failure) ([facility])     Bilateral lower extremity edema     Closed left hip fracture, initial encounter ([facility]) 2020    Ataxia 2016    Bilateral carotid artery disease ([facility]) 2016    Benign essential HTN     Acquired hypothyroidism 2014    Vitamin D deficiency 10/14/2013    Anemia, unspecified 10/14/2013    Mixed hyperlipidemia     CAD (coronary artery disease)     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 symptoms     Arthritis        Past Medical History:   Diagnosis Date    Achalasia     Arthritis     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 [facility]     [facility]    CAD (coronary artery disease)     stents x 3 - dr han    Cervical spondylosis     Chronic heart failure with normal ejection fraction ([facility])     diastolic    DVT (deep venous thrombosis) ([facility])     right leg    GERD (gastroesophageal reflux disease)     Hemorrhoids     Hyperlipidemia     Hypertension     PUD (peptic ulcer disease)     Sciatica     War injury due to shrapnel     leg     Past Surgical History:   Procedure Laterality Date    APPENDECTOMY      [facility]AL [facility]     [facility]    CORONARY ANGIOPLASTY WITH STENT PLACEMENT

## 2024-03-14 NOTE — PROGRESS NOTES
Occupational Therapy  Facility/Department: [address]  Occupational Therapy Progress Note    Name: Tai Snow  : 1925  MRN: [id number]  Date of Service: 3/14/2024    Discharge Recommendations:  Subacute/Skilled Nursing Facility          Patient Diagnosis(es): The primary encounter diagnosis was Acute cystitis without hematuria. A diagnosis of MULU (acute kidney injury) (HCC) was also pertinent to this visit.      Treatment Diagnosis: impaired ADLs/transfers      Assessment   Performance deficits / Impairments: Decreased functional mobility ;Decreased ADL status;Decreased endurance;Decreased balance;Decreased posture  Assessment: Requiring increased assist w/ bed mobility and transfers today. Pt more sleepy. Son present at bedside and expressing concerns for pt's sleepiness today - nurse indicating no new meds, but pt sleeping poorly the night before 2* maintenance in room late. Today, pt required dependent assist of 2/3 for functional transfers. Sitting and standing balance impaired. Pt is functioning well-below his baseline. Recommend SNF at discharge. Should pt discharge home, recommend 24 hr A, HHOT, hospital bed, dimitri. Continue per POC.  Treatment Diagnosis: impaired ADLs/transfers  Activity Tolerance  Activity Tolerance: Treatment limited secondary to decreased cognition;Patient limited by fatigue  Activity Tolerance Comments: pt sleepy        Plan   Occupational Therapy Plan  Times Per Week: 2-5  Current Treatment Recommendations: Strengthening, Balance training, Functional mobility training, Endurance training, Safety education & training, Patient/Caregiver education & training, Equipment evaluation, education, & procurement, Self-Care / ADL     Restrictions  Position Activity Restriction  Other position/activity restrictions: up with assistance; son reports pt wears a brace on each knee    Subjective   General  Chart Reviewed: Yes  Patient assessed for rehabilitation services?:  Yes  Additional Pertinent Hx: 89 y.o. M who is MULU with UTI from chronic indwelling Gonzales.     Hospital Course: CT Abd/Pelvis: (-).    PMH: BPH with h/o obstruction & chronic indwelling gonzales, CAD s/p SANDER to LCX and RCA (2008), LBBB, left hip fracture s/p fixation, HTN, HFpEF  Family / Caregiver Present: Yes (son (that pt lives w/))  Referring Practitioner: Enayataval, Behnam, MD; Jj Camarillo MD  Diagnosis: MULU    Subjective  Subjective: In bed on arrival. Son at bedside. Pt expressing he is sleepy. Son expressing concerns that pt is altered and wanted to know if pt was medicated to cause sleepiness. Nurse reporting no new meds, but did state maintenance in pt's room late last night.    c/o chronic bilateral knee pain - not rated    Social/Functional History  Social/Functional History  Lives With: Son (has 24 hr A)  Type of Home: Apartment  Home Layout: One level  Home Access: Ramped entrance  Bathroom Shower/Tub: Tub/Shower unit  Bathroom Toilet: Standard  Bathroom Equipment: Bathtub lift, Grab bars in shower, Hand-held shower, Tub transfer bench  Bathroom Accessibility: Accessible, Wheelchair accessible  Home Equipment: Walker, rolling, Grab bars, Alert Button, Cane, Hospital bed, Wheelchair-manual, Lift chair  Has the patient had two or more falls in the past year or any fall with injury in the past year?: No  Receives Help From: Personal care attendant, Family (HHA x 1 hr daily, otherwise sons present)  ADL Assistance: Needs assistance (assist w/ LB dressing, showers, and toileting; reports BM incontinence - has chronic gonzales catheter)  Toileting:  (HHA assist to bathroom, toileting schedule, gonzales assist 24/7)  Homemaking Assistance: Needs assistance (food, laundry, and cleaning provided for him)  Ambulation Assistance: Needs assistance (gait belt, person assisting, RW and B knee brace for morning ambulation, intermittent use of WC to bathroom - family pushes)  Transfer Assistance: Needs assistance (son

## 2024-03-14 NOTE — PROGRESS NOTES
Progress Note  Internal Medicine - MEDICAL STUDENT    Admit Date: 3/12/2024  Day: 2  Diet: ADULT DIET; Dysphagia - Soft and Bite Sized; No Added Salt (3-4 gm); Kosher; Kosher diet    CC: Gonzales Malfunction; Dysuria    Interval history:   NAEO. Good urine output. No diarrhea. Ucx still pending, collected yesterday. R renal US was negative for lesion noted on CT. Seen by PT/OT who recommended additional PT at SNF but pt declined and wants to return home. D/c dependent on Ucx with tailored Abx and normalization of Cr.     HPI:  \"89 yo M PMH BPH with h/o obstruction & chronic indwelling gonzales, CAD s/p SANDER to LCX and RCA (2008), LBBB, left hip fracture s/p fixation, HTN, HFpEF presents to [facility] with cc gonzales malfunction.     Patient lives with son, HPI is from patient and son. Per son, patient has had an indwelling catheter for the past 3 years. Gonzaels gets changed once a month by a visiting nurse at home. Pt with a hospital visit in the past year d/t passage of blood which the son attributed to gonzales insertion by visiting nurse. He mentions that they asked for a different visiting nurse since.      Per pt for the past 2-3 weeks, he has had burning on urination. Gonzales changed last around same time. Son mentions that pt had an episode of watery stool around that time. Pt also c/o abd pain & 1 watery stool, diarrhea 2 days ago. Per patient's son, the catheter was not draining properly (some urine draining from around the gonzales) since yesterday and no drainage today. This prompted pt's son to bring him to the hospital.     Pt denied any fever/chills/n/v/abd pain/cough/sob/HA/cp. The dysuria was resolved after gonzales change in the ED. They mentioned that [facility], no abnormal labs per patient's son. Early Feb a urine sample taken by the visiting nurse was sent to PCP office and based on the results, cipro was prescribed on 02/22/24.\"    Medications:     Scheduled Meds:   aspirin  81 mg Oral Daily    atorvastatin  Bilateral hand tremor.    LABS:    CBC:   Recent Labs     03/12/24  0622 03/13/24  0753 03/14/24  0715   WBC 18.4* 16.1* 16.3*   HGB 10.2* 9.3* 9.4*   HCT 31.2* 29.0* 27.5*    125* 126*   MCV 97.5 98.3 94.0     Renal:    Recent Labs     03/12/24 0622 03/13/24 0753 03/14/24  0715   * 138 140   K 5.1 4.3 4.0    105 108   CO2 19* 22 21   BUN 63* 46* 35*   CREATININE 2.2* 1.6* 1.3   GLUCOSE 121* 98 97   CALCIUM 8.9 8.6 8.7   MG 1.70* 2.10 2.10   PHOS  --  3.5 3.2   ANIONGAP 15 11 11     Hepatic:   Recent Labs     03/13/24 0753 03/14/24 0715   LABALBU 3.3* 3.3*     -----------------------------------------------------------------  RAD:   US RENAL COMPLETE   Final Result   IMPRESSION :      Nonvisualization of the right renal lesion previously identified.      Additional simple appearing renal cysts which do not require dedicated imaging   follow-up.         CT ABDOMEN PELVIS WO CONTRAST Additional Contrast? None   Final Result   1.  No acute process in the abdomen or pelvis. The bladder is decompressed by a   Gonzales catheter.   2.  Interval atrophy of the kidneys bilaterally. Mild interval increase in size   of a hyperdense right renal lesion. Consider nonemergent ultrasound for further   evaluation.   3.  Multiple incidental findings again noted, including a partially visualized   right chest wall mass and a gastric lipoma.             Assessment/Plan:     # Catheter-Associated UTI  A: 89 yo M PMH BPH with 3 yr hx of indwelling gonzales catheter for h/o obstruction. S/p 7d Cipro on 2/29/24. 2/7/24 Ucx + for E Coli, Klebsiella. Leukocytosis improving with IV Ceftriaxone. R renal US on 3/14/24 negative for lesion noted on CT. Hemodynamically stable, afebrile.     P:  - Ucx pending, plan to narrow Abx once back if still needed (i.e., organism not covered by Cipro)   - On day 10 of Abx (7d Cipro, 3d CTX)  - Tentative d/c home tomorrow (w/ home health care coordinated)   - No need for additional f/u on R

## 2024-03-14 NOTE — PLAN OF CARE
Problem: Discharge Planning  Goal: Discharge to home or other facility with appropriate resources  Outcome: Progressing     Problem: Safety - Adult  Goal: Free from fall injury  Outcome: Progressing   Patient using non skid footwear and the bed remains in the lowest position with the wheels locked and exit alarm armed. Pt educated on use of the call light and calls out appropriately.

## 2024-03-14 NOTE — PROGRESS NOTES
present at bedside.  Upon waking, pt was agreeable to PT but very lethargic.  Son reported that his dad \"seems out of it and is typically not this tired\".  Son states he has his dad complete daily morning and evening AROM exercises. RN notified and stated pt did not receive any new medication.          Social/Functional History  Social/Functional History  Lives With: Son (has 24 hr A)  Type of Home: Apartment  Home Layout: One level  Home Access: Ramped entrance  Bathroom Shower/Tub: Tub/Shower unit  Bathroom Toilet: Standard  Bathroom Equipment: Bathtub lift, Grab bars in shower, Hand-held shower, Tub transfer bench  Bathroom Accessibility: Accessible, Wheelchair accessible  Home Equipment: Walker, rolling, Grab bars, Alert Button, Cane, Hospital bed, Wheelchair-manual, Lift chair  Has the patient had two or more falls in the past year or any fall with injury in the past year?: No  Receives Help From: Personal care attendant, Family (HHA x 1 hr daily, otherwise sons present)  ADL Assistance: Needs assistance (assist w/ LB dressing, showers, and toileting; reports BM incontinence - has chronic gonzales catheter)  Toileting:  (HHA assist to bathroom, toileting schedule, gonzales assist 24/7)  Homemaking Assistance: Needs assistance (food, laundry, and cleaning provided for him)  Ambulation Assistance: Needs assistance (gait belt, person assisting, RW and B knee brace for morning ambulation, intermittent use of WC to bathroom - family pushes)  Transfer Assistance: Needs assistance (son reports assist of 1/2 for transfers)  Active : No  Additional Comments: gonzales cath use at all times  Vision/Hearing       Cognition   Orientation  Overall Orientation Status: Impaired (pt very lethargic with closing eyes and delayed responses)  Cognition  Overall Cognitive Status: Exceptions  Arousal/Alertness: Delayed responses to stimuli  Following Commands: Follows one step commands with repetition;Follows one step commands with

## 2024-03-14 NOTE — PLAN OF CARE
Problem: Safety - Adult  Goal: Free from fall injury  3/14/2024 0107 by Jimena Bonilla, RN  Outcome: Progressing     Problem: ABCDS Injury Assessment  Goal: Absence of physical injury  3/14/2024 0107 by Jimena Bonilla, RN  Outcome: Progressing

## 2024-03-14 NOTE — CARE COORDINATION
Case Management Assessment  Initial Evaluation    Date/Time of Evaluation: 3/14/2024 5:36 PM  Assessment Completed by: Davy Wasserman RN    If patient is discharged prior to next notation, then this note serves as note for discharge by case management.    Patient Name: Tai Snow                   YOB: 1925  Diagnosis: MULU (acute kidney injury) (HCC) [N17.9]  Acute kidney injury (HCC) [N17.9]  Acute cystitis without hematuria [N30.00]                   Date / Time: 3/12/2024  5:20 AM    Patient Admission Status: Inpatient   Readmission Risk (Low < 19, Mod (19-27), High > 27): Readmission Risk Score: 13.6    Current PCP: Jaimie Flores MD  PCP verified by CM? Yes (Jaimie Flores MD)    Chart Reviewed: Yes      History Provided by: Child/Family (gildardo Huerta (pt lives with Bradley))  Patient Orientation: Unable to Assess (pt sleeping during discussion with gildardo Huerta at bedside)    Patient Cognition: Alert    Hospitalization in the last 30 days (Readmission):  No    If yes, Readmission Assessment in CM Navigator will be completed.       Advance Directives:      Code Status: Limited   Patient's Primary Decision Maker is: Legal Next of Kin    Primary Decision Maker: Zaheer Snow - Child - [phone]    Secondary Decision Maker: Alexey Snow - Child - [phone]    Supplemental (Other) Decision Maker: TerrencekorinChay - Child - [phone]    Copy present: No     Discharge Planning:    Patient lives with: Children  Type of Home: Apartment (Condo with son- NO STEPS)  Primary Care Giver: Family  Patient Support Systems include: Children, Home Care Staff     Current Financial resources: Medicare    Current community resources: None    Currently ACTIVE with [place] on Aging: No    Current services prior to admission: Home Care, Durable Medical Equipment            Current DME: Walker, Wheelchair            Type of Home Care services:  Nursing Services (Monthly for catheter exchanges)    Home  Care Information  Currently ACTIVE with Home Health Care: Yes  Home Care Agency:  [email or web address]  Phone: .  Fax: .    ADLS  Prior functional level: Assistance with the following:, Bathing, Dressing, Mobility, Shopping, Housework, Cooking, Toileting  Current functional level: Assistance with the following:, Mobility, Bathing, Dressing, Toileting, Cooking, Housework, Shopping    PT AM-PAC: 6 /24  OT AM-PAC: 9 /24    Family can provide assistance at DC: Yes  Would you like Case Management to discuss the discharge plan with any other family members/significant others, and if so, who? No    Plans to Return to Present Housing: Yes  Other Identified Issues/Barriers to RETURNING to current housing: none    Potential Assistance needed at discharge: Home Care            Potential DME:      Durable Medical Equipment  DME Provider: has prior to admission  Equipment: wheelchair and walker    Home Oxygen and Respiratory Equipment  Has HOME OXYGEN prior to admission: No    Oxygen Company: Not Applicable    Patient expects to discharge to: Apartment/ Condo  Plan for transportation at discharge: Family    Dialysis  Active with HD/PD prior to admission: No       Financial    Payor: MEDICARE / Plan: MEDICARE PART A AND B / Product Type: *No Product type* /     Does insurance require precert for SNF: No    Potential assistance Purchasing Medications: No  Meds-to-Beds request: Yes      [facility] [id number] - [place], [place] - [address] - P [phone] - F [phone]  [address] [facility] [address]  Phone: [phone] Fax: [phone]      Factors facilitating achievement of predicted outcomes: Family support, Cooperative, Pleasant, Has needed Durable Medical Equipment at home, and Home is wheelchair accessible    Barriers to discharge: Medication managment    Additional Case Management Notes: CM spoke with patients son Bradley at bedside, patient sleeping soundly today during conversation. Patients sons provide 24 hour assistance

## 2024-03-15 LAB
ALBUMIN SERPL-MCNC: 3.2 G/DL (ref 3.4–5)
ANION GAP SERPL CALCULATED.3IONS-SCNC: 8 MMOL/L (ref 3–16)
ANISOCYTOSIS BLD QL SMEAR: ABNORMAL
BASOPHILS # BLD: 0 K/UL (ref 0–0.2)
BASOPHILS NFR BLD: 0 %
BUN SERPL-MCNC: 28 MG/DL (ref 7–20)
BURR CELLS BLD QL SMEAR: ABNORMAL
CALCIUM SERPL-MCNC: 8.3 MG/DL (ref 8.3–10.6)
CHLORIDE SERPL-SCNC: 108 MMOL/L (ref 99–110)
CO2 SERPL-SCNC: 24 MMOL/L (ref 21–32)
CREAT SERPL-MCNC: 1.3 MG/DL (ref 0.8–1.3)
DEPRECATED RDW RBC AUTO: 14.6 % (ref 12.4–15.4)
EOSINOPHIL # BLD: 0.3 K/UL (ref 0–0.6)
EOSINOPHIL NFR BLD: 2 %
GFR SERPLBLD CREATININE-BSD FMLA CKD-EPI: 49 ML/MIN/{1.73_M2}
GLUCOSE SERPL-MCNC: 107 MG/DL (ref 70–99)
HCT VFR BLD AUTO: 28.3 % (ref 40.5–52.5)
HGB BLD-MCNC: 9.4 G/DL (ref 13.5–17.5)
LYMPHOCYTES # BLD: 10.8 K/UL (ref 1–5.1)
LYMPHOCYTES NFR BLD: 77 %
MACROCYTES BLD QL SMEAR: ABNORMAL
MAGNESIUM SERPL-MCNC: 2.1 MG/DL (ref 1.8–2.4)
MCH RBC QN AUTO: 32.1 PG (ref 26–34)
MCHC RBC AUTO-ENTMCNC: 33.1 G/DL (ref 31–36)
MCV RBC AUTO: 96.7 FL (ref 80–100)
MICROCYTES BLD QL SMEAR: ABNORMAL
MONOCYTES # BLD: 0.6 K/UL (ref 0–1.3)
MONOCYTES NFR BLD: 4 %
NEUTROPHILS # BLD: 2.2 K/UL (ref 1.7–7.7)
NEUTROPHILS NFR BLD: 16 %
OVALOCYTES BLD QL SMEAR: ABNORMAL
PHOSPHATE SERPL-MCNC: 3.2 MG/DL (ref 2.5–4.9)
PLATELET # BLD AUTO: 118 K/UL (ref 135–450)
PLATELET BLD QL SMEAR: ABNORMAL
PMV BLD AUTO: 9.6 FL (ref 5–10.5)
POTASSIUM SERPL-SCNC: 4 MMOL/L (ref 3.5–5.1)
RBC # BLD AUTO: 2.92 M/UL (ref 4.2–5.9)
SCHISTOCYTES BLD QL SMEAR: ABNORMAL
SLIDE REVIEW: ABNORMAL
SODIUM SERPL-SCNC: 140 MMOL/L (ref 136–145)
VARIANT LYMPHS NFR BLD MANUAL: 1 % (ref 0–6)
WBC # BLD AUTO: 13.9 K/UL (ref 4–11)

## 2024-03-15 PROCEDURE — 80069 RENAL FUNCTION PANEL: CPT

## 2024-03-15 PROCEDURE — 99239 HOSP IP/OBS DSCHRG MGMT >30: CPT | Performed by: HOSPITALIST

## 2024-03-15 PROCEDURE — 83735 ASSAY OF MAGNESIUM: CPT

## 2024-03-15 PROCEDURE — 85025 COMPLETE CBC W/AUTO DIFF WBC: CPT

## 2024-03-15 PROCEDURE — 36415 COLL VENOUS BLD VENIPUNCTURE: CPT

## 2024-03-15 PROCEDURE — 6370000000 HC RX 637 (ALT 250 FOR IP)

## 2024-03-15 PROCEDURE — 1200000000 HC SEMI PRIVATE

## 2024-03-15 RX ADMIN — FLUCONAZOLE 200 MG: 200 TABLET ORAL at 09:36

## 2024-03-15 RX ADMIN — VITAMIN B COMPLEX 1 CAPSULE: at 09:36

## 2024-03-15 RX ADMIN — PANTOPRAZOLE SODIUM 40 MG: 40 TABLET, DELAYED RELEASE ORAL at 22:01

## 2024-03-15 RX ADMIN — LEVOTHYROXINE SODIUM 50 MCG: 50 TABLET ORAL at 06:22

## 2024-03-15 RX ADMIN — ASPIRIN 81 MG: 81 TABLET, CHEWABLE ORAL at 09:37

## 2024-03-15 RX ADMIN — ATORVASTATIN CALCIUM 80 MG: 80 TABLET, FILM COATED ORAL at 22:01

## 2024-03-15 RX ADMIN — FINASTERIDE 5 MG: 5 TABLET, FILM COATED ORAL at 09:36

## 2024-03-15 RX ADMIN — POLYETHYLENE GLYCOL 3350 17 G: 17 POWDER, FOR SOLUTION ORAL at 09:37

## 2024-03-15 RX ADMIN — PANTOPRAZOLE SODIUM 40 MG: 40 TABLET, DELAYED RELEASE ORAL at 09:37

## 2024-03-15 NOTE — CARE COORDINATION
CTN contacted intake with [facility] [phone]. They have accepted this patient, CTN has faxed orders to [phone]. They will contact patient and make arrangements for SOC by 3/17  Electronically signed by Gay Gillette LPN on 3/15/2024 at 11:15 AM

## 2024-03-15 NOTE — PLAN OF CARE
Problem: Safety - Adult  Goal: Free from fall injury  3/15/2024 0350 by Jimena Bonilla RN  Outcome: Progressing     Problem: Chronic Conditions and Co-morbidities  Goal: Patient's chronic conditions and co-morbidity symptoms are monitored and maintained or improved  Outcome: Progressing  Flowsheets (Taken 3/14/2024 1951)  Care Plan - Patient's Chronic Conditions and Co-Morbidity Symptoms are Monitored and Maintained or Improved: Monitor and assess patient's chronic conditions and comorbid symptoms for stability, deterioration, or improvement     Problem: Genitourinary - Adult  Goal: Urinary catheter remains patent  Outcome: Progressing

## 2024-03-15 NOTE — DISCHARGE SUMMARY
INTERNAL MEDICINE DEPARTMENT AT THE [facility]  DISCHARGE SUMMARY    Patient ID: Tai Snow                                             Discharge Date: 3/15/2024   Patient's PCP: Jaimie Flores MD                                          Discharge Physician: Dann Kenny MD MD  Admit Date: 3/12/2024   Admitting Physician: Trinity Royal MD    PROBLEMS DURING HOSPITALIZATION:  Present on Admission:   Acute kidney injury (HCC)   Acute cystitis without hematuria      HPI:  \"89 yo M PMH BPH with h/o obstruction & chronic indwelling gonzales, CAD s/p SANDER to LCX and RCA (2008), LBBB, left hip fracture s/p fixation, HTN, HFpEF presents to [facility] with cc gonzales malfunction.     Patient lives with son, HPI is from patient and son. Per son, patient has had an indwelling catheter for the past 3 years. Gonzales gets changed once a month by a visiting nurse at home. Pt with a hospital visit in the past year d/t passage of blood which the son attributed to gonzales insertion by visiting nurse. He mentions that they asked for a different visiting nurse since.      Per pt for the past 2-3 weeks, he has had burning on urination. Gonzales changed last around same time. Son mentions that pt had an episode of watery stool around that time. Pt also c/o abd pain & 1 watery stool, diarrhea 2 days ago. Per patient's son, the catheter was not draining properly (some urine draining from around the gonzales) since yesterday and no drainage today. This prompted pt's son to bring him to the hospital.     Pt denied any fever/chills/n/v/abd pain/cough/sob/HA/cp. The dysuria was resolved after gonzales change in the ED. They mentioned that [facility], no abnormal labs per patient's son. Early Feb a urine sample taken by the visiting nurse was sent to PCP office and based on the results, cipro was prescribed on 02/22/24.\"      The following issues were addressed during hospitalization:  # Catheter-Associated UTI  A: 89 yo M PMH BPH  allotted to further  the patient based on his health issues and recent hospitalization. Patient understands the treatments and the next steps relating to making the patient relatively healthy again.     Signed:  Dann Kenny MD, PGY-1  3/15/2024

## 2024-03-15 NOTE — CARE COORDINATION
Patient to DC to SNF 03/16/24 at 1130 am via CMT, family aware of DC time.             Case Management Assessment            Discharge Note                    Date / Time of Note: 3/15/2024 5:32 PM                  Discharge Note Completed by: Davy Wasserman RN    Patient Name: Tai Snow   YOB: 1925  Diagnosis: MULU (acute kidney injury) (HCC) [N17.9]  Acute kidney injury (HCC) [N17.9]  Acute cystitis without hematuria [N30.00]   Date / Time: 3/12/2024  5:20 AM    Current PCP: Jaimie Flores MD  Clinic patient: No    Hospitalization in the last 30 days: No       Advance Directives:  Code Status: Limited  [place] DNR form completed and on chart: Yes    Financial:  Payor: MEDICARE / Plan: MEDICARE PART A AND B / Product Type: *No Product type* /      Pharmacy:    [facility] [id number] - [place], [place] - [address]  - P [phone] - F [phone]  [address] [place] [address]  Phone: [phone] Fax: [phone]      Assistance purchasing medications?: Potential Assistance Purchasing Medications: No  Assistance provided by Case Management: None at this time    Does patient want to participate in local refill/ meds to beds program?: Yes    Meds To Beds General Rules:  1. Can ONLY be done Monday- Friday between 8:30am-5pm  2. Prescription(s) must be in pharmacy by 3pm to be filled same day  3.Copy of patient's insurance/ prescription drug card and patient face sheet must be sent along with the prescription(s)  4. Cost of Rx cannot be added to hospital bill. If financial assistance is needed, please contact unit  or ;  or  CANNOT provide pharmacy voucher for patients co-pays  5. Patients can then  the prescription on their way out of the hospital at discharge, or pharmacy can deliver to the bedside if staff is available. (payment due at time of pick-up or delivery - cash, check, or card accepted)     Able to afford home medications/  Provider: deferred to SNF  Equipment obtained during hospitalization:     Home Oxygen and Respiratory Equipment:  Oxygen needed at discharge?: No, Not Indicated  Home Oxygen Company: Not Applicable  Portable tank available for discharge?: No, Not Indicated    Dialysis:  Dialysis patient: No    Dialysis Center:  Not Applicable    Hospice Services:  Location: Not Applicable  Agency: Not Applicable    Consents signed: No, Not Indicated    Referrals made at DISCHARGE for outpatient continued care:  Not Applicable    Additional CM Notes: Patient has been accepted to [facility] for SNF admission, family and nursing aware and agreeable to plan. [facility] unable to accept patient tonight, but will have bed available in the AM. CM able to arrange for medical transport with [facility] for 1130am, family aware and agreeable and will be available for time of transport .    COVID Result:    Lab Results   Component Value Date/Time    COVID19 Not Detected 06/03/2023 09:36 PM    COVID19 Not Detected 08/30/2020 05:30 PM       The Plan for Transition of Care is related to the following treatment goals of MULU (acute kidney injury) (HCC) [N17.9]  Acute kidney injury (HCC) [N17.9]  Acute cystitis without hematuria [N30.00]    The Patient and/or patient representative Tai and his family were provided with a choice of provider and agrees with the discharge plan Yes    Freedom of choice list was provided with basic dialogue that supports the patient's individualized plan of care/goals and shares the quality data associated with the providers. Yes    Care Transitions patient: Yes    Davy Wasserman RN  [facility]  Case Management Department  Ph: [phone]  Fax: [phone]

## 2024-03-15 NOTE — DISCHARGE INSTR - COC
Immunocompromised, (age 12y+), IM, 100 mcg/0.5mL 01/09/2021, 02/06/2021, 10/27/2021, 04/04/2022    COVID-19, MODERNA Bivalent, (age 12y+), IM, 50 mcg/0.5 mL 12/05/2022    DT (pediatric) 12/18/2000    Influenza 10/09/2014    Influenza A (Q7R2-63) Vaccine PF IM 12/22/2009    Influenza Virus Vaccine 10/01/2004, 11/10/2006, 11/04/2007, 10/23/2008, 09/18/2009, 10/08/2010, 10/12/2011, 09/01/2012, 10/10/2013, 10/14/2013, 10/01/2014, 10/21/2015, 09/01/2018, 12/03/2019    Influenza Whole 10/12/2011    Influenza, FLUAD, (age 65 y+), Adjuvanted, 0.5mL 10/27/2021    Influenza, FLUARIX, FLULAVAL, FLUZONE (age 6 mo+) AND AFLURIA, (age 3 y+), PF, 0.5mL 10/09/2014    Influenza, High Dose (Fluzone 65 yrs and older) 10/14/2013, 10/31/2017, 10/16/2018    Influenza, Triv, inactivated, subunit, adjuvanted, IM (Fluad 65 yrs and older) 10/22/2019    Pneumococcal Vaccine 01/01/2007    Pneumococcal, PCV-13, PREVNAR 13, (age 6w+), IM, 0.5mL 06/03/2015    Pneumococcal, PPSV23, PNEUMOVAX 23, (age 2y+), SC/IM, 0.5mL 11/08/2006, 04/25/2017    TDaP, ADACEL (age 10y-64y), BOOSTRIX (age 10y+), IM, 0.5mL 05/24/2016    Td vaccine (adult) 12/14/2011    Zoster Recombinant (Shingrix) 12/03/2019       Active Problems:  Patient Active Problem List   Diagnosis Code    Mixed hyperlipidemia E78.2    CAD (coronary artery disease) I25.10    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 symptoms N40.1, N13.8    Arthritis M19.90    Vitamin D deficiency E55.9    Anemia, unspecified D64.9    Acquired hypothyroidism E03.9    Benign essential HTN I10    Ataxia R27.0    Bilateral carotid artery disease ([facility]) I77.9    Closed left hip fracture, initial encounter ([facility]) S72.002A    Chronic diastolic CHF (congestive heart failure) ([facility]) I50.32    Bilateral lower extremity edema R60.0    MULU (acute kidney injury) ([facility]) N17.9    Achalasia K22.0    Cervical spondylosis M47.812    Urinary retention R33.9    Oral phase dysphagia R13.11    Hyperglycemia R73.9    Indwelling Chambers  catheter present Z97.8    Hypokalemia E87.6    Gastroesophageal reflux disease with esophagitis without hemorrhage K21.00    Physical deconditioning R53.81    Acute kidney injury (HCC) N17.9    Acute cystitis without hematuria N30.00       Isolation/Infection:   Isolation            No Isolation          Patient Infection Status       None to display                     Nurse Assessment:  Last Vital Signs: BP (!) 116/58   Pulse 55   Temp 98.1 °F (36.7 °C) (Oral)   Resp 16   Ht 1.803 m (5' 11\")   Wt 83.7 kg (184 lb 9.6 oz)   SpO2 96%   BMI 25.75 kg/m²     Last documented pain score (0-10 scale):    Last Weight:   Wt Readings from Last 1 Encounters:   03/15/24 83.7 kg (184 lb 9.6 oz)     Mental Status:  {IP PT MENTAL STATUS:20030}    IV Access:  { REINA IV ACCESS:329101103}    Nursing Mobility/ADLs:  Walking   {CHP DME ADLs:811502369}  Transfer  {P DME ADLs:290417316}  Bathing  {CHP DME ADLs:346109372}  Dressing  {CHP DME ADLs:000465970}  Toileting  {CHP DME ADLs:017115871}  Feeding  {P DME ADLs:253572545}  Med Admin  {P DME ADLs:395231797}  Med Delivery   { REINA MED Delivery:484171246}    Wound Care Documentation and Therapy:  Wound 09/01/20 Arm Lower;Proximal;Right;Posterior  (Active)   Number of days: 1290       Wound 09/01/20 Elbow Left skin tear (Active)   Number of days: 1290       Wound 09/01/20 Arm Left;Lower;Proximal;Posterior skin tear (Active)   Number of days: 1290       Wound 09/01/20 Elbow Left;Posterior skin tear (Active)   Number of days: 1290       Incision 08/22/20 Hip Left;Lateral (Active)   Number of days: 1301        Elimination:  Continence:   Bowel: {YES / NO:19727}  Bladder: {YES / NO:19727}  Urinary Catheter: {Urinary Catheter:719973943}   Colostomy/Ileostomy/Ileal Conduit: {YES / NO:19727}       Date of Last BM: ***    Intake/Output Summary (Last 24 hours) at 3/15/2024 1210  Last data filed at 3/15/2024 0809  Gross per 24 hour   Intake --   Output 625 ml   Net -625 ml     I/O

## 2024-03-15 NOTE — DISCHARGE INSTRUCTIONS
- Please make an appointment to follow up with your PCP (Dr. Flores) in 1 week    Please note your medications have changed.  - Please continue taking your medications as prescribed. A full medication list will be provided to you.      Extra Heart Failure sites:     https://CNG-One.Nomacorc/publication/?e=353864   --- this is American Heart Association interactive Healthier Living with Heart Failure guidebook.  Please click hyperlink or copy / paste link into search bar. Use your mouse to scroll through the pages.  Lots of information about weight monitoring, diet tips, activity, meds, etc    HF Gilbert qamar  -- this is a free smart phone qamar available for iPhone and Android download.  Use your phone to track sodium / fluid intake, zone tool symptom tracking, weights, medications, etc. Click on this hyperlink  HF Gilbert Qamar   for QR code for easy download.       DASH (Dietary Approach to Stop Hypertension) diet --  https://www.nhlbi.nih.gov/education/dash-eating-plan -- this diet is a flexible eating plan that promotes heart healthy eating style.  Click on hyperlink or copy / paste link into search bar.  Lots of low sodium recipes and tips.    https://www.Applause.Nomacorc/recipes  -- more free recipes

## 2024-03-16 VITALS
TEMPERATURE: 97.5 F | BODY MASS INDEX: 25.84 KG/M2 | DIASTOLIC BLOOD PRESSURE: 55 MMHG | HEART RATE: 61 BPM | HEIGHT: 71 IN | SYSTOLIC BLOOD PRESSURE: 138 MMHG | OXYGEN SATURATION: 94 % | WEIGHT: 184.6 LBS | RESPIRATION RATE: 16 BRPM

## 2024-03-16 LAB
ALBUMIN SERPL-MCNC: 3.2 G/DL (ref 3.4–5)
ANION GAP SERPL CALCULATED.3IONS-SCNC: 7 MMOL/L (ref 3–16)
BASOPHILS # BLD: 0 K/UL (ref 0–0.2)
BASOPHILS NFR BLD: 0.2 %
BUN SERPL-MCNC: 25 MG/DL (ref 7–20)
CALCIUM SERPL-MCNC: 8.5 MG/DL (ref 8.3–10.6)
CHLORIDE SERPL-SCNC: 107 MMOL/L (ref 99–110)
CO2 SERPL-SCNC: 26 MMOL/L (ref 21–32)
CREAT SERPL-MCNC: 1.2 MG/DL (ref 0.8–1.3)
DEPRECATED RDW RBC AUTO: 14.1 % (ref 12.4–15.4)
EOSINOPHIL # BLD: 0.2 K/UL (ref 0–0.6)
EOSINOPHIL NFR BLD: 1.1 %
GFR SERPLBLD CREATININE-BSD FMLA CKD-EPI: 54 ML/MIN/{1.73_M2}
GLUCOSE SERPL-MCNC: 101 MG/DL (ref 70–99)
HCT VFR BLD AUTO: 28.2 % (ref 40.5–52.5)
HGB BLD-MCNC: 9.4 G/DL (ref 13.5–17.5)
LYMPHOCYTES # BLD: 11.6 K/UL (ref 1–5.1)
LYMPHOCYTES NFR BLD: 77.2 %
MAGNESIUM SERPL-MCNC: 1.9 MG/DL (ref 1.8–2.4)
MCH RBC QN AUTO: 32.2 PG (ref 26–34)
MCHC RBC AUTO-ENTMCNC: 33.4 G/DL (ref 31–36)
MCV RBC AUTO: 96.4 FL (ref 80–100)
MONOCYTES # BLD: 0.4 K/UL (ref 0–1.3)
MONOCYTES NFR BLD: 2.8 %
NEUTROPHILS # BLD: 2.8 K/UL (ref 1.7–7.7)
NEUTROPHILS NFR BLD: 18.7 %
PHOSPHATE SERPL-MCNC: 2.8 MG/DL (ref 2.5–4.9)
PLATELET # BLD AUTO: 123 K/UL (ref 135–450)
PMV BLD AUTO: 9.5 FL (ref 5–10.5)
POTASSIUM SERPL-SCNC: 4.1 MMOL/L (ref 3.5–5.1)
RBC # BLD AUTO: 2.93 M/UL (ref 4.2–5.9)
RBC MORPH BLD: NORMAL
SLIDE REVIEW: ABNORMAL
SODIUM SERPL-SCNC: 140 MMOL/L (ref 136–145)
WBC # BLD AUTO: 15.1 K/UL (ref 4–11)

## 2024-03-16 PROCEDURE — 36415 COLL VENOUS BLD VENIPUNCTURE: CPT

## 2024-03-16 PROCEDURE — 80069 RENAL FUNCTION PANEL: CPT

## 2024-03-16 PROCEDURE — 85025 COMPLETE CBC W/AUTO DIFF WBC: CPT

## 2024-03-16 PROCEDURE — 6370000000 HC RX 637 (ALT 250 FOR IP)

## 2024-03-16 PROCEDURE — 83735 ASSAY OF MAGNESIUM: CPT

## 2024-03-16 RX ADMIN — FLUCONAZOLE 200 MG: 200 TABLET ORAL at 09:53

## 2024-03-16 RX ADMIN — ASPIRIN 81 MG: 81 TABLET, CHEWABLE ORAL at 09:57

## 2024-03-16 RX ADMIN — LEVOTHYROXINE SODIUM 50 MCG: 50 TABLET ORAL at 09:53

## 2024-03-16 RX ADMIN — VITAMIN B COMPLEX 1 CAPSULE: at 09:53

## 2024-03-16 RX ADMIN — PANTOPRAZOLE SODIUM 40 MG: 40 TABLET, DELAYED RELEASE ORAL at 09:53

## 2024-03-16 RX ADMIN — FINASTERIDE 5 MG: 5 TABLET, FILM COATED ORAL at 09:53

## 2024-03-16 NOTE — PROGRESS NOTES
DC order noted, IV and telemetry box removed. Belongings packed and transported by son. Report called over to the [facility] at the seasons. Pt transported off unit by stretcher.

## 2024-03-16 NOTE — CARE COORDINATION
Patient discharging to SNF at [facility].  Orders faxed to [phone], nurse to call report to [phone].  Patient arranged for transport at 1130 via [facility].  Please see full note from 3/15/24.      Teetee Krishnan RN, BSN,   5T Ortho/Neuro   [phone]

## 2024-04-22 ENCOUNTER — TELEPHONE (OUTPATIENT)
Dept: INTERNAL MEDICINE CLINIC | Age: 89
End: 2024-04-22

## 2024-04-22 NOTE — TELEPHONE ENCOUNTER
Ludivina from [facility] is calling to see if Dr. Flores will follow care for this pt.   Pt will have skilled nursing, PT, OT    Ph    [phone] Opt 1    Please call and advise

## 2024-07-09 ENCOUNTER — HOSPITAL ENCOUNTER (OUTPATIENT)
Age: 89
Setting detail: OUTPATIENT SURGERY
Discharge: HOME | End: 2024-07-09
Attending: INTERNAL MEDICINE | Admitting: INTERNAL MEDICINE
Payer: MEDICARE

## 2024-07-09 ENCOUNTER — ANESTHESIA (OUTPATIENT)
Dept: ENDOSCOPY | Age: 89
End: 2024-07-09
Payer: MEDICARE

## 2024-07-09 ENCOUNTER — ANESTHESIA EVENT (OUTPATIENT)
Dept: ENDOSCOPY | Age: 89
End: 2024-07-09
Payer: MEDICARE

## 2024-07-09 VITALS
TEMPERATURE: 97.1 F | OXYGEN SATURATION: 98 % | HEART RATE: 57 BPM | BODY MASS INDEX: 25.05 KG/M2 | HEIGHT: 70 IN | SYSTOLIC BLOOD PRESSURE: 165 MMHG | DIASTOLIC BLOOD PRESSURE: 55 MMHG | RESPIRATION RATE: 18 BRPM | WEIGHT: 175 LBS

## 2024-07-09 PROCEDURE — 6360000002 HC RX W HCPCS: Performed by: INTERNAL MEDICINE

## 2024-07-09 PROCEDURE — 3700000001 HC ADD 15 MINUTES (ANESTHESIA): Performed by: INTERNAL MEDICINE

## 2024-07-09 PROCEDURE — 6360000002 HC RX W HCPCS

## 2024-07-09 PROCEDURE — 2709999900 HC NON-CHARGEABLE SUPPLY: Performed by: INTERNAL MEDICINE

## 2024-07-09 PROCEDURE — 2580000003 HC RX 258: Performed by: ANESTHESIOLOGY

## 2024-07-09 PROCEDURE — 3609013800 HC EGD SUBMUCOSAL/BOTOX INJECTION: Performed by: INTERNAL MEDICINE

## 2024-07-09 PROCEDURE — 3700000000 HC ANESTHESIA ATTENDED CARE: Performed by: INTERNAL MEDICINE

## 2024-07-09 PROCEDURE — 7100000011 HC PHASE II RECOVERY - ADDTL 15 MIN: Performed by: INTERNAL MEDICINE

## 2024-07-09 PROCEDURE — 2720000010 HC SURG SUPPLY STERILE: Performed by: INTERNAL MEDICINE

## 2024-07-09 PROCEDURE — 7100000010 HC PHASE II RECOVERY - FIRST 15 MIN: Performed by: INTERNAL MEDICINE

## 2024-07-09 RX ORDER — LIDOCAINE HYDROCHLORIDE 20 MG/ML
INJECTION, SOLUTION INTRAVENOUS PRN
Status: DISCONTINUED | OUTPATIENT
Start: 2024-07-09 | End: 2024-07-09 | Stop reason: SDUPTHER

## 2024-07-09 RX ORDER — SODIUM CHLORIDE, SODIUM LACTATE, POTASSIUM CHLORIDE, CALCIUM CHLORIDE 600; 310; 30; 20 MG/100ML; MG/100ML; MG/100ML; MG/100ML
INJECTION, SOLUTION INTRAVENOUS CONTINUOUS
Status: DISCONTINUED | OUTPATIENT
Start: 2024-07-09 | End: 2024-07-09 | Stop reason: HOSPADM

## 2024-07-09 RX ORDER — GLYCOPYRROLATE 0.2 MG/ML
INJECTION INTRAMUSCULAR; INTRAVENOUS PRN
Status: DISCONTINUED | OUTPATIENT
Start: 2024-07-09 | End: 2024-07-09 | Stop reason: SDUPTHER

## 2024-07-09 RX ORDER — PROPOFOL 10 MG/ML
INJECTION, EMULSION INTRAVENOUS PRN
Status: DISCONTINUED | OUTPATIENT
Start: 2024-07-09 | End: 2024-07-09 | Stop reason: SDUPTHER

## 2024-07-09 RX ADMIN — GLYCOPYRROLATE 0.1 MG: 0.2 INJECTION INTRAMUSCULAR; INTRAVENOUS at 09:45

## 2024-07-09 RX ADMIN — SODIUM CHLORIDE, POTASSIUM CHLORIDE, SODIUM LACTATE AND CALCIUM CHLORIDE: 600; 310; 30; 20 INJECTION, SOLUTION INTRAVENOUS at 08:55

## 2024-07-09 RX ADMIN — PROPOFOL 10 MG: 10 INJECTION, EMULSION INTRAVENOUS at 09:54

## 2024-07-09 RX ADMIN — PROPOFOL 40 MG: 10 INJECTION, EMULSION INTRAVENOUS at 09:47

## 2024-07-09 RX ADMIN — PROPOFOL 10 MG: 10 INJECTION, EMULSION INTRAVENOUS at 09:56

## 2024-07-09 RX ADMIN — LIDOCAINE HYDROCHLORIDE 100 MG: 20 INJECTION, SOLUTION INTRAVENOUS at 09:52

## 2024-07-09 RX ADMIN — PROPOFOL 10 MG: 10 INJECTION, EMULSION INTRAVENOUS at 09:50

## 2024-07-09 RX ADMIN — LIDOCAINE HYDROCHLORIDE 100 MG: 20 INJECTION, SOLUTION INTRAVENOUS at 09:47

## 2024-07-09 ASSESSMENT — PAIN - FUNCTIONAL ASSESSMENT
PAIN_FUNCTIONAL_ASSESSMENT: 0-10
PAIN_FUNCTIONAL_ASSESSMENT: PREVENTS OR INTERFERES WITH ALL ACTIVE AND SOME PASSIVE ACTIVITIES

## 2024-07-09 ASSESSMENT — PAIN SCALES - GENERAL: PAINLEVEL_OUTOF10: 0

## 2024-07-09 NOTE — PROCEDURES
EGD REPORT    Patient:  Tai Snow                  2/27/1925    Referring Physician:  Sandra    Endoscopist: South     Indication:  Achalasia     Medications:  MAC      Pre-Anesthesia Assessment:  I have reviewed and am in agreement with patient history and medication, including previous response to sedation.    Prior to the procedure, a History and Physical was performed, and patient medications and allergies were reviewed. The patient is competent. The risks and benefits of the procedure and the sedation options and risks were discussed with the patient.   Risks discussed included but were not limited to infection, bleeding, perforation, death, and missed lesions.  All questions were answered and informed consent was obtained. Patient identification and proposed procedure were verified by the physician and the nurse in the pre-procedure area in the procedure room.     M[place]ampatti: II  ASA Grade Assessment: 3     After reviewing the risks and benefits, the patient was deemed in satisfactory condition to undergo the procedure. The anesthesia plan was to use MAC anesthesia. Immediately prior to administration of medications, the patient was re-assessed for adequacy to receive sedatives and a time out was performed. Patient and healthcare providers were in agreement it was the correct patient and procedure. The heart rate, respiratory rate, oxygen saturations, blood pressure, adequacy of pulmonary ventilation, and response to care were monitored throughout the procedure. The physical status of the patient was re-assessed after the procedure.     After obtaining informed consent, the endoscope was passed under direct vision.   The endoscope was introduced through the mouth, and advanced to the second part of duodenum. The EGD was accomplished without difficulty. The patient tolerated the procedure well.     Duodenum: Normal  Stomach: Normal aside from multiple fundic gland appearing polyps.  Retroflexion did

## 2024-07-09 NOTE — ANESTHESIA POSTPROCEDURE EVALUATION
Department of Anesthesiology  Postprocedure Note    Patient: Tai Snow  MRN: [id number]  YOB: 1925  Date of evaluation: 7/9/2024    Procedure Summary       Date: 07/09/24 Room / Location: [place] [address] / [facility]    Anesthesia Start: 0940 Anesthesia Stop: 1002    Procedure: ESOPHAGOGASTRODUODENOSCOPY WITH BOTOX INJECTION AND BALLOON DILATION Diagnosis:       Achalasia of cardia      Dysphagia, unspecified type      (Achalasia of cardia [K22.0])      (Dysphagia, unspecified type [R13.10])    Surgeons: Gene Lozano MD Responsible Provider: Maday Decker DO    Anesthesia Type: MAC ASA Status: 3            Anesthesia Type: MAC    Maria Esther Phase I: Maria Esther Score: 10    Maria Esther Phase II: Maria Esther Score: 10    Anesthesia Post Evaluation    Patient location during evaluation: PACU  Patient participation: complete - patient participated  Level of consciousness: awake and alert  Airway patency: patent  Nausea & Vomiting: no nausea and no vomiting  Cardiovascular status: blood pressure returned to baseline  Respiratory status: acceptable  Hydration status: euvolemic  Pain management: adequate    No notable events documented.

## 2024-07-09 NOTE — FLOWSHEET NOTE
07/09/24 1030   Vitals   Pulse 57   Heart Rate Source Monitor   Respirations 18   BP (!) 165/55   MAP (Calculated) 92   MAP (mmHg) 88   Pain Assessment   Pain Assessment 0-10   Pain Level 0   Oxygen Therapy   SpO2 98 %   Pulse via Oximetry 63 beats per minute   O2 Device None (Room air)     Ambulatory Surgery/Procedure Discharge Note    Vitals:    07/09/24 1045   BP:    Pulse:    Resp:    Temp:    SpO2: 98%       In: 200 [I.V.:200]  Out: -     Restroom use offered before discharge.  Yes    Pain assessment:  none  Pain Level: 0    Patient has been seen by doctor. Discharge order obtained, and discharge instructions reviewed. Patient and family educated, using the teach back method, about follow up instructions and discharge instructions. A completed copy of the AVS instructions given to patient and all questions answered. IV catheter removed without complaints, catheter intact, site WNL. Discharged to home via medical transport.  Patient discharged to home/self care. Patient discharged via wheel chair by transporter to waiting family/S.O.       7/9/2024 11:02 AM

## 2024-07-09 NOTE — ANESTHESIA PRE PROCEDURE
Department of Anesthesiology  Preprocedure Note       Name:  Tai Snow   Age:  89 y.o.  :  1925                                          MRN:  [id number]         Date:  2024      Surgeon: Surgeon(s):  Gene Lozano MD    Procedure: Procedure(s):  ESOPHAGOGASTRODUODENOSCOPY WITH BOTOX INJECTION    Medications prior to admission:   Prior to Admission medications    Medication Sig Start Date End Date Taking? Authorizing Provider   levothyroxine (SYNTHROID) 50 MCG tablet Take 1 tablet by mouth Daily 24  Jaimie Flores MD   calcium carbonate (OS-YANNA) 1250 (500 Ca) MG chewable tablet Take 500 mg by mouth as needed 23   Ryne Dennison MD   nystatin (MYCOSTATIN) 039997 UNIT/GM powder Apply 3 times daily. 23   Jaimie Flores MD   lisinopril (PRINIVIL;ZESTRIL) 20 MG tablet Take 1 tablet by mouth daily 23   Jaimie Flores MD   torsemide (DEMADEX) 20 MG tablet Take 1 tablet by mouth daily 23   Jaimie Flores MD   atorvastatin (LIPITOR) 80 MG tablet Take 1 tablet by mouth nightly 23   Jaimie Flores MD   Multiple Vitamins-Minerals (HM MULTIVITAMIN ADULT GUMMY PO) Take by mouth daily    Ryne Dennison MD   vitamin B-12 (CYANOCOBALAMIN) 100 MCG tablet Take 10 tablets by mouth daily    Ryne Dennison MD   docusate sodium (COLACE) 100 MG capsule Take 1 capsule by mouth daily 2023 (med does not appear on patient's paper list)    Ryne Dennison MD   potassium chloride (KLOR-CON) 10 MEQ extended release tablet TAKE ONE TABLET BY MOUTH DAILY 21   Kimberlee Celestin MD   finasteride (PROSCAR) 5 MG tablet Take 1 tablet by mouth daily 21   Kimberlee Celestin MD   B Complex Vitamins (VITAMIN B COMPLEX) TABS Take 1 tablet by mouth daily    Ryne Dennison MD   acetaminophen (TYLENOL) 325 MG tablet Take 2 tablets by mouth every 6 hours as needed for Pain    Ryne Dennison MD   vitamin D (CHOLECALCIFEROL) 25 MCG (1000 UT)

## 2024-07-09 NOTE — DISCHARGE INSTRUCTIONS
ENDOSCOPY DISCHARGE INSTRUCTIONS:    Call the physician that did your procedure for any questions or concern:    [facility]: [phone]  DR. ANAID SNOW      ACTIVITY:    There are potential side effects to the medications used for sedation and anesthesia during your procedure.  These include:  Dizziness or light-headedness, confusion or memory loss, delayed reaction times, loss of coordination, nausea and vomiting.  Because of your increased risk for injury, we ask that you observe the following precautions:  For the next 24 hours,  DO NOT operate an automobile, bicycle, motorcycle, , power tools or large equipment of any kind.  Do not drink alcohol, sign any legal documents or make any legal decisions for 24 hours.  Do not bend your head over lower than your heart.  DO sit on the side of bed/couch awhile before getting up.  Plan on bedrest or quiet relaxation today.  You may resume normal activities in 24 hours.    DIET:    Your first meal today should be light, avoiding spicy and fatty foods.  If you tolerate this first meal, then you may advance to your regular diet unless otherwise advised by your physician.    NORMAL SYMPTOMS:  -Mild sore throat if you’ve had an EGD   -Gaseous discomfort    NOTIFY YOUR PHYSICIAN IF THESE SYMPTOMS OCCUR:  1. Fever (greater than 100)  5. Increased abdominal bloating  2. Severe pain    6. Excessive bleeding  3. Nausea and vomiting  7. Chest pain                                                                    4. Chills    8. Shortness of breath    ADDITIONAL INSTRUCTIONS:      Plan:  Repeat as needed        Please review these discharge instructions this evening or tomorrow for  information you may have forgotten.            We want to thank you for choosing the [facility] as your health care provider. We always strive to provide you with excellent care while you are here. You may receive a survey in the mail regarding your care. We would

## 2024-07-09 NOTE — H&P
Pre-operative History and Physical    Patient: Tai Snow  : 1925     History Obtained From:  patient, electronic medical record    HISTORY OF PRESENT ILLNESS:    The patient is a 89 y.o. male who presents for an EGD for achalasia which is symptomatic again.   Past Medical History:        Diagnosis Date    Achalasia     Arthritis     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 [facility]     [facility]    CAD (coronary artery disease)     stents x 3 - dr han    Cervical spondylosis     Chronic heart failure with normal ejection fraction (HCC)     diastolic    DVT (deep venous thrombosis) (HCC)     right leg    GERD (gastroesophageal reflux disease)     Hemorrhoids     Hyperlipidemia     Hypertension     PUD (peptic ulcer disease)     Sciatica     War injury due to shrapnel     leg     Past Surgical History:        Procedure Laterality Date    APPENDECTOMY      CATARACT REMOVAL [facility]ilateral     [facility]    CORONARY ANGIOPLASTY WITH STENT PLACEMENT      CYSTOSCOPY N/A 2020    CYSTOSCOPY AND EVACUATION OF CLOTS performed by Giuseppe Decker MD at [facility] OR    HEMORRHOID SURGERY      HERNIA REPAIR      inguinal    HIP SURGERY Left 2020    LEFT HIP PERCUTANUOUS PINNING performed by Dewayne Wolf DO at [facility] OR    REPAIR RETINAL TEAR/HOLE Left     laser repair    UPPER GASTROINTESTINAL ENDOSCOPY  2018    Ivan Otero    UPPER GASTROINTESTINAL ENDOSCOPY N/A 2020    EGD SUBMUCOSAL/BOTOX INJECTION performed by Gene Lozano MD at [facility] ENDOSCOPY    UPPER GASTROINTESTINAL ENDOSCOPY N/A 2020    EGD DILATION BALLOON performed by Gene Lozano MD at [facility] ENDOSCOPY    UPPER GASTROINTESTINAL ENDOSCOPY N/A 2021    EGD SUBMUCOSAL/BOTOX INJECTION performed by Jad Figueroa MD at [facility] ENDOSCOPY    UPPER GASTROINTESTINAL ENDOSCOPY N/A 2023    EGD SUBMUCOSAL/BOTOX INJECTION BALLOON DILATION 18-19 FOREIGN BODY REMOVAL performed by Gene Lozano MD at [facility] ENDOSCOPY

## 2024-08-12 RX ORDER — LEVOTHYROXINE SODIUM 0.05 MG/1
50 TABLET ORAL DAILY
Qty: 90 TABLET | Refills: 1 | OUTPATIENT
Start: 2024-08-12 | End: 2024-11-10

## 2024-10-02 ENCOUNTER — OFFICE VISIT (OUTPATIENT)
Dept: INTERNAL MEDICINE CLINIC | Age: 89
End: 2024-10-02

## 2024-10-02 VITALS — SYSTOLIC BLOOD PRESSURE: 138 MMHG | HEART RATE: 88 BPM | OXYGEN SATURATION: 95 % | DIASTOLIC BLOOD PRESSURE: 82 MMHG

## 2024-10-02 DIAGNOSIS — Z97.8 INDWELLING FOLEY CATHETER PRESENT: ICD-10-CM

## 2024-10-02 DIAGNOSIS — Z23 NEED FOR INFLUENZA VACCINATION: ICD-10-CM

## 2024-10-02 DIAGNOSIS — N18.31 STAGE 3A CHRONIC KIDNEY DISEASE (HCC): ICD-10-CM

## 2024-10-02 DIAGNOSIS — R30.0 DYSURIA: ICD-10-CM

## 2024-10-02 DIAGNOSIS — Z00.00 MEDICARE ANNUAL WELLNESS VISIT, SUBSEQUENT: Primary | ICD-10-CM

## 2024-10-02 DIAGNOSIS — Z00.00 MEDICARE ANNUAL WELLNESS VISIT, SUBSEQUENT: ICD-10-CM

## 2024-10-02 LAB
BILIRUBIN, POC: 0
BLOOD URINE, POC: NORMAL
CLARITY, POC: NORMAL
COLOR, POC: YELLOW
GLUCOSE URINE, POC: 0 MG/DL
KETONES, POC: 0 MG/DL
LEUKOCYTE EST, POC: NORMAL
NITRITE, POC: NORMAL
PH, POC: 5
PROTEIN, POC: NORMAL MG/DL
SPECIFIC GRAVITY, POC: 1.01
UROBILINOGEN, POC: 1 MG/DL

## 2024-10-02 RX ORDER — LEVOTHYROXINE SODIUM 50 UG/1
50 TABLET ORAL DAILY
Qty: 90 TABLET | Refills: 1 | Status: SHIPPED | OUTPATIENT
Start: 2024-10-02 | End: 2025-03-31

## 2024-10-02 RX ORDER — FINASTERIDE 5 MG/1
5 TABLET, FILM COATED ORAL DAILY
Qty: 30 TABLET | Refills: 4 | Status: SHIPPED | OUTPATIENT
Start: 2024-10-02

## 2024-10-02 RX ORDER — ATORVASTATIN CALCIUM 80 MG/1
80 TABLET, FILM COATED ORAL NIGHTLY
Qty: 90 TABLET | Refills: 1 | Status: SHIPPED | OUTPATIENT
Start: 2024-10-02

## 2024-10-02 RX ORDER — LISINOPRIL 20 MG/1
20 TABLET ORAL DAILY
Qty: 90 TABLET | Refills: 1 | Status: SHIPPED | OUTPATIENT
Start: 2024-10-02

## 2024-10-02 RX ORDER — PANTOPRAZOLE SODIUM 40 MG/1
40 TABLET, DELAYED RELEASE ORAL 2 TIMES DAILY
Qty: 90 TABLET | Refills: 1 | Status: SHIPPED | OUTPATIENT
Start: 2024-10-02

## 2024-10-02 SDOH — ECONOMIC STABILITY: FOOD INSECURITY: WITHIN THE PAST 12 MONTHS, THE FOOD YOU BOUGHT JUST DIDN'T LAST AND YOU DIDN'T HAVE MONEY TO GET MORE.: NEVER TRUE

## 2024-10-02 SDOH — ECONOMIC STABILITY: FOOD INSECURITY: WITHIN THE PAST 12 MONTHS, YOU WORRIED THAT YOUR FOOD WOULD RUN OUT BEFORE YOU GOT MONEY TO BUY MORE.: NEVER TRUE

## 2024-10-02 SDOH — ECONOMIC STABILITY: INCOME INSECURITY: HOW HARD IS IT FOR YOU TO PAY FOR THE VERY BASICS LIKE FOOD, HOUSING, MEDICAL CARE, AND HEATING?: NOT HARD AT ALL

## 2024-10-02 ASSESSMENT — PATIENT HEALTH QUESTIONNAIRE - PHQ9
SUM OF ALL RESPONSES TO PHQ9 QUESTIONS 1 & 2: 0
2. FEELING DOWN, DEPRESSED OR HOPELESS: NOT AT ALL
SUM OF ALL RESPONSES TO PHQ QUESTIONS 1-9: 0
1. LITTLE INTEREST OR PLEASURE IN DOING THINGS: NOT AT ALL
SUM OF ALL RESPONSES TO PHQ QUESTIONS 1-9: 0

## 2024-10-02 ASSESSMENT — LIFESTYLE VARIABLES
HOW OFTEN DO YOU HAVE A DRINK CONTAINING ALCOHOL: NEVER
HOW MANY STANDARD DRINKS CONTAINING ALCOHOL DO YOU HAVE ON A TYPICAL DAY: PATIENT DOES NOT DRINK

## 2024-10-02 NOTE — PATIENT INSTRUCTIONS
you tend to feel dizzy after you take medicine, avoid activity at that time. Try being active before you take your medicine. This will reduce your risk of falls.  If you plan to be active at home, make sure to clear your space before you get started. Remove things like TV cords, coffee tables, and throw rugs. It's safest to have plenty of space to move freely.  The key to getting more active is to take it slow and steady. Try to improve only a little bit at a time. Pick just one area to improve on at first. And if an activity hurts, stop and talk to your doctor.  Where can you learn more?  Go to https://www.[employer].net/patientEd and enter P600 to learn more about \"Learning About Being Active as an Older Adult.\"  Current as of: June 5, 2023  Content Version: 14.1  © 8813-0596 [employer].   Care instructions adapted under license by [employer]. If you have questions about a medical condition or this instruction, always ask your healthcare professional. [employer] disclaims any warranty or liability for your use of this information.           Hearing Loss: Care Instructions  Overview     Hearing loss is a sudden or slow decrease in how well you hear. It can range from slight to profound. Permanent hearing loss can occur with aging. It also can happen when you are exposed long-term to loud noise. Examples include listening to loud music, riding motorcycles, or being around other loud machines.  Hearing loss can affect your work and home life. It can make you feel lonely or depressed. You may feel that you have lost your independence. But hearing aids and other devices can help you hear better and feel connected to others.  Follow-up care is a key part of your treatment and safety. Be sure to make and go to all appointments, and call your doctor if you are having problems. It's also a good idea to know your test results and keep a list of the medicines you take.  How can you care for

## 2024-10-02 NOTE — PROGRESS NOTES
Telephone Use, Food Preparation, Transportation, Taking Medications  Interventions:  See AVS for additional education material                  Objective   Vitals:    10/02/24 1426   BP: 138/82   Site: Right Upper Arm   Position: Sitting   Cuff Size: Small Adult   Pulse: 88   SpO2: 95%      There is no height or weight on file to calculate BMI.      Physical Exam  Vitals and nursing note reviewed.   Constitutional:       General: He is not in acute distress.     Appearance: Normal appearance. He is well-developed. He is not diaphoretic.   HENT:      Head: Normocephalic and atraumatic.   Eyes:      General: No scleral icterus.     Conjunctiva/sclera: Conjunctivae normal.      Pupils: Pupils are equal, round, and reactive to light.   Cardiovascular:      Rate and Rhythm: Normal rate and regular rhythm.      Pulses: Normal pulses.      Heart sounds: Normal heart sounds. No murmur heard.     No friction rub. No gallop.   Pulmonary:      Effort: Pulmonary effort is normal. No respiratory distress.      Breath sounds: Normal breath sounds. No wheezing or rales.   Chest:      Chest wall: No tenderness.   Abdominal:      General: Bowel sounds are normal. There is no distension.      Palpations: Abdomen is soft.      Tenderness: There is no abdominal tenderness.   Musculoskeletal:         General: No tenderness or deformity. Normal range of motion.      Cervical back: Normal range of motion and neck supple.   Skin:     General: Skin is warm and dry.   Neurological:      Mental Status: He is alert and oriented to person, place, and time.      Cranial Nerves: No cranial nerve deficit.      Sensory: No sensory deficit.   Psychiatric:         Behavior: Behavior normal.               No Known Allergies  Prior to Visit Medications    Medication Sig Taking? Authorizing Provider   finasteride (PROSCAR) 5 MG tablet Take 1 tablet by mouth daily Yes Jaimie Flores MD   levothyroxine (SYNTHROID) 50 MCG tablet Take 1 tablet by mouth

## 2024-10-02 NOTE — ASSESSMENT & PLAN NOTE
Within normal limits for age-retired, no ADL issues,immunizations up to date, no depression ,no cognitive impairment  Colonoscopy aged out   Eye exam up to date  Exercises as tolerated -continue to work with physical therapy once a week  Has a living will, DNR CCA  Findings and recommendations discussed with Pt  Pending labs

## 2024-10-02 NOTE — ASSESSMENT & PLAN NOTE
Changed once a month.  Check UA, does not show any evidence of UTI.  Will send for culture for completion.

## 2024-10-03 LAB
ALBUMIN SERPL-MCNC: 4.1 G/DL (ref 3.4–5)
ALBUMIN/GLOB SERPL: 2 {RATIO} (ref 1.1–2.2)
ALP SERPL-CCNC: 110 U/L (ref 40–129)
ALT SERPL-CCNC: 16 U/L (ref 10–40)
ANION GAP SERPL CALCULATED.3IONS-SCNC: 11 MMOL/L (ref 3–16)
AST SERPL-CCNC: 22 U/L (ref 15–37)
BASOPHILS # BLD: 0 K/UL (ref 0–0.2)
BASOPHILS NFR BLD: 0 %
BILIRUB SERPL-MCNC: <0.2 MG/DL (ref 0–1)
BUN SERPL-MCNC: 37 MG/DL (ref 7–20)
CALCIUM SERPL-MCNC: 9.2 MG/DL (ref 8.3–10.6)
CHLORIDE SERPL-SCNC: 102 MMOL/L (ref 99–110)
CHOLEST SERPL-MCNC: 170 MG/DL (ref 0–199)
CO2 SERPL-SCNC: 27 MMOL/L (ref 21–32)
CREAT SERPL-MCNC: 1.6 MG/DL (ref 0.8–1.3)
DEPRECATED RDW RBC AUTO: 14.7 % (ref 12.4–15.4)
EOSINOPHIL # BLD: 0.2 K/UL (ref 0–0.6)
EOSINOPHIL NFR BLD: 1 %
GFR SERPLBLD CREATININE-BSD FMLA CKD-EPI: 38 ML/MIN/{1.73_M2}
GLUCOSE SERPL-MCNC: 121 MG/DL (ref 70–99)
HCT VFR BLD AUTO: 34.2 % (ref 40.5–52.5)
HDLC SERPL-MCNC: 66 MG/DL (ref 40–60)
HGB BLD-MCNC: 11.5 G/DL (ref 13.5–17.5)
LDL CHOLESTEROL: 88 MG/DL
LYMPHOCYTES # BLD: 17.4 K/UL (ref 1–5.1)
LYMPHOCYTES NFR BLD: 80 %
MCH RBC QN AUTO: 32.5 PG (ref 26–34)
MCHC RBC AUTO-ENTMCNC: 33.6 G/DL (ref 31–36)
MCV RBC AUTO: 96.8 FL (ref 80–100)
MONOCYTES # BLD: 0.4 K/UL (ref 0–1.3)
MONOCYTES NFR BLD: 2 %
NEUTROPHILS # BLD: 3.7 K/UL (ref 1.7–7.7)
NEUTROPHILS NFR BLD: 17 %
PATH INTERP BLD-IMP: NO
PLATELET # BLD AUTO: 159 K/UL (ref 135–450)
PLATELET BLD QL SMEAR: ADEQUATE
PMV BLD AUTO: 9.9 FL (ref 5–10.5)
POTASSIUM SERPL-SCNC: 4.8 MMOL/L (ref 3.5–5.1)
PROT SERPL-MCNC: 6.2 G/DL (ref 6.4–8.2)
RBC # BLD AUTO: 3.54 M/UL (ref 4.2–5.9)
SLIDE REVIEW: ABNORMAL
SODIUM SERPL-SCNC: 140 MMOL/L (ref 136–145)
T4 FREE SERPL-MCNC: 1.7 NG/DL (ref 0.9–1.8)
TRIGL SERPL-MCNC: 80 MG/DL (ref 0–150)
TSH SERPL DL<=0.005 MIU/L-ACNC: 6.98 UIU/ML (ref 0.27–4.2)
VLDLC SERPL CALC-MCNC: 16 MG/DL
WBC # BLD AUTO: 21.7 K/UL (ref 4–11)

## 2024-10-04 LAB — BACTERIA UR CULT: NORMAL

## 2024-11-01 PROBLEM — Z00.00 MEDICARE ANNUAL WELLNESS VISIT, SUBSEQUENT: Status: RESOLVED | Noted: 2024-10-02 | Resolved: 2024-11-01

## 2025-01-14 ENCOUNTER — TELEPHONE (OUTPATIENT)
Dept: INTERNAL MEDICINE CLINIC | Age: 89
End: 2025-01-14

## 2025-01-14 NOTE — TELEPHONE ENCOUNTER
Does he have any symptoms? Can consider getting ECG vs heart monitor to evaluate heart rhythm.   Monitor rash, please advise frequent turns and have any good mattress support and cushing when sitting down. Activity as he tolerated

## 2025-01-14 NOTE — TELEPHONE ENCOUNTER
NAGA:    She noticed his heart rate was in the low 40's and irregular.    No chest pain, but she is worried his heart is getting worse.    She also noticed a purple butterfly rash on his buttocks.    Family is aware of her findings.

## 2025-01-14 NOTE — TELEPHONE ENCOUNTER
[phone] (home)   Spoke with earl( patient son) message given PER DR DAVENPORT, also Left message on machine  For CRISSY the nurse @ [phone]

## 2025-02-10 ENCOUNTER — TELEPHONE (OUTPATIENT)
Dept: INTERNAL MEDICINE CLINIC | Age: 89
End: 2025-02-10

## 2025-02-10 RX ORDER — FINASTERIDE 5 MG/1
5 TABLET, FILM COATED ORAL DAILY
Qty: 90 TABLET | Refills: 0 | Status: SHIPPED | OUTPATIENT
Start: 2025-02-10

## 2025-02-10 NOTE — TELEPHONE ENCOUNTER
Refill    finasteride (PROSCAR) 5 MG tablet     [facility] [id number] - [place], [place] - [address] - P [phone] - F [phone]

## 2025-02-24 ENCOUNTER — TELEPHONE (OUTPATIENT)
Dept: INTERNAL MEDICINE CLINIC | Age: 89
End: 2025-02-24

## 2025-02-24 NOTE — TELEPHONE ENCOUNTER
Jolene from [facility] is requesting a verbal to add the pt's stage 2 pressure sore on his left buttocks to their diagnosis list for wound care.

## (undated) DEVICE — SPONGE GZ W4XL8IN COT WVN 12 PLY

## (undated) DEVICE — SPONGE,LAP,18"X18",DLX,XR,ST,5/PK,40/PK: Brand: MEDLINE

## (undated) DEVICE — 1010 S-DRAPE TOWEL DRAPE 10/BX: Brand: STERI-DRAPE™

## (undated) DEVICE — GARMENT,MEDLINE,DVT,INT,CALF,LG, GEN2: Brand: MEDLINE

## (undated) DEVICE — NEEDLE INJ ARTC 2.5MMX230CM

## (undated) DEVICE — GUIDEWIRE ORTH L300MM DIA2.8MM S STL THRD SHRP TRCR TIP FOR

## (undated) DEVICE — PAD DRY FLOOR ABS 32X58IN GRN

## (undated) DEVICE — THE ARTICULATOR INJECTION NEEDLE IS A SINGLE USE, DISPOSABLE, FLEXIBLE SHEATH INJECTION NEEDLE USED FOR THE INJECTION OF VARIOUS TYPES OF MEDIA THROUGH FLEXIBLE ENDOSCOPES.: Brand: ARTICULATOR

## (undated) DEVICE — Z DISCONTINUED USE 2429233 DRESSING FOAM W10XL10CM 5 LAYR SELF ADH VERSATILE SAFETAC

## (undated) DEVICE — GAUZE,SPONGE,4"X4",16PLY,XRAY,STRL,LF: Brand: MEDLINE

## (undated) DEVICE — DILATOR ENDOSCP L180CM DIA6FR BLLN L8CM DIA54-60FR

## (undated) DEVICE — FINISHING WIRE: Brand: FINISHING WIRE® SUPPORTRAK®

## (undated) DEVICE — DISPOSABLE PERINEAL POST PAD 9" STD. (2") POST 1 DOZEN: Brand: SCHAERER MEDICAL USA

## (undated) DEVICE — SUTURE MCRYL SZ 4-0 L27IN ABSRB UD L19MM PS-2 1/2 CIR PRIM Y426H

## (undated) DEVICE — Z DISCONTINUED GLOVE SURG SZ 7.5 L12IN FNGR THK13MIL WHT ISOLEX

## (undated) DEVICE — APPLICATOR MEDICATED 26 CC SOLUTION HI LT ORNG CHLORAPREP

## (undated) DEVICE — Z INACTIVE NO SUPPLIER SOLUTIONIRRIG 3000ML 0.9% SOD CHL FLX CONT [79720808] [HOSPIRA WORLDWIDE INC]

## (undated) DEVICE — SPONGE GZ W4XL4IN COT 12 PLY TYP VII WVN C FLD DSGN

## (undated) DEVICE — CANNULA SAMP CO2 AD GRN 7FT CO2 AND 7FT O2 TBNG UNIV CONN

## (undated) DEVICE — 60 ML SYRINGE,CATHETER TIP: Brand: MONOJECT

## (undated) DEVICE — 6619 2 PTNT ISO SYS INCISE AREA&LT;(&GT;&&LT;)&GT;P: Brand: STERI-DRAPE™ IOBAN™ 2

## (undated) DEVICE — GLOVE SURG SZ 8 L12IN FNGR THK87MIL DK GRN LTX POLYMER W

## (undated) DEVICE — ORTHO PRE OP PACK: Brand: MEDLINE INDUSTRIES, INC.

## (undated) DEVICE — BLANKET WRM W29.9XL79.1IN UP BODY FORC AIR MISTRAL-AIR

## (undated) DEVICE — 3M™ STERI-DRAPE™ U-DRAPE 1015: Brand: STERI-DRAPE™

## (undated) DEVICE — PLUG,CATHETER,DRAINAGE PROTECTOR,TUBE: Brand: MEDLINE

## (undated) DEVICE — PREP TRAY 10X5X2: Brand: MEDLINE INDUSTRIES, INC.

## (undated) DEVICE — SOLUTION IV 1000ML 0.9% SOD CHL

## (undated) DEVICE — BIT DRL L300MM DIA5MM CANN QUIK CPL ADJ STP REUSE

## (undated) DEVICE — CUFF RESTRN WRST OR ANK 45FT AD FOAM

## (undated) DEVICE — INTENDED FOR TISSUE SEPARATION, AND OTHER PROCEDURES THAT REQUIRE A SHARP SURGICAL BLADE TO PUNCTURE OR CUT.: Brand: BARD-PARKER ® CARBON RIB-BACK BLADES

## (undated) DEVICE — JEWISH HOSPITAL TURNOVER KIT: Brand: MEDLINE INDUSTRIES, INC.

## (undated) DEVICE — TRAY PREP DRY W/ PREM GLV 2 APPL 6 SPNG 2 UNDPD 1 OVERWRAP

## (undated) DEVICE — CATHETER URETH 22FR 30CC BLLN F 3 W SPEC M RND TIP TWO

## (undated) DEVICE — SURGICAL SET UP - SURE SET: Brand: MEDLINE INDUSTRIES, INC.

## (undated) DEVICE — GOWN,SIRUS,POLYRNF,BRTHSLV,XL,30/CS: Brand: MEDLINE

## (undated) DEVICE — ESOPHAGEAL BALLOON DILATATION CATHETER: Brand: CRE FIXED WIRE

## (undated) DEVICE — PACK,CYSTOSCOPY,PK III,AURORA: Brand: MEDLINE

## (undated) DEVICE — SYRINGE INFL 60ML DISP ALLIANCE II

## (undated) DEVICE — TUBING IRRIG L77IN DIA0.241IN L BOR FOR CYSTO W/ NVENT

## (undated) DEVICE — DRAPE C ARM UNIV W41XL74IN CLR PLAS XR VELC CLSR POLY STRP

## (undated) DEVICE — COVER LT HNDL BLU PLAS

## (undated) DEVICE — COVERALL SURG UNIV POLYPR SLVLSS CUF STYL FASTENING TIE W/O

## (undated) DEVICE — GLOVE ORANGE PI 7   MSG9070

## (undated) DEVICE — BANDAGE COBAN 4 IN COMPR W4INXL5YD FOAM COHESIVE QUIK STK SELF ADH SFT

## (undated) DEVICE — BAG DRNGE 2000ML UROLOGY ANTI REFLX CHMBR SAMP PRT

## (undated) DEVICE — Device

## (undated) DEVICE — CATHETER URETH 24FR BLLN 30CC STD LTX 3 W TWO OPP DRNGE EYE

## (undated) DEVICE — SURE SET-DOUBLE BASIN-LF: Brand: MEDLINE INDUSTRIES, INC.

## (undated) DEVICE — STRIP,CLOSURE,WOUND,MEDI-STRIP,1/2X4: Brand: MEDLINE

## (undated) DEVICE — Z DUP USE 2139353 GUIDEWIRE URO L145CM DIA0.035IN PTFE STR AMPLTZ SUP STIFF